# Patient Record
Sex: FEMALE | Race: WHITE | NOT HISPANIC OR LATINO | Employment: OTHER | URBAN - METROPOLITAN AREA
[De-identification: names, ages, dates, MRNs, and addresses within clinical notes are randomized per-mention and may not be internally consistent; named-entity substitution may affect disease eponyms.]

---

## 2023-04-14 ENCOUNTER — HOSPITAL ENCOUNTER (INPATIENT)
Facility: MEDICAL CENTER | Age: 73
LOS: 3 days | DRG: 956 | End: 2023-04-17
Attending: EMERGENCY MEDICINE | Admitting: STUDENT IN AN ORGANIZED HEALTH CARE EDUCATION/TRAINING PROGRAM
Payer: MEDICARE

## 2023-04-14 ENCOUNTER — APPOINTMENT (OUTPATIENT)
Dept: RADIOLOGY | Facility: MEDICAL CENTER | Age: 73
DRG: 956 | End: 2023-04-14
Attending: EMERGENCY MEDICINE
Payer: MEDICARE

## 2023-04-14 DIAGNOSIS — E86.0 DEHYDRATION: ICD-10-CM

## 2023-04-14 DIAGNOSIS — S72.002A CLOSED FRACTURE OF LEFT HIP, INITIAL ENCOUNTER (HCC): Primary | ICD-10-CM

## 2023-04-14 DIAGNOSIS — S72.002A HIP FRACTURE REQUIRING OPERATIVE REPAIR, LEFT, CLOSED, INITIAL ENCOUNTER (HCC): ICD-10-CM

## 2023-04-14 DIAGNOSIS — R00.0 SINUS TACHYCARDIA: ICD-10-CM
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
## 2023-04-14 PROBLEM — T79.6XXA TRAUMATIC RHABDOMYOLYSIS (HCC): Status: ACTIVE | Noted: 2023-04-14

## 2023-04-14 PROBLEM — E16.2 HYPOGLYCEMIA: Status: RESOLVED | Noted: 2023-04-14 | Resolved: 2023-04-14
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
## 2023-04-14 PROBLEM — D72.829 LEUKOCYTOSIS: Status: ACTIVE | Noted: 2023-04-14

## 2023-04-14 PROBLEM — F10.20 ALCOHOL DEPENDENCE (HCC): Status: ACTIVE | Noted: 2023-04-14

## 2023-04-14 PROBLEM — I16.0 HYPERTENSIVE URGENCY: Status: ACTIVE | Noted: 2023-04-14

## 2023-04-14 PROBLEM — Z71.89 ACP (ADVANCE CARE PLANNING): Status: ACTIVE | Noted: 2023-04-14

## 2023-04-14 PROBLEM — R73.9 HYPERGLYCEMIA: Status: ACTIVE | Noted: 2023-04-14

## 2023-04-14 PROBLEM — E16.2 HYPOGLYCEMIA: Status: ACTIVE | Noted: 2023-04-14

## 2023-04-14 LAB
ALBUMIN SERPL BCP-MCNC: 4.4 G/DL (ref 3.2–4.9)
ALBUMIN/GLOB SERPL: 1.3 G/DL
ALP SERPL-CCNC: 65 U/L (ref 30–99)
ALT SERPL-CCNC: 25 U/L (ref 2–50)
ANION GAP SERPL CALC-SCNC: 16 MMOL/L (ref 7–16)
APPEARANCE UR: CLEAR
AST SERPL-CCNC: 45 U/L (ref 12–45)
BASOPHILS # BLD AUTO: 0.2 % (ref 0–1.8)
BASOPHILS # BLD: 0.04 K/UL (ref 0–0.12)
BILIRUB SERPL-MCNC: 0.8 MG/DL (ref 0.1–1.5)
BILIRUB UR QL STRIP.AUTO: NEGATIVE
BUN SERPL-MCNC: 25 MG/DL (ref 8–22)
CALCIUM ALBUM COR SERPL-MCNC: 9.2 MG/DL (ref 8.5–10.5)
CALCIUM SERPL-MCNC: 9.5 MG/DL (ref 8.5–10.5)
CHLORIDE SERPL-SCNC: 100 MMOL/L (ref 96–112)
CK SERPL-CCNC: 1049 U/L (ref 0–154)
CO2 SERPL-SCNC: 21 MMOL/L (ref 20–33)
COLOR UR: YELLOW
CREAT SERPL-MCNC: 0.64 MG/DL (ref 0.5–1.4)
EKG IMPRESSION: NORMAL
EOSINOPHIL # BLD AUTO: 0.02 K/UL (ref 0–0.51)
EOSINOPHIL NFR BLD: 0.1 % (ref 0–6.9)
ERYTHROCYTE [DISTWIDTH] IN BLOOD BY AUTOMATED COUNT: 42.4 FL (ref 35.9–50)
ETHANOL BLD-MCNC: <10.1 MG/DL
GFR SERPLBLD CREATININE-BSD FMLA CKD-EPI: 94 ML/MIN/1.73 M 2
GLOBULIN SER CALC-MCNC: 3.5 G/DL (ref 1.9–3.5)
GLUCOSE SERPL-MCNC: 157 MG/DL (ref 65–99)
GLUCOSE UR STRIP.AUTO-MCNC: NEGATIVE MG/DL
HCT VFR BLD AUTO: 34.9 % (ref 37–47)
HGB BLD-MCNC: 11.7 G/DL (ref 12–16)
IMM GRANULOCYTES # BLD AUTO: 0.11 K/UL (ref 0–0.11)
IMM GRANULOCYTES NFR BLD AUTO: 0.6 % (ref 0–0.9)
KETONES UR STRIP.AUTO-MCNC: 40 MG/DL
LACTATE SERPL-SCNC: 1.7 MMOL/L (ref 0.5–2)
LEUKOCYTE ESTERASE UR QL STRIP.AUTO: NEGATIVE
LYMPHOCYTES # BLD AUTO: 1.07 K/UL (ref 1–4.8)
LYMPHOCYTES NFR BLD: 5.5 % (ref 22–41)
MCH RBC QN AUTO: 30.7 PG (ref 27–33)
MCHC RBC AUTO-ENTMCNC: 33.5 G/DL (ref 33.6–35)
MCV RBC AUTO: 91.6 FL (ref 81.4–97.8)
MICRO URNS: ABNORMAL
MONOCYTES # BLD AUTO: 1.71 K/UL (ref 0–0.85)
MONOCYTES NFR BLD AUTO: 8.8 % (ref 0–13.4)
NEUTROPHILS # BLD AUTO: 16.54 K/UL (ref 2–7.15)
NEUTROPHILS NFR BLD: 84.8 % (ref 44–72)
NITRITE UR QL STRIP.AUTO: NEGATIVE
NRBC # BLD AUTO: 0 K/UL
NRBC BLD-RTO: 0 /100 WBC
NT-PROBNP SERPL IA-MCNC: 224 PG/ML (ref 0–125)
PH UR STRIP.AUTO: 5 [PH] (ref 5–8)
PLATELET # BLD AUTO: 312 K/UL (ref 164–446)
PMV BLD AUTO: 10 FL (ref 9–12.9)
POTASSIUM SERPL-SCNC: 4 MMOL/L (ref 3.6–5.5)
PROCALCITONIN SERPL-MCNC: 0.2 NG/ML
PROT SERPL-MCNC: 7.9 G/DL (ref 6–8.2)
PROT UR QL STRIP: NEGATIVE MG/DL
RBC # BLD AUTO: 3.81 M/UL (ref 4.2–5.4)
RBC UR QL AUTO: NEGATIVE
SODIUM SERPL-SCNC: 137 MMOL/L (ref 135–145)
SP GR UR STRIP.AUTO: 1.02
TROPONIN T SERPL-MCNC: 12 NG/L (ref 6–19)
UROBILINOGEN UR STRIP.AUTO-MCNC: 0.2 MG/DL
WBC # BLD AUTO: 19.5 K/UL (ref 4.8–10.8)

## 2023-04-14 PROCEDURE — 99497 ADVNCD CARE PLAN 30 MIN: CPT | Performed by: STUDENT IN AN ORGANIZED HEALTH CARE EDUCATION/TRAINING PROGRAM

## 2023-04-14 PROCEDURE — 71045 X-RAY EXAM CHEST 1 VIEW: CPT

## 2023-04-14 PROCEDURE — 83880 ASSAY OF NATRIURETIC PEPTIDE: CPT

## 2023-04-14 PROCEDURE — 770001 HCHG ROOM/CARE - MED/SURG/GYN PRIV*

## 2023-04-14 PROCEDURE — 84145 PROCALCITONIN (PCT): CPT

## 2023-04-14 PROCEDURE — 93005 ELECTROCARDIOGRAM TRACING: CPT | Performed by: STUDENT IN AN ORGANIZED HEALTH CARE EDUCATION/TRAINING PROGRAM

## 2023-04-14 PROCEDURE — 84484 ASSAY OF TROPONIN QUANT: CPT

## 2023-04-14 PROCEDURE — 99223 1ST HOSP IP/OBS HIGH 75: CPT | Mod: AI,25 | Performed by: STUDENT IN AN ORGANIZED HEALTH CARE EDUCATION/TRAINING PROGRAM

## 2023-04-14 PROCEDURE — 700102 HCHG RX REV CODE 250 W/ 637 OVERRIDE(OP): Performed by: STUDENT IN AN ORGANIZED HEALTH CARE EDUCATION/TRAINING PROGRAM

## 2023-04-14 PROCEDURE — 82077 ASSAY SPEC XCP UR&BREATH IA: CPT

## 2023-04-14 PROCEDURE — 86901 BLOOD TYPING SEROLOGIC RH(D): CPT

## 2023-04-14 PROCEDURE — 82550 ASSAY OF CK (CPK): CPT

## 2023-04-14 PROCEDURE — 36415 COLL VENOUS BLD VENIPUNCTURE: CPT

## 2023-04-14 PROCEDURE — 700101 HCHG RX REV CODE 250: Performed by: EMERGENCY MEDICINE

## 2023-04-14 PROCEDURE — 700101 HCHG RX REV CODE 250: Performed by: STUDENT IN AN ORGANIZED HEALTH CARE EDUCATION/TRAINING PROGRAM

## 2023-04-14 PROCEDURE — 73552 X-RAY EXAM OF FEMUR 2/>: CPT | Mod: LT

## 2023-04-14 PROCEDURE — A9270 NON-COVERED ITEM OR SERVICE: HCPCS | Performed by: STUDENT IN AN ORGANIZED HEALTH CARE EDUCATION/TRAINING PROGRAM

## 2023-04-14 PROCEDURE — 86900 BLOOD TYPING SEROLOGIC ABO: CPT

## 2023-04-14 PROCEDURE — 87040 BLOOD CULTURE FOR BACTERIA: CPT

## 2023-04-14 PROCEDURE — 85025 COMPLETE CBC W/AUTO DIFF WBC: CPT

## 2023-04-14 PROCEDURE — 87086 URINE CULTURE/COLONY COUNT: CPT

## 2023-04-14 PROCEDURE — 700105 HCHG RX REV CODE 258: Performed by: STUDENT IN AN ORGANIZED HEALTH CARE EDUCATION/TRAINING PROGRAM

## 2023-04-14 PROCEDURE — 93005 ELECTROCARDIOGRAM TRACING: CPT | Performed by: EMERGENCY MEDICINE

## 2023-04-14 PROCEDURE — 64450 NJX AA&/STRD OTHER PN/BRANCH: CPT

## 2023-04-14 PROCEDURE — 72170 X-RAY EXAM OF PELVIS: CPT

## 2023-04-14 PROCEDURE — 80053 COMPREHEN METABOLIC PANEL: CPT

## 2023-04-14 PROCEDURE — 86850 RBC ANTIBODY SCREEN: CPT

## 2023-04-14 PROCEDURE — 99285 EMERGENCY DEPT VISIT HI MDM: CPT

## 2023-04-14 PROCEDURE — 3E0T3BZ INTRODUCTION OF ANESTHETIC AGENT INTO PERIPHERAL NERVES AND PLEXI, PERCUTANEOUS APPROACH: ICD-10-PCS | Performed by: EMERGENCY MEDICINE

## 2023-04-14 PROCEDURE — 700105 HCHG RX REV CODE 258: Performed by: EMERGENCY MEDICINE

## 2023-04-14 PROCEDURE — 81003 URINALYSIS AUTO W/O SCOPE: CPT

## 2023-04-14 PROCEDURE — 83605 ASSAY OF LACTIC ACID: CPT

## 2023-04-14 RX ORDER — LORAZEPAM 2 MG/ML
1.5 INJECTION INTRAMUSCULAR
Status: DISCONTINUED | OUTPATIENT
Start: 2023-04-14 | End: 2023-04-17

## 2023-04-14 RX ORDER — LORAZEPAM 1 MG/1
1 TABLET ORAL EVERY 4 HOURS PRN
Status: DISCONTINUED | OUTPATIENT
Start: 2023-04-14 | End: 2023-04-17

## 2023-04-14 RX ORDER — BUPIVACAINE HYDROCHLORIDE AND EPINEPHRINE 5; 5 MG/ML; UG/ML
30 INJECTION, SOLUTION EPIDURAL; INTRACAUDAL; PERINEURAL ONCE
Status: COMPLETED | OUTPATIENT
Start: 2023-04-14 | End: 2023-04-14

## 2023-04-14 RX ORDER — BISACODYL 10 MG
10 SUPPOSITORY, RECTAL RECTAL
Status: DISCONTINUED | OUTPATIENT
Start: 2023-04-14 | End: 2023-04-17 | Stop reason: HOSPADM

## 2023-04-14 RX ORDER — ACETAMINOPHEN 325 MG/1
650 TABLET ORAL EVERY 6 HOURS PRN
Status: DISCONTINUED | OUTPATIENT
Start: 2023-04-14 | End: 2023-04-14

## 2023-04-14 RX ORDER — LORAZEPAM 2 MG/ML
2 INJECTION INTRAMUSCULAR
Status: DISCONTINUED | OUTPATIENT
Start: 2023-04-14 | End: 2023-04-17

## 2023-04-14 RX ORDER — LORAZEPAM 2 MG/1
4 TABLET ORAL
Status: DISCONTINUED | OUTPATIENT
Start: 2023-04-14 | End: 2023-04-17

## 2023-04-14 RX ORDER — LORAZEPAM 2 MG/ML
0.5 INJECTION INTRAMUSCULAR EVERY 4 HOURS PRN
Status: DISCONTINUED | OUTPATIENT
Start: 2023-04-14 | End: 2023-04-17

## 2023-04-14 RX ORDER — ACETAMINOPHEN 325 MG/1
650 TABLET ORAL EVERY 6 HOURS PRN
Status: DISCONTINUED | OUTPATIENT
Start: 2023-04-20 | End: 2023-04-17 | Stop reason: HOSPADM

## 2023-04-14 RX ORDER — SODIUM CHLORIDE 9 MG/ML
2000 INJECTION, SOLUTION INTRAVENOUS CONTINUOUS
Status: DISCONTINUED | OUTPATIENT
Start: 2023-04-14 | End: 2023-04-17

## 2023-04-14 RX ORDER — LORAZEPAM 2 MG/1
2 TABLET ORAL
Status: DISCONTINUED | OUTPATIENT
Start: 2023-04-14 | End: 2023-04-17

## 2023-04-14 RX ORDER — OXYCODONE HYDROCHLORIDE 5 MG/1
5 TABLET ORAL
Status: DISCONTINUED | OUTPATIENT
Start: 2023-04-14 | End: 2023-04-17 | Stop reason: HOSPADM

## 2023-04-14 RX ORDER — GAUZE BANDAGE 2" X 2"
100 BANDAGE TOPICAL DAILY
Status: DISCONTINUED | OUTPATIENT
Start: 2023-04-15 | End: 2023-04-17 | Stop reason: HOSPADM

## 2023-04-14 RX ORDER — ACETAMINOPHEN 325 MG/1
650 TABLET ORAL EVERY 6 HOURS
Status: DISCONTINUED | OUTPATIENT
Start: 2023-04-15 | End: 2023-04-17 | Stop reason: HOSPADM

## 2023-04-14 RX ORDER — GUAIFENESIN/DEXTROMETHORPHAN 100-10MG/5
10 SYRUP ORAL EVERY 6 HOURS PRN
Status: DISCONTINUED | OUTPATIENT
Start: 2023-04-14 | End: 2023-04-17 | Stop reason: HOSPADM

## 2023-04-14 RX ORDER — CELECOXIB 200 MG/1
200 CAPSULE ORAL 2 TIMES DAILY PRN
Status: DISCONTINUED | OUTPATIENT
Start: 2023-04-19 | End: 2023-04-17 | Stop reason: HOSPADM

## 2023-04-14 RX ORDER — LIDOCAINE 50 MG/G
1 PATCH TOPICAL EVERY 24 HOURS
Status: DISCONTINUED | OUTPATIENT
Start: 2023-04-14 | End: 2023-04-17 | Stop reason: HOSPADM

## 2023-04-14 RX ORDER — HYDROMORPHONE HYDROCHLORIDE 1 MG/ML
0.5 INJECTION, SOLUTION INTRAMUSCULAR; INTRAVENOUS; SUBCUTANEOUS
Status: DISCONTINUED | OUTPATIENT
Start: 2023-04-14 | End: 2023-04-17 | Stop reason: HOSPADM

## 2023-04-14 RX ORDER — LABETALOL HYDROCHLORIDE 5 MG/ML
10 INJECTION, SOLUTION INTRAVENOUS EVERY 4 HOURS PRN
Status: DISCONTINUED | OUTPATIENT
Start: 2023-04-14 | End: 2023-04-17 | Stop reason: HOSPADM

## 2023-04-14 RX ORDER — SODIUM CHLORIDE 9 MG/ML
1000 INJECTION, SOLUTION INTRAVENOUS ONCE
Status: COMPLETED | OUTPATIENT
Start: 2023-04-14 | End: 2023-04-14

## 2023-04-14 RX ORDER — AMOXICILLIN 250 MG
2 CAPSULE ORAL 2 TIMES DAILY
Status: DISCONTINUED | OUTPATIENT
Start: 2023-04-14 | End: 2023-04-17 | Stop reason: HOSPADM

## 2023-04-14 RX ORDER — LORAZEPAM 2 MG/ML
1 INJECTION INTRAMUSCULAR
Status: DISCONTINUED | OUTPATIENT
Start: 2023-04-14 | End: 2023-04-17

## 2023-04-14 RX ORDER — LORAZEPAM 0.5 MG/1
0.5 TABLET ORAL EVERY 4 HOURS PRN
Status: DISCONTINUED | OUTPATIENT
Start: 2023-04-14 | End: 2023-04-17 | Stop reason: HOSPADM

## 2023-04-14 RX ORDER — ONDANSETRON 4 MG/1
4 TABLET, ORALLY DISINTEGRATING ORAL EVERY 4 HOURS PRN
Status: DISCONTINUED | OUTPATIENT
Start: 2023-04-14 | End: 2023-04-17 | Stop reason: HOSPADM

## 2023-04-14 RX ORDER — ENOXAPARIN SODIUM 100 MG/ML
40 INJECTION SUBCUTANEOUS DAILY
Status: DISCONTINUED | OUTPATIENT
Start: 2023-04-15 | End: 2023-04-17 | Stop reason: HOSPADM

## 2023-04-14 RX ORDER — FOLIC ACID 1 MG/1
1 TABLET ORAL DAILY
Status: DISCONTINUED | OUTPATIENT
Start: 2023-04-15 | End: 2023-04-17 | Stop reason: HOSPADM

## 2023-04-14 RX ORDER — CELECOXIB 200 MG/1
200 CAPSULE ORAL 2 TIMES DAILY
Status: DISCONTINUED | OUTPATIENT
Start: 2023-04-14 | End: 2023-04-17 | Stop reason: HOSPADM

## 2023-04-14 RX ORDER — ONDANSETRON 2 MG/ML
4 INJECTION INTRAMUSCULAR; INTRAVENOUS EVERY 4 HOURS PRN
Status: DISCONTINUED | OUTPATIENT
Start: 2023-04-14 | End: 2023-04-17 | Stop reason: HOSPADM

## 2023-04-14 RX ORDER — OXYCODONE HYDROCHLORIDE 10 MG/1
10 TABLET ORAL
Status: DISCONTINUED | OUTPATIENT
Start: 2023-04-14 | End: 2023-04-17 | Stop reason: HOSPADM

## 2023-04-14 RX ORDER — POLYETHYLENE GLYCOL 3350 17 G/17G
1 POWDER, FOR SOLUTION ORAL
Status: DISCONTINUED | OUTPATIENT
Start: 2023-04-14 | End: 2023-04-17 | Stop reason: HOSPADM

## 2023-04-14 RX ORDER — SODIUM CHLORIDE 9 MG/ML
INJECTION, SOLUTION INTRAVENOUS CONTINUOUS
Status: DISCONTINUED | OUTPATIENT
Start: 2023-04-14 | End: 2023-04-14

## 2023-04-14 RX ORDER — HYDRALAZINE HYDROCHLORIDE 20 MG/ML
10 INJECTION INTRAMUSCULAR; INTRAVENOUS EVERY 4 HOURS PRN
Status: DISCONTINUED | OUTPATIENT
Start: 2023-04-14 | End: 2023-04-17 | Stop reason: HOSPADM

## 2023-04-14 RX ORDER — SODIUM CHLORIDE, SODIUM LACTATE, POTASSIUM CHLORIDE, AND CALCIUM CHLORIDE .6; .31; .03; .02 G/100ML; G/100ML; G/100ML; G/100ML
500 INJECTION, SOLUTION INTRAVENOUS
Status: DISCONTINUED | OUTPATIENT
Start: 2023-04-14 | End: 2023-04-17 | Stop reason: HOSPADM

## 2023-04-14 RX ADMIN — SODIUM CHLORIDE 1000 ML: 9 INJECTION, SOLUTION INTRAVENOUS at 19:15

## 2023-04-14 RX ADMIN — SODIUM CHLORIDE 1000 ML: 9 INJECTION, SOLUTION INTRAVENOUS at 21:21

## 2023-04-14 RX ADMIN — LIDOCAINE 1 PATCH: 700 PATCH TOPICAL at 21:21

## 2023-04-14 RX ADMIN — BUPIVACAINE HYDROCHLORIDE AND EPINEPHRINE BITARTRATE 30 ML: 5; .005 INJECTION, SOLUTION EPIDURAL; INTRACAUDAL; PERINEURAL at 19:45

## 2023-04-14 RX ADMIN — CELECOXIB 200 MG: 200 CAPSULE ORAL at 21:34

## 2023-04-14 RX ADMIN — SODIUM CHLORIDE 2000 ML: 9 INJECTION, SOLUTION INTRAVENOUS at 22:57

## 2023-04-14 RX ADMIN — DOCUSATE SODIUM 50 MG AND SENNOSIDES 8.6 MG 2 TABLET: 8.6; 5 TABLET, FILM COATED ORAL at 21:34

## 2023-04-14 ASSESSMENT — ENCOUNTER SYMPTOMS
WEAKNESS: 1
DEPRESSION: 0
MYALGIAS: 1
HEARTBURN: 0
NAUSEA: 0
ABDOMINAL PAIN: 0
FALLS: 1
BRUISES/BLEEDS EASILY: 0
DIZZINESS: 0
CHILLS: 0
DOUBLE VISION: 0
FEVER: 0
VOMITING: 0
PALPITATIONS: 1
COUGH: 0
BLURRED VISION: 0
HEADACHES: 0
SHORTNESS OF BREATH: 0

## 2023-04-14 ASSESSMENT — LIFESTYLE VARIABLES
AVERAGE NUMBER OF DAYS PER WEEK YOU HAVE A DRINK CONTAINING ALCOHOL: 7
HOW MANY TIMES IN THE PAST YEAR HAVE YOU HAD 5 OR MORE DRINKS IN A DAY: 0
HAVE YOU EVER FELT YOU SHOULD CUT DOWN ON YOUR DRINKING: YES
PAROXYSMAL SWEATS: NO SWEAT VISIBLE
EVER FELT BAD OR GUILTY ABOUT YOUR DRINKING: NO
TOTAL SCORE: 0
ORIENTATION AND CLOUDING OF SENSORIUM: ORIENTED AND CAN DO SERIAL ADDITIONS
HAVE PEOPLE ANNOYED YOU BY CRITICIZING YOUR DRINKING: NO
DOES PATIENT WANT TO STOP DRINKING: YES
TOTAL SCORE: 1
NAUSEA AND VOMITING: NO NAUSEA AND NO VOMITING
VISUAL DISTURBANCES: NOT PRESENT
EVER HAD A DRINK FIRST THING IN THE MORNING TO STEADY YOUR NERVES TO GET RID OF A HANGOVER: NO
SUBSTANCE_ABUSE: 0
CONSUMPTION TOTAL: POSITIVE
AUDITORY DISTURBANCES: NOT PRESENT
ON A TYPICAL DAY WHEN YOU DRINK ALCOHOL HOW MANY DRINKS DO YOU HAVE: 2
ALCOHOL_USE: YES
DOES PATIENT WANT TO TALK TO SOMEONE ABOUT QUITTING: YES
TOTAL SCORE: 1
TREMOR: NO TREMOR
TOTAL SCORE: 1
ANXIETY: NO ANXIETY (AT EASE)
HEADACHE, FULLNESS IN HEAD: NOT PRESENT
AGITATION: NORMAL ACTIVITY

## 2023-04-14 ASSESSMENT — COGNITIVE AND FUNCTIONAL STATUS - GENERAL
DAILY ACTIVITIY SCORE: 17
TURNING FROM BACK TO SIDE WHILE IN FLAT BAD: A LOT
CLIMB 3 TO 5 STEPS WITH RAILING: TOTAL
HELP NEEDED FOR BATHING: A LOT
MOVING TO AND FROM BED TO CHAIR: A LOT
STANDING UP FROM CHAIR USING ARMS: A LOT
DRESSING REGULAR UPPER BODY CLOTHING: A LITTLE
SUGGESTED CMS G CODE MODIFIER MOBILITY: CL
SUGGESTED CMS G CODE MODIFIER DAILY ACTIVITY: CK
WALKING IN HOSPITAL ROOM: TOTAL
MOBILITY SCORE: 10
DRESSING REGULAR LOWER BODY CLOTHING: A LOT
TOILETING: A LOT
MOVING FROM LYING ON BACK TO SITTING ON SIDE OF FLAT BED: A LOT

## 2023-04-14 ASSESSMENT — PAIN DESCRIPTION - PAIN TYPE: TYPE: ACUTE PAIN

## 2023-04-14 ASSESSMENT — PATIENT HEALTH QUESTIONNAIRE - PHQ9
SUM OF ALL RESPONSES TO PHQ9 QUESTIONS 1 AND 2: 0
1. LITTLE INTEREST OR PLEASURE IN DOING THINGS: NOT AT ALL
2. FEELING DOWN, DEPRESSED, IRRITABLE, OR HOPELESS: NOT AT ALL

## 2023-04-15 ENCOUNTER — ANESTHESIA (OUTPATIENT)
Dept: SURGERY | Facility: MEDICAL CENTER | Age: 73
DRG: 956 | End: 2023-04-15
Payer: MEDICARE

## 2023-04-15 ENCOUNTER — APPOINTMENT (OUTPATIENT)
Dept: RADIOLOGY | Facility: MEDICAL CENTER | Age: 73
DRG: 956 | End: 2023-04-15
Attending: ORTHOPAEDIC SURGERY
Payer: MEDICARE

## 2023-04-15 ENCOUNTER — ANESTHESIA EVENT (OUTPATIENT)
Dept: SURGERY | Facility: MEDICAL CENTER | Age: 73
DRG: 956 | End: 2023-04-15
Payer: MEDICARE

## 2023-04-15 LAB
ABO + RH BLD: NORMAL
ABO GROUP BLD: NORMAL
ALBUMIN SERPL BCP-MCNC: 3.4 G/DL (ref 3.2–4.9)
ALBUMIN/GLOB SERPL: 1.2 G/DL
ALP SERPL-CCNC: 53 U/L (ref 30–99)
ALT SERPL-CCNC: 22 U/L (ref 2–50)
ANION GAP SERPL CALC-SCNC: 12 MMOL/L (ref 7–16)
AST SERPL-CCNC: 40 U/L (ref 12–45)
BASOPHILS # BLD AUTO: 0.4 % (ref 0–1.8)
BASOPHILS # BLD: 0.05 K/UL (ref 0–0.12)
BILIRUB SERPL-MCNC: 0.7 MG/DL (ref 0.1–1.5)
BLD GP AB SCN SERPL QL: NORMAL
BUN SERPL-MCNC: 24 MG/DL (ref 8–22)
CALCIUM ALBUM COR SERPL-MCNC: 8.8 MG/DL (ref 8.5–10.5)
CALCIUM SERPL-MCNC: 8.3 MG/DL (ref 8.5–10.5)
CHLORIDE SERPL-SCNC: 105 MMOL/L (ref 96–112)
CO2 SERPL-SCNC: 20 MMOL/L (ref 20–33)
CREAT SERPL-MCNC: 0.59 MG/DL (ref 0.5–1.4)
EOSINOPHIL # BLD AUTO: 0 K/UL (ref 0–0.51)
EOSINOPHIL NFR BLD: 0 % (ref 0–6.9)
ERYTHROCYTE [DISTWIDTH] IN BLOOD BY AUTOMATED COUNT: 42.5 FL (ref 35.9–50)
GFR SERPLBLD CREATININE-BSD FMLA CKD-EPI: 96 ML/MIN/1.73 M 2
GLOBULIN SER CALC-MCNC: 2.8 G/DL (ref 1.9–3.5)
GLUCOSE SERPL-MCNC: 99 MG/DL (ref 65–99)
HCT VFR BLD AUTO: 27.8 % (ref 37–47)
HGB BLD-MCNC: 9.4 G/DL (ref 12–16)
IMM GRANULOCYTES # BLD AUTO: 0.06 K/UL (ref 0–0.11)
IMM GRANULOCYTES NFR BLD AUTO: 0.5 % (ref 0–0.9)
LYMPHOCYTES # BLD AUTO: 1.95 K/UL (ref 1–4.8)
LYMPHOCYTES NFR BLD: 15.3 % (ref 22–41)
MAGNESIUM SERPL-MCNC: 1.7 MG/DL (ref 1.5–2.5)
MCH RBC QN AUTO: 30.6 PG (ref 27–33)
MCHC RBC AUTO-ENTMCNC: 33.8 G/DL (ref 33.6–35)
MCV RBC AUTO: 90.6 FL (ref 81.4–97.8)
MONOCYTES # BLD AUTO: 1.46 K/UL (ref 0–0.85)
MONOCYTES NFR BLD AUTO: 11.4 % (ref 0–13.4)
NEUTROPHILS # BLD AUTO: 9.26 K/UL (ref 2–7.15)
NEUTROPHILS NFR BLD: 72.4 % (ref 44–72)
NRBC # BLD AUTO: 0 K/UL
NRBC BLD-RTO: 0 /100 WBC
PHOSPHATE SERPL-MCNC: 2.7 MG/DL (ref 2.5–4.5)
PLATELET # BLD AUTO: 208 K/UL (ref 164–446)
PMV BLD AUTO: 10.1 FL (ref 9–12.9)
POTASSIUM SERPL-SCNC: 3.7 MMOL/L (ref 3.6–5.5)
PROT SERPL-MCNC: 6.2 G/DL (ref 6–8.2)
RBC # BLD AUTO: 3.07 M/UL (ref 4.2–5.4)
RH BLD: NORMAL
SODIUM SERPL-SCNC: 137 MMOL/L (ref 135–145)
WBC # BLD AUTO: 12.8 K/UL (ref 4.8–10.8)

## 2023-04-15 PROCEDURE — 01230 ANES OPN UPPER 2/3 FEMUR NOS: CPT | Performed by: ANESTHESIOLOGY

## 2023-04-15 PROCEDURE — 99233 SBSQ HOSP IP/OBS HIGH 50: CPT | Performed by: HOSPITALIST

## 2023-04-15 PROCEDURE — 700105 HCHG RX REV CODE 258: Performed by: ANESTHESIOLOGY

## 2023-04-15 PROCEDURE — 700102 HCHG RX REV CODE 250 W/ 637 OVERRIDE(OP): Performed by: STUDENT IN AN ORGANIZED HEALTH CARE EDUCATION/TRAINING PROGRAM

## 2023-04-15 PROCEDURE — 84100 ASSAY OF PHOSPHORUS: CPT

## 2023-04-15 PROCEDURE — 770001 HCHG ROOM/CARE - MED/SURG/GYN PRIV*

## 2023-04-15 PROCEDURE — 80053 COMPREHEN METABOLIC PANEL: CPT

## 2023-04-15 PROCEDURE — 502000 HCHG MISC OR IMPLANTS RC 0278: Performed by: ORTHOPAEDIC SURGERY

## 2023-04-15 PROCEDURE — A9270 NON-COVERED ITEM OR SERVICE: HCPCS | Performed by: STUDENT IN AN ORGANIZED HEALTH CARE EDUCATION/TRAINING PROGRAM

## 2023-04-15 PROCEDURE — 83735 ASSAY OF MAGNESIUM: CPT

## 2023-04-15 PROCEDURE — C1713 ANCHOR/SCREW BN/BN,TIS/BN: HCPCS | Performed by: ORTHOPAEDIC SURGERY

## 2023-04-15 PROCEDURE — 700111 HCHG RX REV CODE 636 W/ 250 OVERRIDE (IP): Performed by: ANESTHESIOLOGY

## 2023-04-15 PROCEDURE — 73552 X-RAY EXAM OF FEMUR 2/>: CPT | Mod: LT

## 2023-04-15 PROCEDURE — 160029 HCHG SURGERY MINUTES - 1ST 30 MINS LEVEL 4: Performed by: ORTHOPAEDIC SURGERY

## 2023-04-15 PROCEDURE — 160009 HCHG ANES TIME/MIN: Performed by: ORTHOPAEDIC SURGERY

## 2023-04-15 PROCEDURE — 0QH736Z INSERTION OF INTRAMEDULLARY INTERNAL FIXATION DEVICE INTO LEFT UPPER FEMUR, PERCUTANEOUS APPROACH: ICD-10-PCS | Performed by: ORTHOPAEDIC SURGERY

## 2023-04-15 PROCEDURE — 36415 COLL VENOUS BLD VENIPUNCTURE: CPT

## 2023-04-15 PROCEDURE — 160048 HCHG OR STATISTICAL LEVEL 1-5: Performed by: ORTHOPAEDIC SURGERY

## 2023-04-15 PROCEDURE — 160002 HCHG RECOVERY MINUTES (STAT): Performed by: ORTHOPAEDIC SURGERY

## 2023-04-15 PROCEDURE — 700105 HCHG RX REV CODE 258: Performed by: ORTHOPAEDIC SURGERY

## 2023-04-15 PROCEDURE — 99100 ANES PT EXTEME AGE<1 YR&>70: CPT | Performed by: ANESTHESIOLOGY

## 2023-04-15 PROCEDURE — 85025 COMPLETE CBC W/AUTO DIFF WBC: CPT

## 2023-04-15 PROCEDURE — A9270 NON-COVERED ITEM OR SERVICE: HCPCS | Performed by: ANESTHESIOLOGY

## 2023-04-15 PROCEDURE — 700101 HCHG RX REV CODE 250: Performed by: ANESTHESIOLOGY

## 2023-04-15 PROCEDURE — 160041 HCHG SURGERY MINUTES - EA ADDL 1 MIN LEVEL 4: Performed by: ORTHOPAEDIC SURGERY

## 2023-04-15 PROCEDURE — 700102 HCHG RX REV CODE 250 W/ 637 OVERRIDE(OP): Performed by: ANESTHESIOLOGY

## 2023-04-15 PROCEDURE — 160035 HCHG PACU - 1ST 60 MINS PHASE I: Performed by: ORTHOPAEDIC SURGERY

## 2023-04-15 PROCEDURE — 700111 HCHG RX REV CODE 636 W/ 250 OVERRIDE (IP): Performed by: STUDENT IN AN ORGANIZED HEALTH CARE EDUCATION/TRAINING PROGRAM

## 2023-04-15 PROCEDURE — 700111 HCHG RX REV CODE 636 W/ 250 OVERRIDE (IP): Performed by: ORTHOPAEDIC SURGERY

## 2023-04-15 PROCEDURE — 110371 HCHG SHELL REV 272: Performed by: ORTHOPAEDIC SURGERY

## 2023-04-15 DEVICE — IMPLANTABLE DEVICE: Type: IMPLANTABLE DEVICE | Site: FEMUR | Status: FUNCTIONAL

## 2023-04-15 DEVICE — ADVANCED LOCKING SCREW 5X45MM: Type: IMPLANTABLE DEVICE | Site: FEMUR | Status: FUNCTIONAL

## 2023-04-15 RX ORDER — HYDRALAZINE HYDROCHLORIDE 20 MG/ML
5 INJECTION INTRAMUSCULAR; INTRAVENOUS
Status: DISCONTINUED | OUTPATIENT
Start: 2023-04-15 | End: 2023-04-15 | Stop reason: HOSPADM

## 2023-04-15 RX ORDER — SODIUM CHLORIDE, SODIUM LACTATE, POTASSIUM CHLORIDE, CALCIUM CHLORIDE 600; 310; 30; 20 MG/100ML; MG/100ML; MG/100ML; MG/100ML
INJECTION, SOLUTION INTRAVENOUS CONTINUOUS
Status: DISCONTINUED | OUTPATIENT
Start: 2023-04-15 | End: 2023-04-15 | Stop reason: HOSPADM

## 2023-04-15 RX ORDER — ONDANSETRON 2 MG/ML
INJECTION INTRAMUSCULAR; INTRAVENOUS PRN
Status: DISCONTINUED | OUTPATIENT
Start: 2023-04-15 | End: 2023-04-15 | Stop reason: SURG

## 2023-04-15 RX ORDER — MIDAZOLAM HYDROCHLORIDE 1 MG/ML
1 INJECTION INTRAMUSCULAR; INTRAVENOUS
Status: DISCONTINUED | OUTPATIENT
Start: 2023-04-15 | End: 2023-04-15 | Stop reason: HOSPADM

## 2023-04-15 RX ORDER — SODIUM CHLORIDE, SODIUM LACTATE, POTASSIUM CHLORIDE, CALCIUM CHLORIDE 600; 310; 30; 20 MG/100ML; MG/100ML; MG/100ML; MG/100ML
INJECTION, SOLUTION INTRAVENOUS
Status: DISCONTINUED | OUTPATIENT
Start: 2023-04-15 | End: 2023-04-15 | Stop reason: SURG

## 2023-04-15 RX ORDER — ALBUTEROL SULFATE 2.5 MG/3ML
2.5 SOLUTION RESPIRATORY (INHALATION)
Status: DISCONTINUED | OUTPATIENT
Start: 2023-04-15 | End: 2023-04-15 | Stop reason: HOSPADM

## 2023-04-15 RX ORDER — MIDAZOLAM HYDROCHLORIDE 1 MG/ML
INJECTION INTRAMUSCULAR; INTRAVENOUS PRN
Status: DISCONTINUED | OUTPATIENT
Start: 2023-04-15 | End: 2023-04-15 | Stop reason: SURG

## 2023-04-15 RX ORDER — EPHEDRINE SULFATE 50 MG/ML
5 INJECTION, SOLUTION INTRAVENOUS
Status: DISCONTINUED | OUTPATIENT
Start: 2023-04-15 | End: 2023-04-15 | Stop reason: HOSPADM

## 2023-04-15 RX ORDER — EPHEDRINE SULFATE 50 MG/ML
INJECTION, SOLUTION INTRAVENOUS PRN
Status: DISCONTINUED | OUTPATIENT
Start: 2023-04-15 | End: 2023-04-15 | Stop reason: SURG

## 2023-04-15 RX ORDER — ONDANSETRON 2 MG/ML
4 INJECTION INTRAMUSCULAR; INTRAVENOUS
Status: DISCONTINUED | OUTPATIENT
Start: 2023-04-15 | End: 2023-04-15 | Stop reason: HOSPADM

## 2023-04-15 RX ORDER — LIDOCAINE HYDROCHLORIDE 20 MG/ML
INJECTION, SOLUTION EPIDURAL; INFILTRATION; INTRACAUDAL; PERINEURAL PRN
Status: DISCONTINUED | OUTPATIENT
Start: 2023-04-15 | End: 2023-04-15 | Stop reason: SURG

## 2023-04-15 RX ORDER — HALOPERIDOL 5 MG/ML
1 INJECTION INTRAMUSCULAR
Status: DISCONTINUED | OUTPATIENT
Start: 2023-04-15 | End: 2023-04-15 | Stop reason: HOSPADM

## 2023-04-15 RX ORDER — ROCURONIUM BROMIDE 10 MG/ML
INJECTION, SOLUTION INTRAVENOUS PRN
Status: DISCONTINUED | OUTPATIENT
Start: 2023-04-15 | End: 2023-04-15 | Stop reason: SURG

## 2023-04-15 RX ORDER — LABETALOL HYDROCHLORIDE 5 MG/ML
INJECTION, SOLUTION INTRAVENOUS PRN
Status: DISCONTINUED | OUTPATIENT
Start: 2023-04-15 | End: 2023-04-15 | Stop reason: SURG

## 2023-04-15 RX ORDER — HYDROMORPHONE HYDROCHLORIDE 1 MG/ML
0.1 INJECTION, SOLUTION INTRAMUSCULAR; INTRAVENOUS; SUBCUTANEOUS
Status: DISCONTINUED | OUTPATIENT
Start: 2023-04-15 | End: 2023-04-15 | Stop reason: HOSPADM

## 2023-04-15 RX ORDER — OXYCODONE HCL 5 MG/5 ML
10 SOLUTION, ORAL ORAL
Status: COMPLETED | OUTPATIENT
Start: 2023-04-15 | End: 2023-04-15

## 2023-04-15 RX ORDER — CEFAZOLIN SODIUM 1 G/3ML
INJECTION, POWDER, FOR SOLUTION INTRAMUSCULAR; INTRAVENOUS PRN
Status: DISCONTINUED | OUTPATIENT
Start: 2023-04-15 | End: 2023-04-15 | Stop reason: SURG

## 2023-04-15 RX ORDER — DIPHENHYDRAMINE HYDROCHLORIDE 50 MG/ML
12.5 INJECTION INTRAMUSCULAR; INTRAVENOUS
Status: DISCONTINUED | OUTPATIENT
Start: 2023-04-15 | End: 2023-04-15 | Stop reason: HOSPADM

## 2023-04-15 RX ORDER — DEXAMETHASONE SODIUM PHOSPHATE 4 MG/ML
INJECTION, SOLUTION INTRA-ARTICULAR; INTRALESIONAL; INTRAMUSCULAR; INTRAVENOUS; SOFT TISSUE PRN
Status: DISCONTINUED | OUTPATIENT
Start: 2023-04-15 | End: 2023-04-15 | Stop reason: SURG

## 2023-04-15 RX ORDER — MAGNESIUM SULFATE HEPTAHYDRATE 40 MG/ML
INJECTION, SOLUTION INTRAVENOUS PRN
Status: DISCONTINUED | OUTPATIENT
Start: 2023-04-15 | End: 2023-04-15 | Stop reason: SURG

## 2023-04-15 RX ORDER — HYDROMORPHONE HYDROCHLORIDE 1 MG/ML
0.2 INJECTION, SOLUTION INTRAMUSCULAR; INTRAVENOUS; SUBCUTANEOUS
Status: DISCONTINUED | OUTPATIENT
Start: 2023-04-15 | End: 2023-04-15 | Stop reason: HOSPADM

## 2023-04-15 RX ORDER — OXYCODONE HCL 5 MG/5 ML
5 SOLUTION, ORAL ORAL
Status: COMPLETED | OUTPATIENT
Start: 2023-04-15 | End: 2023-04-15

## 2023-04-15 RX ORDER — HYDROMORPHONE HYDROCHLORIDE 1 MG/ML
0.4 INJECTION, SOLUTION INTRAMUSCULAR; INTRAVENOUS; SUBCUTANEOUS
Status: DISCONTINUED | OUTPATIENT
Start: 2023-04-15 | End: 2023-04-15 | Stop reason: HOSPADM

## 2023-04-15 RX ADMIN — CELECOXIB 200 MG: 200 CAPSULE ORAL at 16:41

## 2023-04-15 RX ADMIN — SODIUM CHLORIDE, POTASSIUM CHLORIDE, SODIUM LACTATE AND CALCIUM CHLORIDE: 600; 310; 30; 20 INJECTION, SOLUTION INTRAVENOUS at 08:07

## 2023-04-15 RX ADMIN — ONDANSETRON 4 MG: 2 INJECTION INTRAMUSCULAR; INTRAVENOUS at 08:56

## 2023-04-15 RX ADMIN — PROPOFOL 125 MG: 10 INJECTION, EMULSION INTRAVENOUS at 08:13

## 2023-04-15 RX ADMIN — CEFAZOLIN 2 G: 2 INJECTION, POWDER, FOR SOLUTION INTRAMUSCULAR; INTRAVENOUS at 16:49

## 2023-04-15 RX ADMIN — OXYCODONE 5 MG: 5 TABLET ORAL at 18:38

## 2023-04-15 RX ADMIN — Medication 100 MG: at 05:52

## 2023-04-15 RX ADMIN — OXYCODONE HYDROCHLORIDE 10 MG: 5 SOLUTION ORAL at 09:48

## 2023-04-15 RX ADMIN — FENTANYL CITRATE 50 MCG: 50 INJECTION, SOLUTION INTRAMUSCULAR; INTRAVENOUS at 08:10

## 2023-04-15 RX ADMIN — ACETAMINOPHEN 650 MG: 325 TABLET, FILM COATED ORAL at 12:18

## 2023-04-15 RX ADMIN — ROCURONIUM BROMIDE 35 MG: 10 INJECTION, SOLUTION INTRAVENOUS at 08:13

## 2023-04-15 RX ADMIN — PROPOFOL 25 MG: 10 INJECTION, EMULSION INTRAVENOUS at 08:17

## 2023-04-15 RX ADMIN — ACETAMINOPHEN 650 MG: 325 TABLET, FILM COATED ORAL at 16:41

## 2023-04-15 RX ADMIN — LIDOCAINE HYDROCHLORIDE 45 MG: 20 INJECTION, SOLUTION EPIDURAL; INFILTRATION; INTRACAUDAL at 08:13

## 2023-04-15 RX ADMIN — THERA TABS 1 TABLET: TAB at 05:52

## 2023-04-15 RX ADMIN — DOCUSATE SODIUM 50 MG AND SENNOSIDES 8.6 MG 2 TABLET: 8.6; 5 TABLET, FILM COATED ORAL at 16:41

## 2023-04-15 RX ADMIN — LABETALOL HYDROCHLORIDE 5 MG: 5 INJECTION, SOLUTION INTRAVENOUS at 09:24

## 2023-04-15 RX ADMIN — ACETAMINOPHEN 650 MG: 325 TABLET, FILM COATED ORAL at 05:51

## 2023-04-15 RX ADMIN — FENTANYL CITRATE 25 MCG: 50 INJECTION, SOLUTION INTRAMUSCULAR; INTRAVENOUS at 09:51

## 2023-04-15 RX ADMIN — FOLIC ACID 1 MG: 1 TABLET ORAL at 05:51

## 2023-04-15 RX ADMIN — LABETALOL HYDROCHLORIDE 5 MG: 5 INJECTION, SOLUTION INTRAVENOUS at 09:19

## 2023-04-15 RX ADMIN — MAGNESIUM SULFATE HEPTAHYDRATE 1 G: 40 INJECTION, SOLUTION INTRAVENOUS at 08:27

## 2023-04-15 RX ADMIN — ACETAMINOPHEN 650 MG: 325 TABLET, FILM COATED ORAL at 01:50

## 2023-04-15 RX ADMIN — FENTANYL CITRATE 50 MCG: 50 INJECTION, SOLUTION INTRAMUSCULAR; INTRAVENOUS at 08:36

## 2023-04-15 RX ADMIN — EPHEDRINE SULFATE 5 MG: 50 INJECTION, SOLUTION INTRAVENOUS at 08:57

## 2023-04-15 RX ADMIN — ENOXAPARIN SODIUM 40 MG: 40 INJECTION SUBCUTANEOUS at 18:39

## 2023-04-15 RX ADMIN — SUGAMMADEX 200 MG: 100 INJECTION, SOLUTION INTRAVENOUS at 09:19

## 2023-04-15 RX ADMIN — FENTANYL CITRATE 25 MCG: 50 INJECTION, SOLUTION INTRAMUSCULAR; INTRAVENOUS at 09:49

## 2023-04-15 RX ADMIN — CEFAZOLIN 2 G: 330 INJECTION, POWDER, FOR SOLUTION INTRAMUSCULAR; INTRAVENOUS at 08:21

## 2023-04-15 RX ADMIN — MIDAZOLAM HYDROCHLORIDE 1 MG: 1 INJECTION, SOLUTION INTRAMUSCULAR; INTRAVENOUS at 08:09

## 2023-04-15 RX ADMIN — DEXAMETHASONE SODIUM PHOSPHATE 4 MG: 4 INJECTION, SOLUTION INTRA-ARTICULAR; INTRALESIONAL; INTRAMUSCULAR; INTRAVENOUS; SOFT TISSUE at 08:18

## 2023-04-15 ASSESSMENT — ENCOUNTER SYMPTOMS
DOUBLE VISION: 0
MYALGIAS: 1
NERVOUS/ANXIOUS: 1
ORTHOPNEA: 0
FEVER: 0
BLURRED VISION: 0
HEARTBURN: 0
HEADACHES: 0
ABDOMINAL PAIN: 0
COUGH: 0
NAUSEA: 0
VOMITING: 0

## 2023-04-15 ASSESSMENT — LIFESTYLE VARIABLES
AUDITORY DISTURBANCES: NOT PRESENT
TREMOR: NO TREMOR
ORIENTATION AND CLOUDING OF SENSORIUM: ORIENTED AND CAN DO SERIAL ADDITIONS
VISUAL DISTURBANCES: NOT PRESENT
TOTAL SCORE: 2
HEADACHE, FULLNESS IN HEAD: MILD
PAROXYSMAL SWEATS: NO SWEAT VISIBLE
NAUSEA AND VOMITING: NO NAUSEA AND NO VOMITING
AGITATION: NORMAL ACTIVITY
ANXIETY: NO ANXIETY (AT EASE)

## 2023-04-15 ASSESSMENT — PAIN SCALES - GENERAL: PAIN_LEVEL: 2

## 2023-04-15 ASSESSMENT — PAIN DESCRIPTION - PAIN TYPE
TYPE: SURGICAL PAIN
TYPE: ACUTE PAIN
TYPE: SURGICAL PAIN

## 2023-04-15 NOTE — ASSESSMENT & PLAN NOTE
Does not take any meds at home  Likely secondary to uncontrolled pain  Supportive pain control  As needed IV hydralazine and IV labetalol

## 2023-04-15 NOTE — ED TRIAGE NOTES
"Chief Complaint   Patient presents with    GLF     Late last night approx 01:00am pt fell in bedroom after walking to the bathroom, pt was not able to get up and was eventually found by her friend at 16:00 today, downtime approx 15 hours, pt denies head strike or LOC, pt c/o L hip pain    Hip Pain     L hip s/p GLF last night, +CMS      Pt BIB care flight for above complaints, pt lives at private home by herself, VSS on RA, GCS 15, NAD.        BP (!) 166/64   Pulse (!) 108   Temp 36.9 °C (98.4 °F) (Temporal)   Resp 18   Ht 1.575 m (5' 2\")   Wt 52.2 kg (115 lb)   SpO2 97%   BMI 21.03 kg/m²     "

## 2023-04-15 NOTE — ASSESSMENT & PLAN NOTE
S/p GLF  Acute comminuted intertrochanteric fracture of left femur noted on x-ray   Orthopedic surgery was consulted, patient underwent Open treatment with intramedullary nailing, left intertrochanteric femur fracture. Patient can bear weight as tolerated.  PT/OT, DVT prophylaxis, multimodal pain management

## 2023-04-15 NOTE — ANESTHESIA PROCEDURE NOTES
Airway    Date/Time: 4/15/2023 8:13 AM  Performed by: Henry Coronel M.D.  Authorized by: Henry Coronel M.D.     Location:  OR  Urgency:  Elective  Indications for Airway Management:  Anesthesia      Spontaneous Ventilation: absent    Sedation Level:  Deep  Preoxygenated: Yes    Patient Position:  Sniffing  Final Airway Type:  Endotracheal airway  Final Endotracheal Airway:  ETT  Cuffed: Yes    Technique Used for Successful ETT Placement:  Direct laryngoscopy    Insertion Site:  Oral  Blade Type:  Leonardo  Laryngoscope Blade/Videolaryngoscope Blade Size:  3  ETT Size (mm):  7.0  Measured from:  Lips  ETT to Lips (cm):  21  Placement Verified by: auscultation and capnometry    Cormack-Lehane Classification:  Grade I - full view of glottis  Number of Attempts at Approach:  1

## 2023-04-15 NOTE — ASSESSMENT & PLAN NOTE
Discussed in ED. Patient is agreeable for CPR/defibrillation/intubation or mechanical ventilation.  She is also agreeable for anticipated surgery for repair hip, as well as other necessary invasive and noninvasive medical management. FULL code status confirmed.  ACP: 17mins

## 2023-04-15 NOTE — OP REPORT
DATE OF SERVICE:  04/15/2023     PREOPERATIVE DIAGNOSIS:  Left comminuted displaced intertrochanteric femur   fracture.     POSTOPERATIVE DIAGNOSIS:  Left comminuted displaced intertrochanteric femur   fracture.     PROCEDURE PERFORMED:  Open treatment with intramedullary nailing, left   intertrochanteric femur fracture.     SURGEON:  Elvin Isidro MD     ANESTHESIOLOGIST:  Henry Coronel MD     ANESTHESIA:  General.     ESTIMATED BLOOD LOSS:  150 mL     IMPLANTS:  Elizabeth Gamma4 cephalomedullary nail with measuring 10x380 mm, 125   degrees with a 90 mm lag screw and a single 5.0 mm advanced interlocking   screw.     INDICATIONS FOR PROCEDURE:  This patient is 73 years old.  She had a fall,   sustained a left comminuted displaced proximal femur fracture.  She was   admitted to the hospitalist service, felt to be medically optimized for   surgical management.  I discussed treatment options with her and recommended   surgical reduction and fixation with intramedullary nailing.  She signed   informed consent preoperatively and wished to proceed with surgery as outlined   above.     DESCRIPTION OF PROCEDURE:  The patient was met in the preoperative holding   area.  Her surgical site was signed.  Her consent was confirmed to be   accurate.  She was taken back to the operating room and general anesthesia was   induced.  She was positioned on the fracture table in supine position.    Traction, internal rotation was applied to the left lower extremity across the   perineal post.  Right lower extremity was extended without traction applied.    There was significant comminution and displacement of the proximal femur, but   traction did improve with the overall alignment and fine tuning the rotation   was able to achieve what I felt was acceptable alignment with traction alone.    The left thigh was provisionally cleansed with isopropyl alcohol and then   prepped and draped in the usual sterile fashion.  A formal timeout  was   performed to confirm patient's correct name, correct surgical site, correct   procedure and correct laterality.  Percutaneous starting point at the tip of   the greater trochanter was obtained with guide pin, which was then advanced   into the proximal femur.  I incised the skin over the guide pin, used the   entry reamer to enter the proximal femur.  I placed a bent on a ball-tipped   guide marina, inserted it down to the distal femur. I confirmed fluoroscopically   that the guide pin was well positioned distally.  The overall fracture   alignment was maintained.  I elected a long intramedullary nail just given the   fracture pattern, was quite atypical and relatively transverse with   significant comminution at minimize the risk of loss of fixation.  I inserted   the nail over the guide marina and inserted a guide pin for the cephalomedullary   screw and what I felt was in acceptable position to achieve an appropriate tip   to apex distance.  I then prepared for inserted a 90 mm lag screw and then   using the insertion instrumentation, medialized the nail.  I then placed one   lateral to medial interlocking screw distally using perfect [place] technique.    I removed all insertion instrumentation and final fluoroscopic imaging   confirmed overall acceptable alignment of the fracture and acceptable position   of the implants.  The wounds were thoroughly irrigated with normal saline.  I   repaired the deep fascial layers with 0 Vicryl, subcutaneous layers with 0   Vicryl and skin edges with staples.  Wounds were thoroughly cleansed, dried   and sterile dressings were applied.  She was then taken out of traction,   transferred on the rWoodson, woken up from anesthesia and taken to   postanesthesia care unit in stable condition.     PLAN:  1.  The patient will be readmitted postoperatively to the hospitalist service.  2.  She can weightbear as tolerated to left lower extremity.  3.  She will need Ancef 2 doses postop for  infection prophylaxis.  4.  She should work with physical and occupational therapy as soon as possible   for mobilization.  5.  She can be started on DVT chemoprophylaxis tomorrow if otherwise   clinically appropriate and should continue SCDs in the meantime.  6.  Ultimately, she will need to follow up with me 2 weeks postop for routine   wound check, staple removal and x-rays, two views of the left femur and AP   pelvis.        ______________________________  MD SAMEERA Duffy/YUDI    DD:  04/15/2023 09:25  DT:  04/15/2023 10:16    Job#:  [id number]

## 2023-04-15 NOTE — OR NURSING
0985 Received pt from OR on bed.   Pt resting VSS, voicing no complaints.  0950 Pt medicated  0955  Family updated  No belongings with pt  1020 report given

## 2023-04-15 NOTE — ANESTHESIA TIME REPORT
Anesthesia Start and Stop Event Times     Date Time Event    4/15/2023 0746 Ready for Procedure     0809 Anesthesia Start     0926 Anesthesia Stop        Responsible Staff  04/15/23    Name Role Begin End    Henry Coronel M.D. Anesth 0809 0926        Overtime Reason:  no overtime (within assigned shift)    Comments:

## 2023-04-15 NOTE — CONSULTS
DATE OF SERVICE:  04/14/2023     ORTHOPEDIC CONSULTATION     REQUESTING PHYSICIAN:  Raffaele Brown MD, emergency department.     REASON FOR CONSULTATION:  Left proximal femur fracture.     CHIEF COMPLAINT:  Left hip pain.     HISTORY OF PRESENT ILLNESS:  The patient is 73 years old.  She fell yesterday   morning after walking to the bathroom.  She was down on the ground for about   15 hours or so until her friend found her.  She presented to [facility], was found   to have a left displaced proximal femur fracture.  She denies pain in this   hip prior to her fall.  She denies other injuries and she denies numbness or   paresthesias to the left lower extremity.     PAST MEDICAL HISTORY:  ALLERGIES:  No known drug allergies.  OUTPATIENT MEDICATIONS:  None listed in Epic.  PAST MEDICAL DIAGNOSES:  She denies having significant medical problems.  PAST SURGICAL HISTORY:  She has had 3 C-sections.     SOCIAL HISTORY:  She does drink alcohol occasionally.  Denies illicit drug   use.  Nonsmoker.     PHYSICAL EXAMINATION:  VITAL SIGNS:  Temperature 98.4, heart rate 108, respiratory rate 18, blood   pressure 178/82, pulse oximetry 95% on room air.  GENERAL APPEARANCE:  The patient is alert and oriented, pleasant, cooperative,   in no acute distress.  MUSCULOSKELETAL:  Left lower extremity is externally rotated and shortened.    She is nontender to palpation of the knee.  There is no obvious knee effusion   present.  She is able to dorsi and plantarflex the foot and flex and extend   the toes.  There is no evidence of obvious traumatic deformity to the right   lower or bilateral upper extremities, which are grossly neurovascularly   intact.     DIAGNOSTIC IMAGING:  Plain x-rays, AP pelvis and 2 views of the left femur   show a left comminuted displaced intertrochanteric femur fracture.     ASSESSMENT:  A 73-year-old female who has a left displaced intertrochanteric   femur fracture with comminution.  She is being evaluated  for admission to the   hospitalist service.     RECOMMENDATIONS:  1.  I discussed these findings with the patient and I do feel that she would   benefit from surgical reduction and fixation for her proximal femur fracture.    We discussed briefly options for nonoperative management, which she did not   feel is the most appropriate treatment course.  We discussed potential risks   of surgery such as infection, loss of fixation, potential for injury to   neurovascular structures, potential for fixation failure requiring further   surgery as well as general risks of anesthesia.  She expressed understanding   and wished to proceed with surgery when possible.  2.  She should be bed rest and I recommend she can be n.p.o. after midnight   and make preparations to go to the operating room, hopefully tomorrow morning   for surgical fixation.        ______________________________  MD SAMEERA Duffy/MEL/IVANNA    DD:  04/14/2023 20:30  DT:  04/14/2023 22:20    Job#:  [id number]

## 2023-04-15 NOTE — THERAPY
04/15/23 0858   Interdisciplinary Plan of Care Collaboration   Collaboration Comments OT consult received. Pt pending surgical reduction and fixation for her proximal femur fracture. Will follow up post op as appropriate/able.

## 2023-04-15 NOTE — H&P
Hospital Medicine History & Physical Note    Date of Service  4/14/2023    Primary Care Physician  No primary care provider on file.    Consultants  Orthopedic (Dr. Isidro)    Code Status  Full Code    Chief Complaint  Chief Complaint   Patient presents with    GLF     Late last night approx 01:00am pt fell in bedroom after walking to the bathroom, pt was not able to get up and was eventually found by her friend at 16:00 today, downtime approx 15 hours, pt denies head strike or LOC, pt c/o L hip pain    Hip Pain     L hip s/p GLF last night, +CMS        History of Presenting Illness  Lorena No is a 73 y.o. female with history alcohol dependence who presented 4/14/2023 with evaluation for ground-level fall.  Patient reported having mechanical fall as she was walking to the bathroom yesterday, noted to have fallen on her left hip.  She was unable to get back, found by friend approximately 4 PM today.  EMS was called and brought to ER for evaluation.  Noted to have left hip fracture on x-ray.  Orthopedic has been consulted, tentative plan for OR in AM.  Admitted to medicine service for further evaluation and treatment.    I discussed the plan of care with patient and bedside RN.    Review of Systems  Review of Systems   Constitutional:  Negative for chills and fever.   HENT:  Negative for hearing loss and tinnitus.    Eyes:  Negative for blurred vision and double vision.   Respiratory:  Negative for cough and shortness of breath.    Cardiovascular:  Positive for palpitations. Negative for chest pain.   Gastrointestinal:  Negative for abdominal pain, heartburn, nausea and vomiting.   Genitourinary:  Negative for dysuria and hematuria.   Musculoskeletal:  Positive for falls, joint pain and myalgias.   Skin:  Negative for itching and rash.   Neurological:  Positive for weakness. Negative for dizziness and headaches.   Endo/Heme/Allergies:  Negative for environmental allergies. Does not bruise/bleed easily.    Psychiatric/Behavioral:  Negative for depression and substance abuse.    All other systems reviewed and are negative.    Past Medical History   has no past medical history on file.  Alcohol dependence    Surgical History   has no past surgical history on file.   Prior C-sections    Family History  family history is not on file.   Family history reviewed with patient. There is no family history that is pertinent to the chief complaint.     Social History   Former tobacco smoker  Admits to drinking 3 to 4 glasses of wine daily  Denies illicit drug use  Lives alone independently at home    Allergies  No Known Allergies    Medications  None       Physical Exam  Temp:  [36.9 °C (98.4 °F)] 36.9 °C (98.4 °F)  Pulse:  [108] 108  Resp:  [18] 18  BP: (166-178)/(64-88) 178/88  SpO2:  [95 %-97 %] 95 %  Blood Pressure : (!) 178/88   Temperature: 36.9 °C (98.4 °F)   Pulse: (!) 108   Respiration: 18   Pulse Oximetry: 95 %       Physical Exam  Vitals and nursing note reviewed.   Constitutional:       General: She is not in acute distress.  HENT:      Head: Normocephalic and atraumatic.      Nose: Nose normal.      Mouth/Throat:      Mouth: Mucous membranes are dry.      Pharynx: Oropharynx is clear.   Eyes:      General: No scleral icterus.     Extraocular Movements: Extraocular movements intact.   Cardiovascular:      Rate and Rhythm: Regular rhythm. Tachycardia present.      Pulses: Normal pulses.      Heart sounds:     No friction rub.   Pulmonary:      Effort: No respiratory distress.      Breath sounds: No wheezing or rales.   Chest:      Chest wall: No tenderness.   Abdominal:      General: There is no distension.      Tenderness: There is no abdominal tenderness. There is no guarding or rebound.   Musculoskeletal:         General: Tenderness (left hip) and signs of injury present.      Cervical back: Neck supple. No tenderness.      Right lower leg: No edema.      Left lower leg: No edema.      Comments: Decreased ROM at  LLE   Skin:     General: Skin is warm and dry.      Capillary Refill: Capillary refill takes less than 2 seconds.   Neurological:      General: No focal deficit present.      Mental Status: She is alert and oriented to person, place, and time.   Psychiatric:         Mood and Affect: Mood normal.       Laboratory:  Recent Labs     04/14/23 1934   WBC 19.5*   RBC 3.81*   HEMOGLOBIN 11.7*   HEMATOCRIT 34.9*   MCV 91.6   MCH 30.7   MCHC 33.5*   RDW 42.4   PLATELETCT 312   MPV 10.0     Recent Labs     04/14/23 1934   SODIUM 137   POTASSIUM 4.0   CHLORIDE 100   CO2 21   GLUCOSE 157*   BUN 25*   CREATININE 0.64   CALCIUM 9.5     Recent Labs     04/14/23 1934   ALTSGPT 25   ASTSGOT 45   ALKPHOSPHAT 65   TBILIRUBIN 0.8   GLUCOSE 157*         Recent Labs     04/14/23 1934   NTPROBNP 224*         Recent Labs     04/14/23 1934   TROPONINT 12       Imaging:  DX-FEMUR-2+ LEFT   Final Result            Acute comminuted intertrochanteric fracture of the left femur.      DX-PELVIS-1 OR 2 VIEWS   Final Result         Acute comminuted intertrochanteric fracture of the left femur.      DX-CHEST-LIMITED (1 VIEW)   Final Result         No acute cardiopulmonary abnormalities are identified.          X-Ray:  I have personally reviewed the images and compared with prior images.  EKG:  I have personally reviewed the images and compared with prior images.    Assessment/Plan:  Justification for Admission Status  I anticipate this patient will require at least 2 midnights hospitalization, therefore appropriate for inpatient status.        * Hip fracture requiring operative repair, left, closed, initial encounter (HCC)- (present on admission)  Assessment & Plan  S/p GLF  Acute comminuted intertrochanteric fracture of left femur noted on x-ray  Fall precautions  Supportive pain control  PT/OT after the OR  Orthopedic follow-up appreciated -- planned for OR in AM    Traumatic rhabdomyolysis (HCC)  Assessment & Plan  S/p fall  CPK  1049  IVF    Hyperglycemia  Assessment & Plan  Likely reactive    Alcohol dependence (HCC)  Assessment & Plan  Multivitamins  Monitor for CIWA    Leukocytosis  Assessment & Plan  Likely reactive  No focal consolidation seen on CXR  Check UA    Hypertensive urgency  Assessment & Plan  Does not take any meds at home  Likely secondary to uncontrolled pain  Supportive pain control  As needed IV hydralazine and IV labetalol    Fall  Assessment & Plan  Fall precautions  PT/OT    ACP (advance care planning)  Assessment & Plan  Discussed in ED. Patient is agreeable for CPR/defibrillation/intubation or mechanical ventilation.  She is also agreeable for anticipated surgery for repair hip, as well as other necessary invasive and noninvasive medical management. FULL code status confirmed.  ACP: 17mins        VTE prophylaxis: enoxaparin ppx

## 2023-04-15 NOTE — PROGRESS NOTES
Hospital Medicine Daily Progress Note    Date of Service  4/15/2023    Chief Complaint  Lorena No is a 73 y.o. female admitted 4/14/2023 with ,chief      Hospital Course  This 73-year-old female with a past medical significant medical dependence presented to the ER on 4/14/2023 after she had a ground-level fall.  Report that she was walking to the bathroom yesterday had a fall; unable to bear weight on the left side.    Imaging showed left hip fracture.  Orthopedic surgery was consulted, patient underwent Open treatment with intramedullary nailing, left intertrochanteric femur fracture. Patient can bear weight as tolerated.    Interval events:  -- No acute events overnight, vital sign has been stable, patient is alert, awake, answering questions appropriately.  Currently requiring 2 L of oxygen  --WBC is elevated at 12.8, monitor, likely reactive.  Hemoglobin 9.4.  Will have repeat CBC tomorrow  --Patient underwent surgery today, will obtain PT/OT, DVT prophylaxis, multimodal pain management.  For orthopedic surgery recommendation  --continue  po thiamine and folic acid     I have discussed this patient's plan of care and discharge plan at IDT rounds today with Case Management, Nursing, Nursing leadership, and other members of the IDT team.    Consultants/Specialty  orthopedics    Code Status  Full Code    Disposition  Patient is not medically cleared for discharge.   Anticipate discharge to to skilled nursing facility.  I have placed the appropriate orders for post-discharge needs.    Review of Systems  Review of Systems   Constitutional:  Positive for malaise/fatigue. Negative for fever.   HENT:  Negative for congestion.    Eyes:  Negative for blurred vision and double vision.   Respiratory:  Negative for cough.    Cardiovascular:  Negative for chest pain and orthopnea.   Gastrointestinal:  Negative for abdominal pain, heartburn, nausea and vomiting.   Musculoskeletal:  Positive for joint pain and myalgias.    Neurological:  Negative for headaches.   Psychiatric/Behavioral:  The patient is nervous/anxious.    All other systems reviewed and are negative.     Physical Exam  Temp:  [36.1 °C (96.9 °F)-36.9 °C (98.4 °F)] 36.5 °C (97.7 °F)  Pulse:  [] 80  Resp:  [12-48] 16  BP: (119-197)/() 124/64  SpO2:  [92 %-100 %] 97 %    Physical Exam  Vitals and nursing note reviewed.   HENT:      Head: Normocephalic.   Eyes:      Extraocular Movements: Extraocular movements intact.   Cardiovascular:      Rate and Rhythm: Normal rate.   Pulmonary:      Breath sounds: Normal breath sounds.   Abdominal:      General: Bowel sounds are normal. There is no distension.      Palpations: Abdomen is soft.   Musculoskeletal:      Cervical back: Neck supple.   Neurological:      Mental Status: She is alert and oriented to person, place, and time.      Cranial Nerves: No cranial nerve deficit.       Fluids    Intake/Output Summary (Last 24 hours) at 4/15/2023 1411  Last data filed at 4/15/2023 1044  Gross per 24 hour   Intake 1750 ml   Output 250 ml   Net 1500 ml       Laboratory  Recent Labs     04/14/23  1934 04/15/23  0308   WBC 19.5* 12.8*   RBC 3.81* 3.07*   HEMOGLOBIN 11.7* 9.4*   HEMATOCRIT 34.9* 27.8*   MCV 91.6 90.6   MCH 30.7 30.6   MCHC 33.5* 33.8   RDW 42.4 42.5   PLATELETCT 312 208   MPV 10.0 10.1     Recent Labs     04/14/23  1934 04/15/23  0308   SODIUM 137 137   POTASSIUM 4.0 3.7   CHLORIDE 100 105   CO2 21 20   GLUCOSE 157* 99   BUN 25* 24*   CREATININE 0.64 0.59   CALCIUM 9.5 8.3*                   Imaging  DX-FEMUR-2+ LEFT   Final Result      Portable fluoroscopic images obtained in the OR for localization during left femur internal fixation.      DX-FEMUR-2+ LEFT   Final Result            Acute comminuted intertrochanteric fracture of the left femur.      DX-PELVIS-1 OR 2 VIEWS   Final Result         Acute comminuted intertrochanteric fracture of the left femur.      DX-CHEST-LIMITED (1 VIEW)   Final Result          No acute cardiopulmonary abnormalities are identified.      DX-PORTABLE FLUORO > 1 HOUR    (Results Pending)        Assessment/Plan  * Hip fracture requiring operative repair, left, closed, initial encounter (HCC)- (present on admission)  Assessment & Plan  S/p GLF  Acute comminuted intertrochanteric fracture of left femur noted on x-ray   Orthopedic surgery was consulted, patient underwent Open treatment with intramedullary nailing, left intertrochanteric femur fracture. Patient can bear weight as tolerated.  PT/OT, DVT prophylaxis, multimodal pain management      Hyperglycemia  Assessment & Plan  Likely reactive    ACP (advance care planning)  Assessment & Plan  Discussed in ED. Patient is agreeable for CPR/defibrillation/intubation or mechanical ventilation.  She is also agreeable for anticipated surgery for repair hip, as well as other necessary invasive and noninvasive medical management. FULL code status confirmed.  ACP: 17mins    Alcohol dependence ([facility])  Assessment & Plan  Multivitamins  Monitor for CIWA    Leukocytosis  Assessment & Plan  Likely reactive  No focal consolidation seen on CXR  Check UA    Traumatic rhabdomyolysis ([facility])  Assessment & Plan  S/p fall  CPK 1049  IVF    Hypertensive urgency  Assessment & Plan  Does not take any meds at home  Likely secondary to uncontrolled pain  Supportive pain control  As needed IV hydralazine and IV labetalol    Fall  Assessment & Plan  Fall precautions  PT/OT         VTE prophylaxis: enoxaparin ppx

## 2023-04-15 NOTE — ANESTHESIA PREPROCEDURE EVALUATION
Case: [id number] Date/Time: 04/15/23 0800    Procedure: INSERTION, INTRAMEDULLARY CONSTANCE, FEMUR, PROXIMAL -KEYA (Left)    Location: [facility] OR 11 / SURGERY [facility]    Surgeons: Elvin Isidro M.D.      72 yo female.  Ground level fall while walking to bathroom (tripped on rub) 0100 4-14.  Down est 15 hours.  Found at 1600 4-14.    P Med Hx:  EtOH use 3-4/d    P Surg Hx:  C-sections  No reported problems with prior anesthesia    Former smoker    NPO    Hct=27.8      Relevant Problems   CARDIAC   (positive) Hypertensive urgency       Physical Exam    Airway   Mallampati: II  TM distance: >3 FB  Neck ROM: full       Cardiovascular - normal exam  Rhythm: regular  Rate: normal  (-) murmur     Dental - normal exam           Pulmonary - normal exam  Breath sounds clear to auscultation     Abdominal    Neurological - normal exam                 Anesthesia Plan    ASA 2       Plan - general       Airway plan will be ETT          Induction: intravenous    Postoperative Plan: Postoperative administration of opioids is intended.    Pertinent diagnostic labs and testing reviewed    Informed Consent:    Anesthetic plan and risks discussed with patient.    Use of blood products discussed with: patient whom consented to blood products.

## 2023-04-15 NOTE — OR SURGEON
Immediate Post OP Note    PreOp Diagnosis: Left IT femur fracture      PostOp Diagnosis: same      Procedure(s):  INSERTION, INTRAMEDULLARY CONSTANCE, FEMUR, PROXIMAL - Wound Class: Clean    Surgeon(s):  Elvin Isidro M.D.    Anesthesiologist/Type of Anesthesia:  Anesthesiologist: Henry Coronel M.D./General    Surgical Staff:  Circulator: Tegan Ramirez R.N.  Scrub Person: John Blanc  Radiology Technologist: Annie Borrero    Specimens removed if any:  * No specimens in log *    Estimated Blood Loss: 150cc    Findings: see dictation    Complications: none known    PLAN:  --readmit postop  --WBAT LLE  --ancef x 2 doses postop  --PT/OT for mobilization ASAP  --okay to start DVT chemoprophylaxis tomorrow if otherwise clinically appropriate, continue SCDs  --fu 2 weeks postop        4/15/2023 9:19 AM Elvin Isidro M.D.

## 2023-04-15 NOTE — ED PROVIDER NOTES
ED Provider Note    Scribed for Raffaele Brown by Yoly Ewing. 4/14/2023  6:58 PM    Primary care provider: No primary care provider noted.  Means of arrival: EMS  History obtained from: Patient  History limited by: None    CHIEF COMPLAINT  Chief Complaint   Patient presents with    GLF     Late last night approx 01:00am pt fell in bedroom after walking to the bathroom, pt was not able to get up and was eventually found by her friend at 16:00 today, downtime approx 15 hours, pt denies head strike or LOC, pt c/o L hip pain    Hip Pain     L hip s/p GLF last night, +CMS        EXTERNAL RECORDS REVIEWED  Outpatient Notes patient was seen in May 2022 at her outpatient office visit for a closed displaced fracture of the proximal end of the right humerus    HPI/ROS  LIMITATION TO HISTORY   Select: : None  OUTSIDE HISTORIAN(S):  None    HPI  Lorena No is a 73 y.o. female who presents to the ED via EMS for evaluation following a ground level fall onset last night. The patient states she fell last night at approximately 1 am after tripping on a rug in the bedroom, and was not able to get up until she was found by her friend at 4 pm today. She also experiences acute left hip pain, mild neck pain, right knee pain, and right shin pain. Patient denies shoulder pain, head strike, or loss of consciousness. Denies blood thinner usage. No alleviating or exacerbating factors reported. Patient adds that she drank wine last night, but has not drank any alcohol today. Patient denies drug usage.     REVIEW OF SYSTEMS  As above, all other systems reviewed and are negative.   See HPI for further details.     PAST MEDICAL HISTORY  None noted.    SURGICAL HISTORY  patient denies any surgical history    SOCIAL HISTORY  Social History     Substance and Sexual Activity   Drug Use None noted     FAMILY HISTORY  No family history noted.    CURRENT MEDICATIONS  Home Medications       Reviewed by Malcolm Wilson (Pharmacy Tech) on  04/14/23 at 2102  Med List Status: Complete     Medication Last Dose Status   CALCIUM PO 4/14/2023 Active   multivitamin Tab 4/14/2023 Active                  ALLERGIES  No Known Allergies    PHYSICAL EXAM    VITAL SIGNS:   Vitals:    04/14/23 1901 04/14/23 2000 04/14/23 2100 04/14/23 2130   BP: (!) 178/88 (!) 182/77 (!) 169/75 (!) 157/70   Pulse: (!) 108 (!) 112 (!) 108 (!) 104   Resp:  (!) 21 (!) 22 15   Temp:       TempSrc:       SpO2: 95% 97% 96% 96%   Weight:       Height:         Vitals: My interpretation: Hypertensive, tachycardic, afebrile, not hypoxic    Reinterpretation of vitals: Hypertensive, persistent tachycardia, no hypoxia    Cardiac Monitor Interpretation: The cardiac monitor revealed normal Sinus Rhythm with tachycardia as interpreted by me. The cardiac monitor was ordered secondary to the patient's history of tachycardia and to monitor for dysrhythmia and/or tachycardia.    PE:   Gen: sitting comfortably, speaking clearly, appears in no acute distress.   ENT: Mucous membranes moist, posterior pharynx clear, uvula midline, nares patent bilaterally.  Neck: Supple, FROM  Pulmonary: Lungs are clear to auscultation bilaterally. No tachypnea  CV: Tachycardia, no murmur appreciated, pulses 2+ in both upper and lower extremities  Abdomen: soft, NT/ND; no rebound/guarding, tenderness to the left hip  : no CVA or suprapubic tenderness   Neuro: A&Ox4 (person, place, time, situation), speech fluent, gait steady, no focal deficits appreciated  Skin: No rash or lesions.  No pallor or jaundice.  No cyanosis.  Warm and dry.  Extremities: Significant tenderness and pain in left hip, Decreased range of motion secondary to pain, Pulses intact, Capillary refill is normal    DIAGNOSTIC STUDIES / PROCEDURES    LABS  Results for orders placed or performed during the hospital encounter of 04/14/23   CBC WITH DIFFERENTIAL   Result Value Ref Range    WBC 19.5 (H) 4.8 - 10.8 K/uL    RBC 3.81 (L) 4.20 - 5.40 M/uL     Hemoglobin 11.7 (L) 12.0 - 16.0 g/dL    Hematocrit 34.9 (L) 37.0 - 47.0 %    MCV 91.6 81.4 - 97.8 fL    MCH 30.7 27.0 - 33.0 pg    MCHC 33.5 (L) 33.6 - 35.0 g/dL    RDW 42.4 35.9 - 50.0 fL    Platelet Count 312 164 - 446 K/uL    MPV 10.0 9.0 - 12.9 fL    Neutrophils-Polys 84.80 (H) 44.00 - 72.00 %    Lymphocytes 5.50 (L) 22.00 - 41.00 %    Monocytes 8.80 0.00 - 13.40 %    Eosinophils 0.10 0.00 - 6.90 %    Basophils 0.20 0.00 - 1.80 %    Immature Granulocytes 0.60 0.00 - 0.90 %    Nucleated RBC 0.00 /100 WBC    Neutrophils (Absolute) 16.54 (H) 2.00 - 7.15 K/uL    Lymphs (Absolute) 1.07 1.00 - 4.80 K/uL    Monos (Absolute) 1.71 (H) 0.00 - 0.85 K/uL    Eos (Absolute) 0.02 0.00 - 0.51 K/uL    Baso (Absolute) 0.04 0.00 - 0.12 K/uL    Immature Granulocytes (abs) 0.11 0.00 - 0.11 K/uL    NRBC (Absolute) 0.00 K/uL   COMP METABOLIC PANEL   Result Value Ref Range    Sodium 137 135 - 145 mmol/L    Potassium 4.0 3.6 - 5.5 mmol/L    Chloride 100 96 - 112 mmol/L    Co2 21 20 - 33 mmol/L    Anion Gap 16.0 7.0 - 16.0    Glucose 157 (H) 65 - 99 mg/dL    Bun 25 (H) 8 - 22 mg/dL    Creatinine 0.64 0.50 - 1.40 mg/dL    Calcium 9.5 8.5 - 10.5 mg/dL    AST(SGOT) 45 12 - 45 U/L    ALT(SGPT) 25 2 - 50 U/L    Alkaline Phosphatase 65 30 - 99 U/L    Total Bilirubin 0.8 0.1 - 1.5 mg/dL    Albumin 4.4 3.2 - 4.9 g/dL    Total Protein 7.9 6.0 - 8.2 g/dL    Globulin 3.5 1.9 - 3.5 g/dL    A-G Ratio 1.3 g/dL   TROPONIN   Result Value Ref Range    Troponin T 12 6 - 19 ng/L   proBrain Natriuretic Peptide, NT   Result Value Ref Range    NT-proBNP 224 (H) 0 - 125 pg/mL   LACTIC ACID   Result Value Ref Range    Lactic Acid 1.7 0.5 - 2.0 mmol/L   CREATINE KINASE   Result Value Ref Range    CPK Total 1049 (H) 0 - 154 U/L   CORRECTED CALCIUM   Result Value Ref Range    Correct Calcium 9.2 8.5 - 10.5 mg/dL   ESTIMATED GFR   Result Value Ref Range    GFR (CKD-EPI) 94 >60 mL/min/1.73 m 2   PROCALCITONIN   Result Value Ref Range    Procalcitonin 0.20 <0.25  ng/mL   DIAGNOSTIC ALCOHOL   Result Value Ref Range    Diagnostic Alcohol <10.1 <10.1 mg/dL   URINALYSIS   Result Value Ref Range    Color Yellow     Character Clear     Specific Gravity 1.022 <1.035    Ph 5.0 5.0 - 8.0    Glucose Negative Negative mg/dL    Ketones 40 (A) Negative mg/dL    Protein Negative Negative mg/dL    Bilirubin Negative Negative    Urobilinogen, Urine 0.2 Negative    Nitrite Negative Negative    Leukocyte Esterase Negative Negative    Occult Blood Negative Negative    Micro Urine Req see below    URINE CULTURE(NEW)    Specimen: Urine   Result Value Ref Range    Significant Indicator NEG     Source UR     Site -     Culture Result -    EKG (NOW)   Result Value Ref Range    Report       [facility] Emergency Dept.    Test Date:  2023  Pt Name:    OLIMPIA ALAS                 Department: ER  MRN:        [id number]                      Room:        14  Gender:     Female                       Technician: [id number]  :        1950                   Requested By:EVERARDO DAVENPORT  Order #:    [id number]                    Reading MD:    Measurements  Intervals                                Axis  Rate:       101                          P:          82  AR:         147                          QRS:        57  QRSD:       92                           T:          45  QT:         364  QTc:        472    Interpretive Statements  Sinus tachycardia  Borderline repolarization abnormality  No previous ECG available for comparison        All labs reviewed by me. Labs were compared to prior labs if they were available. Significant for leukocytosis of 19,000 possibly reactive in nature, mild anemia, normal electrolytes, normal renal function, normal liver enzymes, normal bilirubin, troponin negative, BNP normal, lactic acid normal, CPK of 1000    RADIOLOGY  I have independently interpreted the diagnostic imaging associated with this visit and am waiting the final reading from the  radiologist.   My preliminary interpretation is a follows: Obvious fracture that is impacted on the left hip  Radiologist interpretation is as follows:  DX-FEMUR-2+ LEFT   Final Result            Acute comminuted intertrochanteric fracture of the left femur.      DX-PELVIS-1 OR 2 VIEWS   Final Result         Acute comminuted intertrochanteric fracture of the left femur.      DX-CHEST-LIMITED (1 VIEW)   Final Result         No acute cardiopulmonary abnormalities are identified.        COURSE & MEDICAL DECISION MAKING  Nursing notes, VS, PMSFHx, labs, imaging, EKG reviewed in chart.    Heart Score: Low    ED Observation Status? No; Patient does not meet criteria for ED Observation.     Ddx: Strain, foot sprain, fracture, dislocation, rhabdomyolysis, electrolyte derangement, dehydration    MDM: 6:58 PM Lorena No is a 73 y.o. female who presented with acute severe left hip pain and concern for fracture, by EMS.  Patient apparently tripped last night had a mechanical fall landing on her left hip.  She denies head trauma or injury.  Unfortunately she is unable to get up due to pain and immobility of the left hip.  She is on the floor for approximately 15 hours before friends called, came over and called EMS.  Upon arrival here she is alert and oriented, no signs of head neck chest or abdominal trauma.  Pelvis stable.  Obvious tenderness in the left hip however but neurovascular tact in the left lower extremity.  Patient underwent x-rays for likely hip fracture but also medical work-up for dehydration and possible rhabdomyolysis.  X-ray on my review shows an acute compacted intertrochanteric fracture of the left but the rest the pelvis is unremarkable and chest is within normal limits.  Radiologist agrees.  All labs reviewed by me. Labs were compared to prior labs if they were available. Significant for leukocytosis of 19,000 possibly reactive in nature, mild anemia, normal electrolytes, normal renal function, normal  liver enzymes, normal bilirubin, troponin negative, BNP normal, lactic acid normal, CPK of 1000.  She was started on a liter of IV fluids for slight tachycardia likely secondary to dehydration.  I think that her white count is likely reactive in nature but hospitalist will continue to follow-up on it, she remains afebrile and otherwise no complaints.  Discussed with Dr. Isidro orthopedic surgery who will see the patient the morning for surgical evaluation.  Discussed the hospitalist who is amenable to admission, monitoring patient's white count, and continue IV fluids for hydration. EKG for pre-op shows no acute ischemic changes.     7:42 PM - I discussed the patient's case and the above findings with Dr. Isidro (Ortho) who will see the patient in the morning.    8:30 PM - I discussed the patient's case and the above findings with Dr. Vegas (Hospitalist) who agrees to evaluate the patient for hospitalization.    Ultrasound Guided Fascia Iliaca Block Note: CPT code 99625 [Injection, anesthetic agent (femoral nerve, single)]  Indication: Pain control   Consent: I have discussed with the patient and/or the patient representative the indication, alternatives, and the possible risks and/or complications of the planned procedure and the anesthesia methods. The patient and/or patient representative appear to understand and agree to proceed.  Procedure: The patient was placed in the appropriate position. The procedure was performed under ultrasound guidance of the left hip with a fascia iliaca block. The area was sterilized using ChloraPrep. Anaesthesia was obtained using 30 cc cc of 0.5% Bupivacaine with epinephrine.   The patient tolerated the procedure well.  Complications: None    Ultrasound guidance was used during this procedure for visualization of needle placement and injection location   CPT code 63108 [Ultrasonic guidance for needle placement (e.g. biopsy, aspiration, injection, localization device) imaging  supervision and interpretation]   hip block     HYDRATION: Based on the patient's presentation of Acute Vomiting, Dehydration and Inability to take oral fluids the patient was given IV fluids. IV Hydration was used because oral hydration was not adequate alone. Upon recheck following hydration, the patient was imroved.    ADDITIONAL PROBLEM LIST AND DISPOSITION    I have discussed management of the patient with the following physicians and MELIA's:  Dr. Isidro (Ortho), Dr. Vegas (Hospitalist)    Discussion of management with other QHP or appropriate source(s): None     Escalation of care considered, and ultimately not performed: None    Barriers to care at this time, including but not limited to: Patient does not have established PCP.     FINAL IMPRESSION  1. Closed fracture of left hip, initial encounter (HCC) Acute   2. Dehydration Acute   3. Fall from ground level Acute   4. Sinus tachycardia Acute      Yoly CORDON (Deborah), am scribing for, and in the presence of, Raffaele Brown.    Electronically signed by: Yoly Ewing (Deborah), 4/14/2023    IRaffaele personally performed the services described in this documentation, as scribed by Yoly Ewing in my presence, and it is both accurate and complete.    The note accurately reflects work and decisions made by me.  Raffaele Brown  4/14/2023  9:12 PM

## 2023-04-15 NOTE — PROGRESS NOTES
Patient arrived to the unit and using slide board patient was transferred into her bed. Questions and concerns addressed and education  provided to patient.     4 Eyes Skin Assessment Completed by MITCHELL Gamboa and MITCHELL Cunha.    Head WDL  Ears WDL  Nose WDL  Mouth WDL  Neck WDL  Breast/Chest WDL  Shoulder Blades WDL  Spine WDL  (R) Arm/Elbow/Hand Blanching, Bruising, and Scab  (L) Arm/Elbow/Hand Blanching, Bruising, and Scab  Abdomen WDL  Groin Redness and Blanching  Scrotum/Coccyx/Buttocks Redness and Blanching  (R) Leg WDL  (L) Leg Blanching, Swelling, and Edema, around fracture site, leg has deformity and is shorter than right leg.   (R) Heel/Foot/Toe WDL  (L) Heel/Foot/Toe WDL          Devices In Places Blood Pressure Cuff and Pulse Ox, pure wick canister       Interventions In Place Waffle Overlay, Pillows, Barrier Cream, Dri-Garrick Pads, Heels Loaded W/Pillows, and Pressure Redistribution Mattress    Possible Skin Injury No    Pictures Uploaded Into Epic N/A  Wound Consult Placed N/A  RN Wound Prevention Protocol Ordered No

## 2023-04-15 NOTE — ANESTHESIA POSTPROCEDURE EVALUATION
Patient: Lorena No    Procedure Summary     Date: 04/15/23 Room / Location: [place] [address] / SURGERY [facility]    Anesthesia Start: 0809 Anesthesia Stop: 0926    Procedure: INSERTION, INTRAMEDULLARY CONSTANCE, FEMUR, PROXIMAL (Left: Leg Upper) Diagnosis: (Left proximal femur fracture)    Surgeons: Elvin Isidro M.D. Responsible Provider: Henry Coronel M.D.    Anesthesia Type: general ASA Status: 2          Final Anesthesia Type: general  Last vitals  BP   Blood Pressure : (!) 141/64    Temp   36.1 °C (96.9 °F)    Pulse   95   Resp   16    SpO2   96 %      Anesthesia Post Evaluation    Patient location during evaluation: PACU  Patient participation: complete - patient participated  Level of consciousness: awake and alert  Pain score: 2    Airway patency: patent  Anesthetic complications: no  Cardiovascular status: hemodynamically stable  Respiratory status: acceptable  Hydration status: euvolemic    PONV: none          No notable events documented.     Nurse Pain Score: 5 (NPRS)

## 2023-04-16 LAB
ALBUMIN SERPL BCP-MCNC: 3.2 G/DL (ref 3.2–4.9)
BUN SERPL-MCNC: 13 MG/DL (ref 8–22)
CALCIUM ALBUM COR SERPL-MCNC: 8.9 MG/DL (ref 8.5–10.5)
CALCIUM SERPL-MCNC: 8.3 MG/DL (ref 8.5–10.5)
CHLORIDE SERPL-SCNC: 108 MMOL/L (ref 96–112)
CO2 SERPL-SCNC: 23 MMOL/L (ref 20–33)
CREAT SERPL-MCNC: 0.53 MG/DL (ref 0.5–1.4)
ERYTHROCYTE [DISTWIDTH] IN BLOOD BY AUTOMATED COUNT: 44.6 FL (ref 35.9–50)
GFR SERPLBLD CREATININE-BSD FMLA CKD-EPI: 99 ML/MIN/1.73 M 2
GLUCOSE SERPL-MCNC: 120 MG/DL (ref 65–99)
HCT VFR BLD AUTO: 23.7 % (ref 37–47)
HGB BLD-MCNC: 7.7 G/DL (ref 12–16)
MCH RBC QN AUTO: 30.7 PG (ref 27–33)
MCHC RBC AUTO-ENTMCNC: 32.5 G/DL (ref 33.6–35)
MCV RBC AUTO: 94.4 FL (ref 81.4–97.8)
PHOSPHATE SERPL-MCNC: 1.3 MG/DL (ref 2.5–4.5)
PLATELET # BLD AUTO: 197 K/UL (ref 164–446)
PMV BLD AUTO: 9.9 FL (ref 9–12.9)
POTASSIUM SERPL-SCNC: 3.9 MMOL/L (ref 3.6–5.5)
RBC # BLD AUTO: 2.51 M/UL (ref 4.2–5.4)
SODIUM SERPL-SCNC: 139 MMOL/L (ref 135–145)
WBC # BLD AUTO: 11.3 K/UL (ref 4.8–10.8)

## 2023-04-16 PROCEDURE — 700111 HCHG RX REV CODE 636 W/ 250 OVERRIDE (IP): Performed by: ORTHOPAEDIC SURGERY

## 2023-04-16 PROCEDURE — 700101 HCHG RX REV CODE 250: Performed by: STUDENT IN AN ORGANIZED HEALTH CARE EDUCATION/TRAINING PROGRAM

## 2023-04-16 PROCEDURE — 80069 RENAL FUNCTION PANEL: CPT

## 2023-04-16 PROCEDURE — 36415 COLL VENOUS BLD VENIPUNCTURE: CPT

## 2023-04-16 PROCEDURE — 770001 HCHG ROOM/CARE - MED/SURG/GYN PRIV*

## 2023-04-16 PROCEDURE — A9270 NON-COVERED ITEM OR SERVICE: HCPCS | Performed by: STUDENT IN AN ORGANIZED HEALTH CARE EDUCATION/TRAINING PROGRAM

## 2023-04-16 PROCEDURE — 85027 COMPLETE CBC AUTOMATED: CPT

## 2023-04-16 PROCEDURE — 97162 PT EVAL MOD COMPLEX 30 MIN: CPT

## 2023-04-16 PROCEDURE — 99233 SBSQ HOSP IP/OBS HIGH 50: CPT | Performed by: HOSPITALIST

## 2023-04-16 PROCEDURE — 700102 HCHG RX REV CODE 250 W/ 637 OVERRIDE(OP): Performed by: STUDENT IN AN ORGANIZED HEALTH CARE EDUCATION/TRAINING PROGRAM

## 2023-04-16 PROCEDURE — 700111 HCHG RX REV CODE 636 W/ 250 OVERRIDE (IP): Performed by: STUDENT IN AN ORGANIZED HEALTH CARE EDUCATION/TRAINING PROGRAM

## 2023-04-16 PROCEDURE — 700105 HCHG RX REV CODE 258: Performed by: ORTHOPAEDIC SURGERY

## 2023-04-16 RX ADMIN — ENOXAPARIN SODIUM 40 MG: 40 INJECTION SUBCUTANEOUS at 17:54

## 2023-04-16 RX ADMIN — THERA TABS 1 TABLET: TAB at 05:17

## 2023-04-16 RX ADMIN — DOCUSATE SODIUM 50 MG AND SENNOSIDES 8.6 MG 2 TABLET: 8.6; 5 TABLET, FILM COATED ORAL at 05:17

## 2023-04-16 RX ADMIN — Medication 100 MG: at 05:17

## 2023-04-16 RX ADMIN — ACETAMINOPHEN 650 MG: 325 TABLET, FILM COATED ORAL at 17:52

## 2023-04-16 RX ADMIN — ACETAMINOPHEN 650 MG: 325 TABLET, FILM COATED ORAL at 12:09

## 2023-04-16 RX ADMIN — CEFAZOLIN 2 G: 2 INJECTION, POWDER, FOR SOLUTION INTRAMUSCULAR; INTRAVENOUS at 00:16

## 2023-04-16 RX ADMIN — OXYCODONE 5 MG: 5 TABLET ORAL at 08:16

## 2023-04-16 RX ADMIN — ACETAMINOPHEN 650 MG: 325 TABLET, FILM COATED ORAL at 05:16

## 2023-04-16 RX ADMIN — DOCUSATE SODIUM 50 MG AND SENNOSIDES 8.6 MG 2 TABLET: 8.6; 5 TABLET, FILM COATED ORAL at 17:52

## 2023-04-16 RX ADMIN — LIDOCAINE 1 PATCH: 700 PATCH TOPICAL at 20:42

## 2023-04-16 RX ADMIN — ACETAMINOPHEN 650 MG: 325 TABLET, FILM COATED ORAL at 23:34

## 2023-04-16 RX ADMIN — ACETAMINOPHEN 650 MG: 325 TABLET, FILM COATED ORAL at 00:12

## 2023-04-16 RX ADMIN — CELECOXIB 200 MG: 200 CAPSULE ORAL at 17:52

## 2023-04-16 RX ADMIN — CELECOXIB 200 MG: 200 CAPSULE ORAL at 05:17

## 2023-04-16 RX ADMIN — FOLIC ACID 1 MG: 1 TABLET ORAL at 05:18

## 2023-04-16 ASSESSMENT — LIFESTYLE VARIABLES
NAUSEA AND VOMITING: NO NAUSEA AND NO VOMITING
PAROXYSMAL SWEATS: NO SWEAT VISIBLE
TREMOR: NO TREMOR
AGITATION: NORMAL ACTIVITY
PAROXYSMAL SWEATS: NO SWEAT VISIBLE
AUDITORY DISTURBANCES: NOT PRESENT
TREMOR: NO TREMOR
AGITATION: NORMAL ACTIVITY
ORIENTATION AND CLOUDING OF SENSORIUM: ORIENTED AND CAN DO SERIAL ADDITIONS
AUDITORY DISTURBANCES: NOT PRESENT
HEADACHE, FULLNESS IN HEAD: NOT PRESENT
PAROXYSMAL SWEATS: NO SWEAT VISIBLE
NAUSEA AND VOMITING: NO NAUSEA AND NO VOMITING
ORIENTATION AND CLOUDING OF SENSORIUM: ORIENTED AND CAN DO SERIAL ADDITIONS
TREMOR: NO TREMOR
AUDITORY DISTURBANCES: NOT PRESENT
HEADACHE, FULLNESS IN HEAD: NOT PRESENT
VISUAL DISTURBANCES: NOT PRESENT
TOTAL SCORE: 0
ANXIETY: NO ANXIETY (AT EASE)
AGITATION: NORMAL ACTIVITY
VISUAL DISTURBANCES: NOT PRESENT
NAUSEA AND VOMITING: NO NAUSEA AND NO VOMITING
TOTAL SCORE: 0
ORIENTATION AND CLOUDING OF SENSORIUM: ORIENTED AND CAN DO SERIAL ADDITIONS
TOTAL SCORE: 0
VISUAL DISTURBANCES: NOT PRESENT
HEADACHE, FULLNESS IN HEAD: NOT PRESENT

## 2023-04-16 ASSESSMENT — ENCOUNTER SYMPTOMS
NERVOUS/ANXIOUS: 1
DOUBLE VISION: 0
HEADACHES: 0
ABDOMINAL PAIN: 0
VOMITING: 0
COUGH: 0
MYALGIAS: 1
ORTHOPNEA: 0
HEARTBURN: 0
NAUSEA: 0
BLURRED VISION: 0
FEVER: 0

## 2023-04-16 ASSESSMENT — COGNITIVE AND FUNCTIONAL STATUS - GENERAL
MOBILITY SCORE: 12
SUGGESTED CMS G CODE MODIFIER MOBILITY: CL
WALKING IN HOSPITAL ROOM: A LITTLE
MOVING TO AND FROM BED TO CHAIR: UNABLE
MOVING FROM LYING ON BACK TO SITTING ON SIDE OF FLAT BED: UNABLE
STANDING UP FROM CHAIR USING ARMS: A LITTLE
CLIMB 3 TO 5 STEPS WITH RAILING: A LOT
TURNING FROM BACK TO SIDE WHILE IN FLAT BAD: A LOT

## 2023-04-16 ASSESSMENT — GAIT ASSESSMENTS
DEVIATION: ANTALGIC;STEP TO;DECREASED BASE OF SUPPORT;BRADYKINETIC;SHUFFLED GAIT;DECREASED HEEL STRIKE;DECREASED TOE OFF
ASSISTIVE DEVICE: FRONT WHEEL WALKER
GAIT LEVEL OF ASSIST: MINIMAL ASSIST
DISTANCE (FEET): 50

## 2023-04-16 ASSESSMENT — PAIN DESCRIPTION - PAIN TYPE
TYPE: SURGICAL PAIN

## 2023-04-16 NOTE — PROGRESS NOTES
Hospital Medicine Daily Progress Note    Date of Service  4/16/2023    Chief Complaint  Lorena No is a 73 y.o. female admitted 4/14/2023 with ,chief      Hospital Course  This 73-year-old female with a past medical significant medical dependence presented to the ER on 4/14/2023 after she had a ground-level fall.  Report that she was walking to the bathroom yesterday had a fall; unable to bear weight on the left side.    Imaging showed left hip fracture.  Orthopedic surgery was consulted, patient underwent Open treatment with intramedullary nailing, left intertrochanteric femur fracture. Patient can bear weight as tolerated.    Interval events:  -- No acute events overnight, vital sign has been stable, patient is alert, awake, answering questions appropriately.  Currently requiring 2 L of oxygen  --WBC is elevated at 12.8, monitor, likely reactive.  Hemoglobin 9.4.  Will have repeat CBC tomorrow  --Patient underwent surgery today, will obtain PT/OT, DVT prophylaxis, multimodal pain management.  For orthopedic surgery recommendation  --continue  po thiamine and folic acid     4/16:  -- No acute events overnight, vital signs been stable, patient is alert, awake, answering questions appropriately.  PT/OT eval pending.  -- Ortho  Surgery following, patient with diabetes well controlled, continue multimodal pain management, DVT prophylaxis, stool softener.    I have discussed this patient's plan of care and discharge plan at IDT rounds today with Case Management, Nursing, Nursing leadership, and other members of the IDT team.    Consultants/Specialty  orthopedics    Code Status  Full Code    Disposition  Patient is not medically cleared for discharge.   Anticipate discharge to to skilled nursing facility.  I have placed the appropriate orders for post-discharge needs.    Review of Systems  Review of Systems   Constitutional:  Positive for malaise/fatigue. Negative for fever.   HENT:  Negative for congestion.    Eyes:   Negative for blurred vision and double vision.   Respiratory:  Negative for cough.    Cardiovascular:  Negative for chest pain and orthopnea.   Gastrointestinal:  Negative for abdominal pain, heartburn, nausea and vomiting.   Musculoskeletal:  Positive for joint pain and myalgias.   Neurological:  Negative for headaches.   Psychiatric/Behavioral:  The patient is nervous/anxious.    All other systems reviewed and are negative.     Physical Exam  Temp:  [36.5 °C (97.7 °F)-37.6 °C (99.7 °F)] 37.6 °C (99.7 °F)  Pulse:  [] 86  Resp:  [15-18] 15  BP: ()/(50-57) 99/54  SpO2:  [86 %-98 %] 91 %    Physical Exam  Vitals and nursing note reviewed.   HENT:      Head: Normocephalic.   Eyes:      Extraocular Movements: Extraocular movements intact.   Cardiovascular:      Rate and Rhythm: Normal rate.   Pulmonary:      Breath sounds: Normal breath sounds.   Abdominal:      General: Bowel sounds are normal. There is no distension.      Palpations: Abdomen is soft.   Musculoskeletal:      Cervical back: Neck supple.   Neurological:      Mental Status: She is alert and oriented to person, place, and time.      Cranial Nerves: No cranial nerve deficit.       Fluids    Intake/Output Summary (Last 24 hours) at 4/16/2023 1250  Last data filed at 4/15/2023 1525  Gross per 24 hour   Intake 50 ml   Output --   Net 50 ml         Laboratory  Recent Labs     04/14/23  1934 04/15/23  0308   WBC 19.5* 12.8*   RBC 3.81* 3.07*   HEMOGLOBIN 11.7* 9.4*   HEMATOCRIT 34.9* 27.8*   MCV 91.6 90.6   MCH 30.7 30.6   MCHC 33.5* 33.8   RDW 42.4 42.5   PLATELETCT 312 208   MPV 10.0 10.1       Recent Labs     04/14/23  1934 04/15/23  0308   SODIUM 137 137   POTASSIUM 4.0 3.7   CHLORIDE 100 105   CO2 21 20   GLUCOSE 157* 99   BUN 25* 24*   CREATININE 0.64 0.59   CALCIUM 9.5 8.3*                     Imaging  DX-FEMUR-2+ LEFT   Final Result      Portable fluoroscopic images obtained in the OR for localization during left femur internal fixation.       DX-FEMUR-2+ LEFT   Final Result            Acute comminuted intertrochanteric fracture of the left femur.      DX-PELVIS-1 OR 2 VIEWS   Final Result         Acute comminuted intertrochanteric fracture of the left femur.      DX-CHEST-LIMITED (1 VIEW)   Final Result         No acute cardiopulmonary abnormalities are identified.      DX-PORTABLE FLUORO > 1 HOUR    (Results Pending)          Assessment/Plan  * Hip fracture requiring operative repair, left, closed, initial encounter (HCC)- (present on admission)  Assessment & Plan  S/p GLF  Acute comminuted intertrochanteric fracture of left femur noted on x-ray   Orthopedic surgery was consulted, patient underwent Open treatment with intramedullary nailing, left intertrochanteric femur fracture. Patient can bear weight as tolerated.  PT/OT, DVT prophylaxis, multimodal pain management      Hyperglycemia  Assessment & Plan  Likely reactive    ACP (advance care planning)  Assessment & Plan  Discussed in ED. Patient is agreeable for CPR/defibrillation/intubation or mechanical ventilation.  She is also agreeable for anticipated surgery for repair hip, as well as other necessary invasive and noninvasive medical management. FULL code status confirmed.  ACP: 17mins    Alcohol dependence ([facility])  Assessment & Plan  Multivitamins  Monitor for CIWA    Leukocytosis  Assessment & Plan  Likely reactive  No focal consolidation seen on CXR  Check UA    Traumatic rhabdomyolysis ([facility])  Assessment & Plan  S/p fall  CPK 1049      Hypertensive urgency  Assessment & Plan  Does not take any meds at home  Likely secondary to uncontrolled pain  Supportive pain control  As needed IV hydralazine and IV labetalol    Fall  Assessment & Plan  Fall precautions  PT/OT         VTE prophylaxis: enoxaparin ppx

## 2023-04-16 NOTE — CARE PLAN
The patient is Stable - Low risk of patient condition declining or worsening    Shift Goals  Clinical Goals: pain mgmt, mobility  Patient Goals: rest  Family Goals: not comfort    Progress made toward(s) clinical / shift goals:    Problem: Fall Risk  Goal: Patient will remain free from falls  Outcome: Progressing  Note: Safety precautions are in place including bed locked and in lowest position, upper bed rails up, bed alarm on, call light within reach, treaded socks on, tray table and personal belongings within reach.        Patient is not progressing towards the following goals:

## 2023-04-16 NOTE — CARE PLAN
The patient is Watcher - Medium risk of patient condition declining or worsening    Shift Goals  Clinical Goals: pain control and comfort  Patient Goals: Rest  Family Goals: comfort    Progress made toward(s) clinical / shift goals:    Problem: Pain - Standard  Goal: Alleviation of pain or a reduction in pain to the patient’s comfort goal  Outcome: Progressing     Problem: Knowledge Deficit - Standard  Goal: Patient and family/care givers will demonstrate understanding of plan of care, disease process/condition, diagnostic tests and medications  Outcome: Progressing     Problem: Optimal Care for Alcohol Withdrawal  Goal: Optimal Care for the alcohol withdrawal patient  Outcome: Progressing     Problem: Seizure Precautions  Goal: Implementation of seizure precautions  Outcome: Progressing     Problem: Lifestyle Changes  Goal: Patient's ability to identify lifestyle changes and available resources to help reduce recurrence of condition will improve  Outcome: Progressing     Problem: Fall Risk  Goal: Patient will remain free from falls  Outcome: Progressing       Patient is not progressing towards the following goals:

## 2023-04-16 NOTE — THERAPY
Physical Therapy   Initial Evaluation     Patient Name: Lorena No  Age:  73 y.o., Sex:  female  Medical Record #: [id number]  Today's Date: 4/16/2023     Precautions  Precautions: (P) Fall Risk;Weight Bearing As Tolerated Left Lower Extremity    Assessment  Patient is 73 y.o. female admitted to the hospital following GLF at home. Pt suffered from a L comminute displaced intertrochanteric fracture. Pt underwent IM marina placement to the L femur and is now WBAT L E. Pt with history of ETOH dependence. Pt reports that she lives also, would have intermittent support from family. At time of initial evaluation, pt completed all mobility tasks at minimal assistance level. Pt required assist to manage LLE in and out of bed and minimal assist for sit to stand. PT ambulated approximately 50 feet in hallway with FWW, minimal assist. PT with reduced WB through surgical LE despite VC for WBAT. Pt's gait pattern did decline with fatigue with increased assistance required by end of session. Given pt's current home situation with living alone and stairs to enter her home, she will require post-acute placemen to improve mobility prior to DC home.     Plan    Physical Therapy Initial Treatment Plan   Treatment Plan : (P) Bed Mobility, Gait Training, Neuro Re-Education / Balance, Self Care / Home Evaluation, Stair Training, Therapeutic Activities, Therapeutic Exercise  Treatment Frequency: (P) 3 Times per Week  Duration: (P) Until Therapy Goals Met    DC Equipment Recommendations: (P) Front-Wheel Walker  Discharge Recommendations: (P) Recommend post-acute placement for additional physical therapy services prior to discharge home        04/16/23 1115   Precautions   Precautions Fall Risk;Weight Bearing As Tolerated Left Lower Extremity   Pain 0 - 10 Group   Location Hip   Location Orientation Left   Therapist Pain Assessment During Activity;6  (increased pain with ambulation)   Non Verbal Descriptors   Non Verbal Scale  Calm   Prior  "Living Situation   Prior Services None   Housing / Facility Mobile Home   Steps Into Home 4   Rail Both Rail (Steps into Home)   Bathroom Set up Walk In Shower;Shower Chair;Grab Bars   Equipment Owned Single Point Cane   Lives with - Patient's Self Care Capacity Alone and Able to Care For Self   Comments Pt reports that she lives alone, has a neighbor across the stress that could help intermittently, family could also check on pt as needed   Prior Level of Functional Mobility   Bed Mobility Independent   Transfer Status Independent   Ambulation Independent   Ambulation Distance Community distances   Stairs Independent   Comments Pt reports no mobility issues prior to injury. Pt reports this the \"first major\" fall she has had but did not elaborate   History of Falls   History of Falls Yes   Date of Last Fall   (reason for admit)   Cognition    Cognition / Consciousness WDL   Level of Consciousness Alert   Comments Pleasant and cooperative   Passive ROM Lower Body   Comments L LE with end range limitations due to guarding   Active ROM Lower Body    Comments L LE limited by pain and post-op status   Strength Lower Body   Comments L LE limited by pain and post op status   Sensation Lower Body   Lower Extremity Sensation   WDL   Balance Assessment   Sitting Balance (Static) Fair   Sitting Balance (Dynamic) Fair   Standing Balance (Static) Fair -   Standing Balance (Dynamic) Poor +   Weight Shift Sitting Fair   Weight Shift Standing Poor   Comments Standing balance with FWW and assist from PT   Bed Mobility    Supine to Sit Minimal Assist   Sit to Supine Minimal Assist   Scooting Minimal Assist   Comments HOB partially elevated for supine to sit   Gait Analysis   Gait Level Of Assist Minimal Assist   Assistive Device Front Wheel Walker   Distance (Feet) 50   # of Times Distance was Traveled 1   Deviation Antalgic;Step To;Decreased Base Of Support;Bradykinetic;Shuffled Gait;Decreased Heel Strike;Decreased Toe Off   Weight " Bearing Status WBAT L LE   Comments Pt ambulated short distance in hallway with FWW, minimal assist. Pt with decreased WB Through L LE despite verbal cues. With increased distance gait deviations increased due to pain levels. Pt required more assistance at end of walk due to fatigue   Functional Mobility   Sit to Stand Minimal Assist   Bed, Chair, Wheelchair Transfer Minimal Assist   Transfer Method Stand Step   Mobility Ambulated short distance in hallway   How much difficulty does the patient currently have...   Turning over in bed (including adjusting bedclothes, sheets and blankets)? 2   Sitting down on and standing up from a chair with arms (e.g., wheelchair, bedside commode, etc.) 1   Moving from lying on back to sitting on the side of the bed? 1   How much help from another person does the patient currently need...   Moving to and from a bed to a chair (including a wheelchair)? 3   Need to walk in a hospital room? 3   Climbing 3-5 steps with a railing? 2   6 clicks Mobility Score 12   Activity Tolerance   Sitting Edge of Bed 6 min   Standing 6 min   Comments Limited by pain   Patient / Family Goals    Patient / Family Goal #1 Get better   Short Term Goals    Short Term Goal # 1 Pt will perform supine to sit with SBA within 6 sessions to allow pt to get in and out of bed   Short Term Goal # 2 Pt will perform sit to stand with minimal assist within 6 sessions to allow pt to transfer between surfaces   Short Term Goal # 3 Pt will ambulate 100 feet with FWW with SBA within 6 sessions to allow pt to access home environment   Short Term Goal # 4 Pt will ascend/descend 4 steps with rails as needed with CGA within 6 sessions to allow pt to access home   Education Group   Education Provided Role of Physical Therapist;Weight Bearing Status   Role of Physical Therapist Patient Response Patient;Acceptance;Explanation;Verbal Demonstration   Weight Bearing Status Patient Response Patient;Acceptance;Explanation;Verbal  Demonstration   Physical Therapy Initial Treatment Plan    Treatment Plan  Bed Mobility;Gait Training;Neuro Re-Education / Balance;Self Care / Home Evaluation;Stair Training;Therapeutic Activities;Therapeutic Exercise   Treatment Frequency 3 Times per Week   Duration Until Therapy Goals Met   Problem List    Problems Pain;Impaired Bed Mobility;Impaired Transfers;Impaired Ambulation;Functional Strength Deficit;Impaired Balance;Decreased Activity Tolerance   Anticipated Discharge Equipment and Recommendations   DC Equipment Recommendations Front-Wheel Walker   Discharge Recommendations Recommend post-acute placement for additional physical therapy services prior to discharge home

## 2023-04-16 NOTE — PROGRESS NOTES
"      Orthopaedic Progress Note    Interval changes:  Patient doing well postop.  LLE dressings are CDI  WBAT LLE  Hgb drop within expected range postoperatively  Cleared for DC to home with possibly home health by ortho pending therapy clearance    ROS - Patient denies any new issues.  Pain well controlled.    BP 99/54   Pulse 86   Temp 37.6 °C (99.7 °F) (Temporal)   Resp 15   Ht 1.575 m (5' 2\")   Wt 52.2 kg (115 lb)   SpO2 91%     Patient seen and examined  No acute distress  Breathing non labored  RRR  LLE: Surgical dressing is clean, dry, and intact. Patient clearly fires tibialis anterior, EHL, and gastrocnemius/soleus. Sensation is intact to light touch throughout superficial peroneal, deep peroneal, tibial, saphenous, and sural nerve distributions. Strong and palpable 2+ dorsalis pedis and posterior tibial pulses with capillary refill less than 2 seconds. No lower leg tenderness or discomfort.      Recent Labs     04/14/23  1934 04/15/23  0308   WBC 19.5* 12.8*   RBC 3.81* 3.07*   HEMOGLOBIN 11.7* 9.4*   HEMATOCRIT 34.9* 27.8*   MCV 91.6 90.6   MCH 30.7 30.6   MCHC 33.5* 33.8   RDW 42.4 42.5   PLATELETCT 312 208   MPV 10.0 10.1       Active Hospital Problems    Diagnosis     Hip fracture requiring operative repair, left, closed, initial encounter ([facility]) [S72.002A]     Fall [W19.XXXA]     Hypertensive urgency [I16.0]     Traumatic rhabdomyolysis ([facility]) [T79.6XXA]     Leukocytosis [D72.829]     Alcohol dependence ([facility]) [F10.20]     ACP (advance care planning) [Z71.89]     Hyperglycemia [R73.9]        Assessment/Plan:  Patient doing well postop.  LLE dressings are CDI  WBAT LLE  Hgb drop within expected range postoperatively  Cleared for DC to home with possibly home health by ortho pending therapy clearance    POD#1 S/p  Open treatment with intramedullary nailing, left   intertrochanteric femur fracture.  Wt bearing status - WBAT LLE  Wound care/Drains - Dressings to be changed PRN for saturation starting " POD#2  Future Procedures - none planned  Lovenox: Start 4/16, Duration-until ambulatory > 150'  Sutures/Staples out- 14-21 days post operatively. Removal will completed by ortho mid levels only.  PT/OT-initiated  Antibiotics:  Perioperative completed  DVT Prophylaxis- TEDS/SCDs/Foot pumps  Kang-not needed per ortho  Case Coordination for Discharge Planning - Disposition per therapy recs.

## 2023-04-16 NOTE — PROGRESS NOTES
73yoF with left displaced proximal femur fracture s/p IMN 4/15.    S: Doing well, family at bedside, patient says she was able to ambulate earlier today    O:  Vitals:    04/16/23 1500   BP: 113/62   Pulse:    Resp: 16   Temp: 36.4 °C (97.5 °F)   SpO2:      Exam:  General-NAD, alert and following commands  LLE-NVI distally, dressing c/d/I    A:73yoF with left displaced proximal femur fracture s/p IMN 4/15.    Recs:  --WBAT LLE  --ancef x 2 doses postop  --PT/OT for mobilization ASAP  --okay to start DVT chemoprophylaxis tomorrow if otherwise clinically appropriate, continue SCDs  --fu 2 weeks postop

## 2023-04-17 ENCOUNTER — HOSPITAL ENCOUNTER (INPATIENT)
Facility: REHABILITATION | Age: 73
LOS: 12 days | DRG: 560 | End: 2023-04-29
Attending: PHYSICAL MEDICINE & REHABILITATION | Admitting: PHYSICAL MEDICINE & REHABILITATION
Payer: MEDICARE

## 2023-04-17 VITALS
RESPIRATION RATE: 15 BRPM | HEART RATE: 73 BPM | TEMPERATURE: 98 F | SYSTOLIC BLOOD PRESSURE: 121 MMHG | WEIGHT: 115 LBS | HEIGHT: 62 IN | BODY MASS INDEX: 21.16 KG/M2 | OXYGEN SATURATION: 96 % | DIASTOLIC BLOOD PRESSURE: 56 MMHG

## 2023-04-17 DIAGNOSIS — S72.002A HIP FRACTURE REQUIRING OPERATIVE REPAIR, LEFT, CLOSED, INITIAL ENCOUNTER (HCC): ICD-10-CM

## 2023-04-17 DIAGNOSIS — Z87.81 S/P LEFT HIP FRACTURE: ICD-10-CM

## 2023-04-17 LAB
ALBUMIN SERPL BCP-MCNC: 2.9 G/DL (ref 3.2–4.9)
BACTERIA UR CULT: NORMAL
BUN SERPL-MCNC: 9 MG/DL (ref 8–22)
CALCIUM ALBUM COR SERPL-MCNC: 9.3 MG/DL (ref 8.5–10.5)
CALCIUM SERPL-MCNC: 8.4 MG/DL (ref 8.5–10.5)
CHLORIDE SERPL-SCNC: 109 MMOL/L (ref 96–112)
CO2 SERPL-SCNC: 24 MMOL/L (ref 20–33)
CREAT SERPL-MCNC: 0.47 MG/DL (ref 0.5–1.4)
ERYTHROCYTE [DISTWIDTH] IN BLOOD BY AUTOMATED COUNT: 44.8 FL (ref 35.9–50)
GFR SERPLBLD CREATININE-BSD FMLA CKD-EPI: 102 ML/MIN/1.73 M 2
GLUCOSE SERPL-MCNC: 102 MG/DL (ref 65–99)
HCT VFR BLD AUTO: 21.4 % (ref 37–47)
HGB BLD-MCNC: 7.2 G/DL (ref 12–16)
MCH RBC QN AUTO: 31.3 PG (ref 27–33)
MCHC RBC AUTO-ENTMCNC: 33.6 G/DL (ref 33.6–35)
MCV RBC AUTO: 93 FL (ref 81.4–97.8)
PHOSPHATE SERPL-MCNC: 1.8 MG/DL (ref 2.5–4.5)
PLATELET # BLD AUTO: 191 K/UL (ref 164–446)
PMV BLD AUTO: 9.8 FL (ref 9–12.9)
POTASSIUM SERPL-SCNC: 3.9 MMOL/L (ref 3.6–5.5)
RBC # BLD AUTO: 2.3 M/UL (ref 4.2–5.4)
SARS-COV-2 RNA RESP QL NAA+PROBE: NOTDETECTED
SIGNIFICANT IND 70042: NORMAL
SITE SITE: NORMAL
SODIUM SERPL-SCNC: 140 MMOL/L (ref 135–145)
SOURCE SOURCE: NORMAL
SPECIMEN SOURCE: NORMAL
WBC # BLD AUTO: 7.2 K/UL (ref 4.8–10.8)

## 2023-04-17 PROCEDURE — 99223 1ST HOSP IP/OBS HIGH 75: CPT | Performed by: PHYSICAL MEDICINE & REHABILITATION

## 2023-04-17 PROCEDURE — 80069 RENAL FUNCTION PANEL: CPT

## 2023-04-17 PROCEDURE — 97535 SELF CARE MNGMENT TRAINING: CPT

## 2023-04-17 PROCEDURE — 85027 COMPLETE CBC AUTOMATED: CPT

## 2023-04-17 PROCEDURE — 94760 N-INVAS EAR/PLS OXIMETRY 1: CPT

## 2023-04-17 PROCEDURE — 700102 HCHG RX REV CODE 250 W/ 637 OVERRIDE(OP): Performed by: PHYSICAL MEDICINE & REHABILITATION

## 2023-04-17 PROCEDURE — U0005 INFEC AGEN DETEC AMPLI PROBE: HCPCS

## 2023-04-17 PROCEDURE — A9270 NON-COVERED ITEM OR SERVICE: HCPCS | Performed by: STUDENT IN AN ORGANIZED HEALTH CARE EDUCATION/TRAINING PROGRAM

## 2023-04-17 PROCEDURE — A9270 NON-COVERED ITEM OR SERVICE: HCPCS | Performed by: PHYSICAL MEDICINE & REHABILITATION

## 2023-04-17 PROCEDURE — 700102 HCHG RX REV CODE 250 W/ 637 OVERRIDE(OP): Performed by: STUDENT IN AN ORGANIZED HEALTH CARE EDUCATION/TRAINING PROGRAM

## 2023-04-17 PROCEDURE — A9270 NON-COVERED ITEM OR SERVICE: HCPCS | Performed by: HOSPITALIST

## 2023-04-17 PROCEDURE — 97166 OT EVAL MOD COMPLEX 45 MIN: CPT

## 2023-04-17 PROCEDURE — 36415 COLL VENOUS BLD VENIPUNCTURE: CPT

## 2023-04-17 PROCEDURE — 99239 HOSP IP/OBS DSCHRG MGMT >30: CPT | Performed by: HOSPITALIST

## 2023-04-17 PROCEDURE — 770010 HCHG ROOM/CARE - REHAB SEMI PRIVAT*

## 2023-04-17 PROCEDURE — 700101 HCHG RX REV CODE 250: Performed by: PHYSICAL MEDICINE & REHABILITATION

## 2023-04-17 PROCEDURE — U0003 INFECTIOUS AGENT DETECTION BY NUCLEIC ACID (DNA OR RNA); SEVERE ACUTE RESPIRATORY SYNDROME CORONAVIRUS 2 (SARS-COV-2) (CORONAVIRUS DISEASE [COVID-19]), AMPLIFIED PROBE TECHNIQUE, MAKING USE OF HIGH THROUGHPUT TECHNOLOGIES AS DESCRIBED BY CMS-2020-01-R: HCPCS

## 2023-04-17 PROCEDURE — 700102 HCHG RX REV CODE 250 W/ 637 OVERRIDE(OP): Performed by: HOSPITALIST

## 2023-04-17 PROCEDURE — 700111 HCHG RX REV CODE 636 W/ 250 OVERRIDE (IP): Performed by: PHYSICAL MEDICINE & REHABILITATION

## 2023-04-17 RX ORDER — GAUZE BANDAGE 2" X 2"
100 BANDAGE TOPICAL DAILY
Status: COMPLETED | OUTPATIENT
Start: 2023-04-18 | End: 2023-04-18

## 2023-04-17 RX ORDER — LIDOCAINE 50 MG/G
1 PATCH TOPICAL EVERY 24 HOURS
Status: CANCELLED | OUTPATIENT
Start: 2023-04-17

## 2023-04-17 RX ORDER — HYDRALAZINE HYDROCHLORIDE 25 MG/1
25 TABLET, FILM COATED ORAL EVERY 8 HOURS PRN
Status: DISCONTINUED | OUTPATIENT
Start: 2023-04-17 | End: 2023-04-29 | Stop reason: HOSPADM

## 2023-04-17 RX ORDER — FOLIC ACID 1 MG/1
1 TABLET ORAL DAILY
Qty: 30 TABLET | Status: SHIPPED
Start: 2023-04-18

## 2023-04-17 RX ORDER — ACETAMINOPHEN 325 MG/1
650 TABLET ORAL EVERY 4 HOURS PRN
Status: DISCONTINUED | OUTPATIENT
Start: 2023-04-17 | End: 2023-04-17

## 2023-04-17 RX ORDER — AMOXICILLIN 250 MG
2 CAPSULE ORAL 2 TIMES DAILY
Qty: 30 TABLET | Refills: 0 | Status: ON HOLD
Start: 2023-04-17 | End: 2023-04-28

## 2023-04-17 RX ORDER — ENOXAPARIN SODIUM 100 MG/ML
40 INJECTION SUBCUTANEOUS DAILY
Status: DISCONTINUED | OUTPATIENT
Start: 2023-04-17 | End: 2023-04-29 | Stop reason: HOSPADM

## 2023-04-17 RX ORDER — CELECOXIB 100 MG/1
200 CAPSULE ORAL 2 TIMES DAILY PRN
Status: DISCONTINUED | OUTPATIENT
Start: 2023-04-19 | End: 2023-04-29 | Stop reason: HOSPADM

## 2023-04-17 RX ORDER — CELECOXIB 200 MG/1
200 CAPSULE ORAL 2 TIMES DAILY PRN
Status: CANCELLED | OUTPATIENT
Start: 2023-04-19

## 2023-04-17 RX ORDER — LIDOCAINE 50 MG/G
1 PATCH TOPICAL EVERY 24 HOURS
Status: DISCONTINUED | OUTPATIENT
Start: 2023-04-17 | End: 2023-04-29 | Stop reason: HOSPADM

## 2023-04-17 RX ORDER — ONDANSETRON 2 MG/ML
4 INJECTION INTRAMUSCULAR; INTRAVENOUS 4 TIMES DAILY PRN
Status: DISCONTINUED | OUTPATIENT
Start: 2023-04-17 | End: 2023-04-29 | Stop reason: HOSPADM

## 2023-04-17 RX ORDER — AMOXICILLIN 250 MG
2 CAPSULE ORAL 2 TIMES DAILY
Status: DISCONTINUED | OUTPATIENT
Start: 2023-04-17 | End: 2023-04-29 | Stop reason: HOSPADM

## 2023-04-17 RX ORDER — ALUMINA, MAGNESIA, AND SIMETHICONE 2400; 2400; 240 MG/30ML; MG/30ML; MG/30ML
20 SUSPENSION ORAL
Status: DISCONTINUED | OUTPATIENT
Start: 2023-04-17 | End: 2023-04-29 | Stop reason: HOSPADM

## 2023-04-17 RX ORDER — ACETAMINOPHEN 325 MG/1
650 TABLET ORAL EVERY 6 HOURS
Status: CANCELLED | OUTPATIENT
Start: 2023-04-17 | End: 2023-04-19

## 2023-04-17 RX ORDER — ACETAMINOPHEN 325 MG/1
650 TABLET ORAL EVERY 6 HOURS
Qty: 30 TABLET | Refills: 0 | Status: SHIPPED
Start: 2023-04-17

## 2023-04-17 RX ORDER — OXYCODONE HYDROCHLORIDE 5 MG/1
5 TABLET ORAL
Status: DISCONTINUED | OUTPATIENT
Start: 2023-04-17 | End: 2023-04-29 | Stop reason: HOSPADM

## 2023-04-17 RX ORDER — POLYETHYLENE GLYCOL 3350 17 G/17G
17 POWDER, FOR SOLUTION ORAL
Refills: 3 | Status: ON HOLD
Start: 2023-04-17 | End: 2023-04-28

## 2023-04-17 RX ORDER — FOLIC ACID 1 MG/1
1 TABLET ORAL DAILY
Status: COMPLETED | OUTPATIENT
Start: 2023-04-18 | End: 2023-04-18

## 2023-04-17 RX ORDER — GAUZE BANDAGE 2" X 2"
100 BANDAGE TOPICAL DAILY
Status: CANCELLED | OUTPATIENT
Start: 2023-04-18 | End: 2023-04-19

## 2023-04-17 RX ORDER — ENOXAPARIN SODIUM 100 MG/ML
40 INJECTION SUBCUTANEOUS DAILY
Status: CANCELLED | OUTPATIENT
Start: 2023-04-17

## 2023-04-17 RX ORDER — ACETAMINOPHEN 325 MG/1
650 TABLET ORAL EVERY 6 HOURS PRN
Status: CANCELLED | OUTPATIENT
Start: 2023-04-20

## 2023-04-17 RX ORDER — POLYETHYLENE GLYCOL 3350 17 G/17G
1 POWDER, FOR SOLUTION ORAL
Status: DISCONTINUED | OUTPATIENT
Start: 2023-04-17 | End: 2023-04-29 | Stop reason: HOSPADM

## 2023-04-17 RX ORDER — FOLIC ACID 1 MG/1
1 TABLET ORAL DAILY
Status: CANCELLED | OUTPATIENT
Start: 2023-04-18 | End: 2023-04-19

## 2023-04-17 RX ORDER — ECHINACEA PURPUREA EXTRACT 125 MG
2 TABLET ORAL PRN
Status: DISCONTINUED | OUTPATIENT
Start: 2023-04-17 | End: 2023-04-29 | Stop reason: HOSPADM

## 2023-04-17 RX ORDER — OMEPRAZOLE 20 MG/1
20 CAPSULE, DELAYED RELEASE ORAL DAILY
Status: DISCONTINUED | OUTPATIENT
Start: 2023-04-17 | End: 2023-04-29 | Stop reason: HOSPADM

## 2023-04-17 RX ORDER — OXYCODONE HYDROCHLORIDE 10 MG/1
10 TABLET ORAL
Status: DISCONTINUED | OUTPATIENT
Start: 2023-04-17 | End: 2023-04-29 | Stop reason: HOSPADM

## 2023-04-17 RX ORDER — CELECOXIB 100 MG/1
200 CAPSULE ORAL 2 TIMES DAILY
Status: COMPLETED | OUTPATIENT
Start: 2023-04-17 | End: 2023-04-19

## 2023-04-17 RX ORDER — ENOXAPARIN SODIUM 100 MG/ML
40 INJECTION SUBCUTANEOUS DAILY
Status: ON HOLD | DISCHARGE
Start: 2023-04-17 | End: 2023-04-28

## 2023-04-17 RX ORDER — CELECOXIB 200 MG/1
200 CAPSULE ORAL 2 TIMES DAILY
Status: CANCELLED | OUTPATIENT
Start: 2023-04-17 | End: 2023-04-19

## 2023-04-17 RX ORDER — ACETAMINOPHEN 325 MG/1
650 TABLET ORAL EVERY 6 HOURS
Status: DISPENSED | OUTPATIENT
Start: 2023-04-17 | End: 2023-04-19

## 2023-04-17 RX ORDER — TRAZODONE HYDROCHLORIDE 50 MG/1
50 TABLET ORAL
Status: DISCONTINUED | OUTPATIENT
Start: 2023-04-17 | End: 2023-04-29 | Stop reason: HOSPADM

## 2023-04-17 RX ORDER — BISACODYL 10 MG
10 SUPPOSITORY, RECTAL RECTAL
Status: DISCONTINUED | OUTPATIENT
Start: 2023-04-17 | End: 2023-04-29 | Stop reason: HOSPADM

## 2023-04-17 RX ORDER — LANOLIN ALCOHOL/MO/W.PET/CERES
100 CREAM (GRAM) TOPICAL DAILY
Status: ON HOLD
Start: 2023-04-18 | End: 2023-04-28

## 2023-04-17 RX ORDER — ONDANSETRON 4 MG/1
4 TABLET, ORALLY DISINTEGRATING ORAL 4 TIMES DAILY PRN
Status: DISCONTINUED | OUTPATIENT
Start: 2023-04-17 | End: 2023-04-29 | Stop reason: HOSPADM

## 2023-04-17 RX ORDER — ACETAMINOPHEN 325 MG/1
650 TABLET ORAL EVERY 6 HOURS PRN
Status: DISCONTINUED | OUTPATIENT
Start: 2023-04-20 | End: 2023-04-17

## 2023-04-17 RX ORDER — ACETAMINOPHEN 325 MG/1
650 TABLET ORAL EVERY 6 HOURS PRN
Status: DISCONTINUED | OUTPATIENT
Start: 2023-04-20 | End: 2023-04-29 | Stop reason: HOSPADM

## 2023-04-17 RX ADMIN — OMEPRAZOLE 20 MG: 20 CAPSULE, DELAYED RELEASE ORAL at 17:58

## 2023-04-17 RX ADMIN — SENNOSIDES AND DOCUSATE SODIUM 2 TABLET: 50; 8.6 TABLET ORAL at 21:05

## 2023-04-17 RX ADMIN — DIBASIC SODIUM PHOSPHATE, MONOBASIC POTASSIUM PHOSPHATE AND MONOBASIC SODIUM PHOSPHATE 500 MG: 852; 155; 130 TABLET ORAL at 13:38

## 2023-04-17 RX ADMIN — ACETAMINOPHEN 650 MG: 325 TABLET, FILM COATED ORAL at 05:22

## 2023-04-17 RX ADMIN — FOLIC ACID 1 MG: 1 TABLET ORAL at 05:24

## 2023-04-17 RX ADMIN — OXYCODONE HYDROCHLORIDE 10 MG: 10 TABLET ORAL at 21:35

## 2023-04-17 RX ADMIN — ENOXAPARIN SODIUM 40 MG: 40 INJECTION SUBCUTANEOUS at 17:57

## 2023-04-17 RX ADMIN — LIDOCAINE 1 PATCH: 50 PATCH TOPICAL at 21:08

## 2023-04-17 RX ADMIN — CELECOXIB 200 MG: 200 CAPSULE ORAL at 05:23

## 2023-04-17 RX ADMIN — ACETAMINOPHEN 650 MG: 325 TABLET ORAL at 17:58

## 2023-04-17 RX ADMIN — Medication 100 MG: at 05:24

## 2023-04-17 RX ADMIN — ACETAMINOPHEN 650 MG: 325 TABLET, FILM COATED ORAL at 13:38

## 2023-04-17 RX ADMIN — DOCUSATE SODIUM 50 MG AND SENNOSIDES 8.6 MG 2 TABLET: 8.6; 5 TABLET, FILM COATED ORAL at 05:23

## 2023-04-17 RX ADMIN — THERA TABS 1 TABLET: TAB at 05:24

## 2023-04-17 RX ADMIN — CELECOXIB 200 MG: 100 CAPSULE ORAL at 21:06

## 2023-04-17 ASSESSMENT — LIFESTYLE VARIABLES
EVER_SMOKED: YES
TOTAL SCORE: 1
HOW MANY TIMES IN THE PAST YEAR HAVE YOU HAD 5 OR MORE DRINKS IN A DAY: 325
VISUAL DISTURBANCES: NOT PRESENT
HEADACHE, FULLNESS IN HEAD: NOT PRESENT
PAROXYSMAL SWEATS: NO SWEAT VISIBLE
AUDITORY DISTURBANCES: NOT PRESENT
EVER FELT BAD OR GUILTY ABOUT YOUR DRINKING: NO
HAVE PEOPLE ANNOYED YOU BY CRITICIZING YOUR DRINKING: NO
TOTAL SCORE: 1
ANXIETY: NO ANXIETY (AT EASE)
ALCOHOL_USE: YES
HAVE YOU EVER FELT YOU SHOULD CUT DOWN ON YOUR DRINKING: YES
CONSUMPTION TOTAL: POSITIVE
ORIENTATION AND CLOUDING OF SENSORIUM: ORIENTED AND CAN DO SERIAL ADDITIONS
NAUSEA AND VOMITING: NO NAUSEA AND NO VOMITING
TOTAL SCORE: 0
TOTAL SCORE: 1
AGITATION: NORMAL ACTIVITY
TREMOR: NO TREMOR
AVERAGE NUMBER OF DAYS PER WEEK YOU HAVE A DRINK CONTAINING ALCOHOL: 7
ON A TYPICAL DAY WHEN YOU DRINK ALCOHOL HOW MANY DRINKS DO YOU HAVE: 3
EVER HAD A DRINK FIRST THING IN THE MORNING TO STEADY YOUR NERVES TO GET RID OF A HANGOVER: NO

## 2023-04-17 ASSESSMENT — COGNITIVE AND FUNCTIONAL STATUS - GENERAL
DAILY ACTIVITIY SCORE: 24
SUGGESTED CMS G CODE MODIFIER DAILY ACTIVITY: CH

## 2023-04-17 ASSESSMENT — PAIN DESCRIPTION - PAIN TYPE
TYPE: SURGICAL PAIN
TYPE: SURGICAL PAIN
TYPE: ACUTE PAIN

## 2023-04-17 ASSESSMENT — FIBROSIS 4 INDEX: FIB4 SCORE: 3.17

## 2023-04-17 ASSESSMENT — COPD QUESTIONNAIRES
HAVE YOU SMOKED AT LEAST 100 CIGARETTES IN YOUR ENTIRE LIFE: YES
COPD SCREENING SCORE: 4
DURING THE PAST 4 WEEKS HOW MUCH DID YOU FEEL SHORT OF BREATH: NONE/LITTLE OF THE TIME
DO YOU EVER COUGH UP ANY MUCUS OR PHLEGM?: NO/ONLY WITH OCCASIONAL COLDS OR INFECTIONS

## 2023-04-17 ASSESSMENT — ACTIVITIES OF DAILY LIVING (ADL): TOILETING: INDEPENDENT

## 2023-04-17 NOTE — H&P
Physical Medicine & Rehabilitation  History and Physical (H&P)  &     Post Admission Physician Evaluation (JURGEN)       Date of Admission: 4/17/2023  Date of Service: 4/17/2023   Lorena No    [facility] Code to Support Admission: 0008.11 - Orthopaedic Disorders: Status Post Unilateral Hip Fracture  Etiologic Diagnosis: Hip fracture requiring operative repair, left, closed, initial encounter (HCC)    _______________________________________________    Chief Complaint: Decreased mobility, leg pain    History of Present Illness:    Patient is a 73 y.o. with a PMH of HTN who presented to ED on 4/14/23 after fall at home. Patient reportedly fell the night prior and had worsening hip pain. She was found in ED to have left hip fracture. Patient underwent IMN treatment on 4/15/23 with Dr. Isidro for the left hip fracture. Post-operative course complicated by severe anemia, leukocytosis, and variable blood pressure.     Patient tolerated transfer to [facility].  She reports left hip pain is controlled at rest but transfers it hurts. She reports otherwise she is doing well. Denies SOB. Denies fever or chills.     Review of Systems:     Comprehensive 14 point ROS was reviewed and all were negative except as noted elsewhere in this document.     Past Medical History:  No past medical history on file.    Past Surgical History:  Past Surgical History:   Procedure Laterality Date    PB OPEN FIX INTER/SUBTROCH FX,IMPLNT Left 4/15/2023    Procedure: INSERTION, INTRAMEDULLARY CONSTANCE, FEMUR, PROXIMAL;  Surgeon: Elvin Isidro M.D.;  Location: SURGERY [facility];  Service: Orthopedics       Family History:  No family history on file.    Medications:  Current Facility-Administered Medications   Medication Dose    Respiratory Therapy Consult      Pharmacy Consult Request ...Pain Management Review 1 Each  1 Each    hydrALAZINE (APRESOLINE) tablet 25 mg  25 mg    senna-docusate (PERICOLACE or SENOKOT S) 8.6-50 MG per tablet 2 Tablet  2 Tablet     And    polyethylene glycol/lytes (MIRALAX) PACKET 1 Packet  1 Packet    And    magnesium hydroxide (MILK OF MAGNESIA) suspension 30 mL  30 mL    And    bisacodyl (DULCOLAX) suppository 10 mg  10 mg    omeprazole (PRILOSEC) capsule 20 mg  20 mg    mag hydrox-al hydrox-simeth (MAALOX PLUS ES or MYLANTA DS) suspension 20 mL  20 mL    ondansetron (ZOFRAN ODT) dispertab 4 mg  4 mg    Or    ondansetron (ZOFRAN) syringe/vial injection 4 mg  4 mg    traZODone (DESYREL) tablet 50 mg  50 mg    sodium chloride (OCEAN) 0.65 % nasal spray 2 Spray  2 Spray    oxyCODONE immediate-release (ROXICODONE) tablet 5 mg  5 mg    Or    oxyCODONE immediate release (ROXICODONE) tablet 10 mg  10 mg    acetaminophen (Tylenol) tablet 650 mg  650 mg    celecoxib (CELEBREX) capsule 200 mg  200 mg    Followed by    [START ON 4/19/2023] celecoxib (CELEBREX) capsule 200 mg  200 mg    enoxaparin (Lovenox) inj 40 mg  40 mg    lidocaine (LIDODERM) 5 % 1 Patch  1 Patch    [START ON 4/18/2023] thiamine (Vitamin B-1) tablet 100 mg  100 mg    And    [START ON 4/18/2023] multivitamin tablet 1 Tablet  1 Tablet    And    [START ON 4/18/2023] folic acid (FOLVITE) tablet 1 mg  1 mg    [START ON 4/20/2023] acetaminophen (Tylenol) tablet 650 mg  650 mg       Allergies:  Patient has no known allergies.    Psychosocial History:  Housing / Facility: Mobile Home  Steps Into Home: 4  Lives with - Patient's Self Care Capacity: Alone and Able to Care For Self  Equipment Owned: Single Point Cane     Prior Level of Function / Living Situation:   Physical Therapy: Prior Services: None  Housing / Facility: Mobile Home  Steps Into Home: 4  Rail: Both Rail (Steps into Home)  Bathroom Set up: Walk In Shower, Shower Chair, Grab Bars  Equipment Owned: Single Point Cane  Lives with - Patient's Self Care Capacity: Alone and Able to Care For Self  Bed Mobility: Independent  Transfer Status: Independent  Ambulation: Independent  Stairs: Independent  Current Level of Function:   Gait  "Level Of Assist: Minimal Assist  Assistive Device: Front Wheel Walker  Distance (Feet): 50  Deviation: Antalgic, Step To, Decreased Base Of Support, Bradykinetic, Shuffled Gait, Decreased Heel Strike, Decreased Toe Off  Weight Bearing Status: WBAT L LE  Supine to Sit: Minimal Assist  Sit to Supine: Minimal Assist  Scooting: Minimal Assist  Comments: HOB partially elevated for supine to sit  Sit to Stand: Minimal Assist  Bed, Chair, Wheelchair Transfer: Minimal Assist  Transfer Method: Stand Step  Sitting Edge of Bed: 6 min  Standin min    CURRENT LEVEL OF FUNCTION:   Same as level of function prior to admission to [facility]    Physical Examination:     VITAL SIGNS:   height is 1.575 m (5' 2\") and weight is 57.1 kg (125 lb 14.4 oz). Her oral temperature is 37.2 °C (98.9 °F). Her blood pressure is 127/79 and her pulse is 94. Her respiration is 18 and oxygen saturation is 97%.    GENERAL: No apparent distress  HEENT: Normocephalic/atraumatic, EOMI, and PERRL  CARDIAC: Regular rate and rhythm, normal S1, S2   LUNGS: Clear to auscultation   ABDOMINAL: bowel sounds present, soft, and nontender    EXTREMITIES: no contractures, spasticity, or edema.    NEURO:  Mental status:  A&Ox4 (person, place, date, situation) answers questions appropriately  Speech: fluent, no aphasia or dysarthria  Motor:    5/5 BUE  5/5 RLE  4/5 L HF and KE otherwise 5/5   Sensory: intact to light touch through out      Radiology:  XR Left femur  IMPRESSION:           Acute comminuted intertrochanteric fracture of the left femur.    Laboratory Values:  Recent Labs     04/15/23  03023  1401 23  0713   SODIUM 137 139 140   POTASSIUM 3.7 3.9 3.9   CHLORIDE 105 108 109   CO2 20 23 24   GLUCOSE 99 120* 102*   BUN 24* 13 9   CREATININE 0.59 0.53 0.47*   CALCIUM 8.3* 8.3* 8.4*     Recent Labs     04/15/23  03023  1401 23  0713   WBC 12.8* 11.3* 7.2   RBC 3.07* 2.51* 2.30*   HEMOGLOBIN 9.4* 7.7* 7.2* "   HEMATOCRIT 27.8* 23.7* 21.4*   MCV 90.6 94.4 93.0   MCH 30.6 30.7 31.3   MCHC 33.8 32.5* 33.6   RDW 42.5 44.6 44.8   PLATELETCT 208 197 191   MPV 10.1 9.9 9.8           Primary Rehabilitation Diagnosis:    This patient is a 73 y.o. female admitted for acute inpatient rehabilitation with   Hip fracture requiring operative repair, left, closed, initial encounter (formerly [facility]).    Impairments:   ADLs/IADLs  Mobility    Secondary Diagnosis/Medical Co-morbidities Affecting Function  Anemia  Leukocytosis  Azotemia  Hypocalcemia  Hypophosphatemia   Rhabdomyolysis     Relevant Changes Since Preadmission Evaluation:    Status unchanged    The patient's rehabilitation potential is Good  The patient's medical prognosis is good    Rehabilitation Plan:   Discussion and Recommendations:   1. The patient requires an acute inpatient rehabilitation program with a coordinated program of care at an intensity and frequency not available at a lower level of care. This recommendation is substantiated by the patient's medical physicians who recommend that the patient's intervention and assessment of medical issues needs to be done at an acute level of care for patient's safety and maximum outcome.   2. A coordinated program of care will be supplied by an interdisciplinary team of physical therapy, occupational therapy, rehab physician, rehab nursing, and, if needed, speech therapy and rehab psychology. Rehab team presents a patient-specific rehabilitation and education program concentrating on prevention of future problems related to accessibility, mobility, skin, bowel, bladder, sexuality, and psychosocial and medical/surgical problems.   3. Need for Rehabilitation Physician: The rehab physician will be evaluating the patient on a multi-weekly basis to help coordinate the program of care. The rehab physician communicates between medical physicians, therapists, and nurses to maximize the patient's potential outcome. Specific areas in which the  rehab physician will be providing daily assessment include the following:   A. Assessing the patient's heart rate and blood pressure response (vitals monitoring) to activity and making adjustments in medications or conservative measures as needed.   B. The rehab physician will be assessing the frequency at which the program can be increased to allow the patient to reach optimal functional outcome.   C. The rehab physician will also provide assessments in daily skin care, especially in light of patient's impairments in mobility.   D. The rehab physician will provide special expertise in understanding how to work with functional impairment and recommend appropriate interventions, compensatory techniques, and education that will facilitate the patient's outcome.   4. Rehab R.N.   The rehab RN will be working with patient to carry over in room mobility and activities of daily living when the patient is not in 3 hours of skilled therapy. Rehab nursing will be working in conjunction with rehab physician to address all the medical issues above and continue to assess laboratory work and discuss abnormalities with the treating physicians, assess vitals, and response to activity, and discuss and report abnormalities with the rehab physician. Rehab RN will also continue daily skin care, supervise bladder/bowel program, instruct in medication administration, and ensure patient safety.   5. Rehab Therapy: Therapies to treat at intensity and frequency of (may change after completion of evaluation by all therapeutic disciplines):       PT:  Physical therapy to address mobility, transfer, gait training and evaluation for adaptive equipment needs 1 and 1/2 hour/day at least 5 days/week for the duration of the ELOS (see below)       OT:  Occupational therapy to address ADLs, self-care, home management training, functional mobility/transfers and assistive device evaluation, and community re-integration 1 and 1/2 hour/day at least 5  days/week for the duration of the ELOS (see below).     Medical management / Rehabilitation Issues/ Adverse Potential as part of rehabilitation plan     Rehabilitation Issues/Adverse Potential  1. Left hip fracture - Patient with fall on 4/13/23 with severe hip pain found to have fracture s/p IMN with Dr. Isidro on 4/14/23. Patient is WBAT.  Patient demonstrates functional deficits in strength, balance, coordination, and ADL's. Patient is admitted to [facility] for comprehensive rehabilitation therapy as described below.   Rehabilitation nursing monitors bowel and bladder control, educates on medication administration, co-morbidities and monitors patient safety.    2.  Neurostimulants: None at this time but continue to assess daily for need to initiate should status change.    3.  DVT prophylaxis:  Patient is on Lovenox for anticoagulation upon transfer. Encourage OOB. Monitor daily for signs and symptoms of DVT including but not limited to swelling and pain to prevent the development of DVT that may interfere with therapies.    4.  GI prophylaxis:  On prilosec to prevent gastritis/dyspepsia which may interfere with therapies.    5.  Pain: No issues with pain currently / Controlled with APAP/Oxycodone    6.  Nutrition/Dysphagia: Dietician monitors nutrient intake, recommend supplements prn and provide nutrition education to pt/family to promote optimal nutrition for wound healing/recovery.     7.  Bladder/bowel:  Start bowel and bladder program as described below, to prevent constipation, urinary retention (which may lead to UTI), and urinary incontinence (which will impact upon pt's functional independence).   - Post void bladder scans, I&O cath for PVRs >400  - up to commode after meal     8.  Skin/dermal ulcer prophylaxis: Monitor for new skin conditions with q.2 h. turns as required to prevent the development of skin breakdown.     9.  Cognition/Behavior: As needed psychologist provides  adjustment counseling to illness and psychosocial barriers that may be potential barriers to rehabilitation.     10. Respiratory therapy: RT performs O2 management prn, breathing retraining, pulmonary hygiene and bronchospasm management prn to optimize participation in therapies.     Medical Co-Morbidities/Adverse Potential Affecting Function:  Left hip fracture - Patient with fall on 4/13/23 with severe hip pain found to have fracture s/p IMN with Dr. Isidro on 4/14/23. Patient is WBAT.  -PT and OT for mobility and ADLs. Per guidelines, 15 hours per week between PT, OT and/or SLP.  -Follow-up Dr. Isidro.     Anemia - Check AM CBC    Leukocytosis - Check AM CBC. Most likely reactionary    Azotemia - Check AM CMP    Hypocalcemia - Check AM CMP    Hypophosphatemia - Check AM Mag/Phosph    Rhabdomyolysis - Check AM CPK    Pain - APAP/Oxycodone PRN and Celebrex BID    Skin - Patient at risk for skin breakdown due to debility in areas including sacrum, achilles, elbows and head in addition to other sites. Nursing to assess skin daily.     GI Ppx - Patient on Prilosec for GERD prophylaxis. Patient on Senna-docusate for constipation prophylaxis.     DVT Ppx - Patient Lovenox on transfer.    I personally performed a complete drug regimen review and no potential clinically significant medication issues were identified.     Goals/Expected Level of Function Based on Current Medical and Functional Status:  (may change based on patient's medical status and rate of impairment recovery)  Transfers:   Modified Independent  Mobility/Gait:   Modified Independent  ADL's:   Modified Independent    DISPOSITION: Discharge to pre-morbid independent living setting with the supportive care of community resources.    ELOS: 10-14 days  ____________________________________    T. Rolando Burton MD/PhD  [facility] - Physical Medicine & Rehabilitation   [facility] - Brain Injury Medicine   ____________________________________    Pt was seen today for 75  min, and entire time spent in face-to-face contact was >50% in counseling and coordination of care as detailed in A/P above.

## 2023-04-17 NOTE — PREADMISSION SCREENING NOTE
Pre-Admission Screening Form    Patient Information:   Name: Lorena No     MRN: [id number]       : 1950      Age: 73 y.o.   Gender: female      Race: White [7]       Marital Status:  [5]  Family Contact: Nichol No Rochelle        Relationship: Daughter [2]  Daughter [2]  Home Phone:              Cell Phone: [phone]  Advanced Directives: None  Code Status:  FULL  Current Attending Provider: Catrachita Cason M.D.  Referring Physician: Dr. Cason      Referral Date: 23  Primary Payor Source:  MEDICARE  Secondary Payor Source:      Medical Information:   Date of Admission to Acute Care [facility]  Room Number: T337/01  Rehabilitation Diagnosis: 0008.11 - Orthopaedic Disorders: Status Post Unilateral Hip Fracture    There is no immunization history on file for this patient.  No Known Allergies  History reviewed. No pertinent past medical history.  Past Surgical History:   Procedure Laterality Date    PB OPEN FIX INTER/SUBTROCH FX,IMPLNT Left 4/15/2023    Procedure: INSERTION, INTRAMEDULLARY CONSTANCE, FEMUR, PROXIMAL;  Surgeon: Elvin Isidro M.D.;  Location: SURGERY [facility];  Service: Orthopedics       History Leading to Admission, Conditions that Caused the Need for Rehab (CMS):     Hospital Medicine History & Physical Note     Date of Service  2023     Primary Care Physician  No primary care provider on file.     Consultants  Orthopedic (Dr. Isidro)     Code Status  Full Code     Chief Complaint       Chief Complaint   Patient presents with    GLF       Late last night approx 01:00am pt fell in bedroom after walking to the bathroom, pt was not able to get up and was eventually found by her friend at 16:00 today, downtime approx 15 hours, pt denies head strike or LOC, pt c/o L hip pain    Hip Pain       L hip s/p GLF last night, +CMS          History of Presenting Illness  Lorena No is a 73 y.o. female with history alcohol dependence who presented  4/14/2023 with evaluation for ground-level fall.  Patient reported having mechanical fall as she was walking to the bathroom yesterday, noted to have fallen on her left hip.  She was unable to get back, found by friend approximately 4 PM today.  EMS was called and brought to ER for evaluation.  Noted to have left hip fracture on x-ray.  Orthopedic has been consulted, tentative plan for OR in AM.  Admitted to medicine service for further evaluation and treatment.     I discussed the plan of care with patient and bedside RN.     Review of Systems  Review of Systems   Constitutional:  Negative for chills and fever.   HENT:  Negative for hearing loss and tinnitus.    Eyes:  Negative for blurred vision and double vision.   Respiratory:  Negative for cough and shortness of breath.    Cardiovascular:  Positive for palpitations. Negative for chest pain.   Gastrointestinal:  Negative for abdominal pain, heartburn, nausea and vomiting.   Genitourinary:  Negative for dysuria and hematuria.   Musculoskeletal:  Positive for falls, joint pain and myalgias.   Skin:  Negative for itching and rash.   Neurological:  Positive for weakness. Negative for dizziness and headaches.   Endo/Heme/Allergies:  Negative for environmental allergies. Does not bruise/bleed easily.   Psychiatric/Behavioral:  Negative for depression and substance abuse.    All other systems reviewed and are negative.     Past Medical History   has no past medical history on file.  Alcohol dependence     Surgical History   has no past surgical history on file.   Prior C-sections     Family History  family history is not on file.   Family history reviewed with patient. There is no family history that is pertinent to the chief complaint.      Social History   Former tobacco smoker  Admits to drinking 3 to 4 glasses of wine daily  Denies illicit drug use  Lives alone independently at home     Allergies  No Known Allergies     Medications  None         Physical  Exam  Temp:  [36.9 °C (98.4 °F)] 36.9 °C (98.4 °F)  Pulse:  [108] 108  Resp:  [18] 18  BP: (166-178)/(64-88) 178/88  SpO2:  [95 %-97 %] 95 %  Blood Pressure : (!) 178/88   Temperature: 36.9 °C (98.4 °F)   Pulse: (!) 108   Respiration: 18   Pulse Oximetry: 95 %         Physical Exam  Vitals and nursing note reviewed.   Constitutional:       General: She is not in acute distress.  HENT:      Head: Normocephalic and atraumatic.      Nose: Nose normal.      Mouth/Throat:      Mouth: Mucous membranes are dry.      Pharynx: Oropharynx is clear.   Eyes:      General: No scleral icterus.     Extraocular Movements: Extraocular movements intact.   Cardiovascular:      Rate and Rhythm: Regular rhythm. Tachycardia present.      Pulses: Normal pulses.      Heart sounds:     No friction rub.   Pulmonary:      Effort: No respiratory distress.      Breath sounds: No wheezing or rales.   Chest:      Chest wall: No tenderness.   Abdominal:      General: There is no distension.      Tenderness: There is no abdominal tenderness. There is no guarding or rebound.   Musculoskeletal:         General: Tenderness (left hip) and signs of injury present.      Cervical back: Neck supple. No tenderness.      Right lower leg: No edema.      Left lower leg: No edema.      Comments: Decreased ROM at LLE   Skin:     General: Skin is warm and dry.      Capillary Refill: Capillary refill takes less than 2 seconds.   Neurological:      General: No focal deficit present.      Mental Status: She is alert and oriented to person, place, and time.   Psychiatric:         Mood and Affect: Mood normal.         Laboratory:      Recent Labs     04/14/23 1934   WBC 19.5*   RBC 3.81*   HEMOGLOBIN 11.7*   HEMATOCRIT 34.9*   MCV 91.6   MCH 30.7   MCHC 33.5*   RDW 42.4   PLATELETCT 312   MPV 10.0          Recent Labs     04/14/23 1934   SODIUM 137   POTASSIUM 4.0   CHLORIDE 100   CO2 21   GLUCOSE 157*   BUN 25*   CREATININE 0.64   CALCIUM 9.5          Recent Labs      04/14/23 1934   ALTSGPT 25   ASTSGOT 45   ALKPHOSPHAT 65   TBILIRUBIN 0.8   GLUCOSE 157*              Recent Labs     04/14/23 1934   NTPROBNP 224*              Recent Labs     04/14/23 1934   TROPONINT 12         Imaging:  DX-FEMUR-2+ LEFT   Final Result               Acute comminuted intertrochanteric fracture of the left femur.       DX-PELVIS-1 OR 2 VIEWS   Final Result           Acute comminuted intertrochanteric fracture of the left femur.       DX-CHEST-LIMITED (1 VIEW)   Final Result           No acute cardiopulmonary abnormalities are identified.             X-Ray:  I have personally reviewed the images and compared with prior images.  EKG:  I have personally reviewed the images and compared with prior images.     Assessment/Plan:  Justification for Admission Status  I anticipate this patient will require at least 2 midnights hospitalization, therefore appropriate for inpatient status.           * Hip fracture requiring operative repair, left, closed, initial encounter (HCC)- (present on admission)  Assessment & Plan  S/p GLF  Acute comminuted intertrochanteric fracture of left femur noted on x-ray  Fall precautions  Supportive pain control  PT/OT after the OR  Orthopedic follow-up appreciated -- planned for OR in AM     Traumatic rhabdomyolysis (HCC)  Assessment & Plan  S/p fall  CPK 1049  IVF     Hyperglycemia  Assessment & Plan  Likely reactive     Alcohol dependence (HCC)  Assessment & Plan  Multivitamins  Monitor for CIWA     Leukocytosis  Assessment & Plan  Likely reactive  No focal consolidation seen on CXR  Check UA     Hypertensive urgency  Assessment & Plan  Does not take any meds at home  Likely secondary to uncontrolled pain  Supportive pain control  As needed IV hydralazine and IV labetalol     Fall  Assessment & Plan  Fall precautions  PT/OT     ACP (advance care planning)  Assessment & Plan  Discussed in ED. Patient is agreeable for CPR/defibrillation/intubation or mechanical  ventilation.  She is also agreeable for anticipated surgery for repair hip, as well as other necessary invasive and noninvasive medical management. FULL code status confirmed.  ACP: 17mins           VTE prophylaxis: enoxaparin ppx      DATE OF SERVICE:  04/14/2023      ORTHOPEDIC CONSULTATION     REQUESTING PHYSICIAN:  Raffaele Brown MD, emergency department.     REASON FOR CONSULTATION:  Left proximal femur fracture.     CHIEF COMPLAINT:  Left hip pain.     HISTORY OF PRESENT ILLNESS:  The patient is 73 years old.  She fell yesterday   morning after walking to the bathroom.  She was down on the ground for about   15 hours or so until her friend found her.  She presented to [facility], was found   to have a left displaced proximal femur fracture.  She denies pain in this   hip prior to her fall.  She denies other injuries and she denies numbness or   paresthesias to the left lower extremity.     PAST MEDICAL HISTORY:  ALLERGIES:  No known drug allergies.  OUTPATIENT MEDICATIONS:  None listed in Epic.  PAST MEDICAL DIAGNOSES:  She denies having significant medical problems.  PAST SURGICAL HISTORY:  She has had 3 C-sections.     SOCIAL HISTORY:  She does drink alcohol occasionally.  Denies illicit drug   use.  Nonsmoker.     PHYSICAL EXAMINATION:  VITAL SIGNS:  Temperature 98.4, heart rate 108, respiratory rate 18, blood   pressure 178/____, pulse oximetry 95% on room air.  GENERAL APPEARANCE:  The patient is alert and oriented, pleasant, cooperative,   in no acute distress.  MUSCULOSKELETAL:  Left lower extremity is externally rotated and shortened.    She is nontender to palpation of the knee.  There is no obvious knee effusion   present.  She is able to dorsi and plantarflex the foot and flex and extend   the toes.  There is no evidence of obvious traumatic deformity to the right   lower or bilateral upper extremities, which are grossly neurovascularly   intact.     DIAGNOSTIC IMAGING:  Plain x-rays, AP pelvis  and 2 views of the left femur   show a left comminuted displaced intertrochanteric femur fracture.     ASSESSMENT:  A 73-year-old female who has a left displaced intertrochanteric   femur fracture with comminution.  She is being evaluated for admission to the   hospitalist service.     RECOMMENDATIONS:  1.  I discussed these findings with the patient and I do feel that she would   benefit from surgical reduction and fixation for her proximal femur fracture.    We discussed briefly options for nonoperative management, which she did not   feel is the most appropriate treatment course.  We discussed potential risks   of surgery such as infection, loss of fixation, potential for injury to   neurovascular structures, potential for fixation failure requiring further   surgery as well as general risks of anesthesia.  She expressed understanding   and wished to proceed with surgery when possible.  2.  She should be bed rest and I recommend she can be n.p.o. after midnight   and make preparations to go to the operating room, hopefully tomorrow morning   for surgical fixation.           ______________________________  MD SAMEERA Duffy/MEL/IVANNA     DD:  04/14/2023 20:30  DT:  04/14/2023 22:20     Job#:  [id number]       DATE OF SERVICE:  04/15/2023      PREOPERATIVE DIAGNOSIS:  Left comminuted displaced intertrochanteric femur   fracture.     POSTOPERATIVE DIAGNOSIS:  Left comminuted displaced intertrochanteric femur   fracture.     PROCEDURE PERFORMED:  Open treatment with intramedullary nailing, left   intertrochanteric femur fracture.     SURGEON:  Elvin Isidro MD     ANESTHESIOLOGIST:  Henry Coronel MD     ANESTHESIA:  General.     ESTIMATED BLOOD LOSS:  150 mL     IMPLANTS:  Elizabeth Gamma4 cephalomedullary nail with measuring 10x380 mm, 125   degrees with a 90 mm lag screw and a single 5.0 mm advanced interlocking   screw.     INDICATIONS FOR PROCEDURE:  This patient is 73 years old.  She had a fall,    sustained a left comminuted displaced proximal femur fracture.  She was   admitted to the hospitalist service, felt to be medically optimized for   surgical management.  I discussed treatment options with her and recommended   surgical reduction and fixation with intramedullary nailing.  She signed   informed consent preoperatively and wished to proceed with surgery as outlined   above.     DESCRIPTION OF PROCEDURE:  The patient was met in the preoperative holding   area.  Her surgical site was signed.  Her consent was confirmed to be   accurate.  She was taken back to the operating room and general anesthesia was   induced.  She was positioned on the fracture table in supine position.    Traction, internal rotation was applied to the left lower extremity across the   perineal post.  Right lower extremity was extended without traction applied.    There was significant comminution and displacement of the proximal femur, but   traction did improve with the overall alignment and fine tuning the rotation   was able to achieve what I felt was acceptable alignment with traction alone.    The left thigh was provisionally cleansed with isopropyl alcohol and then   prepped and draped in the usual sterile fashion.  A formal timeout was   performed to confirm patient's correct name, correct surgical site, correct   procedure and correct laterality.  Percutaneous starting point at the tip of   the greater trochanter was obtained with guide pin, which was then advanced   into the proximal femur.  I incised the skin over the guide pin, used the   entry reamer to enter the proximal femur.  I placed a bent on a ball-tipped   guide marina, inserted it down to the distal femur. I confirmed fluoroscopically   that the guide pin was well positioned distally.  The overall fracture   alignment was maintained.  I elected a long intramedullary nail just given the   fracture pattern, was quite atypical and relatively transverse with    significant comminution at minimize the risk of loss of fixation.  I inserted   the nail over the guide marina and inserted a guide pin for the cephalomedullary   screw and what I felt was in acceptable position to achieve an appropriate tip   to apex distance.  I then prepared for inserted a 90 mm lag screw and then   using the insertion instrumentation, medialized the nail.  I then placed one   lateral to medial interlocking screw distally using perfect Morongo technique.    I removed all insertion instrumentation and final fluoroscopic imaging   confirmed overall acceptable alignment of the fracture and acceptable position   of the implants.  The wounds were thoroughly irrigated with normal saline.  I   repaired the deep fascial layers with 0 Vicryl, subcutaneous layers with 0   Vicryl and skin edges with staples.  Wounds were thoroughly cleansed, dried   and sterile dressings were applied.  She was then taken out of traction,   transferred on the gurney, woken up from anesthesia and taken to   postanesthesia care unit in stable condition.     PLAN:  1.  The patient will be readmitted postoperatively to the hospitalist service.  2.  She can weightbear as tolerated to left lower extremity.  3.  She will need Ancef 2 doses postop for infection prophylaxis.  4.  She should work with physical and occupational therapy as soon as possible   for mobilization.  5.  She can be started on DVT chemoprophylaxis tomorrow if otherwise   clinically appropriate and should continue SCDs in the meantime.  6.  Ultimately, she will need to follow up with me 2 weeks postop for routine   wound check, staple removal and x-rays, two views of the left femur and AP   pelvis.           ______________________________  MD SAMEERA Duffy/YUDI     DD:  04/15/2023 09:25  DT:  04/15/2023 10:16     Job#:  [id number]        Immediate Post OP Note     PreOp Diagnosis: Left IT femur fracture        PostOp Diagnosis: same       "  Procedure(s):  INSERTION, INTRAMEDULLARY CONSTANCE, FEMUR, PROXIMAL - Wound Class: Clean     Surgeon(s):  Elvin Isidro M.D.     Anesthesiologist/Type of Anesthesia:  Anesthesiologist: Henry Coronel M.D./General     Surgical Staff:  Circulator: Tegan Ramirez R.N.  Scrub Person: John Blanc  Radiology Technologist: Annie Borrero     Specimens removed if any:  * No specimens in log *     Estimated Blood Loss: 150cc     Findings: see dictation     Complications: none known     PLAN:  --readmit postop  --WBAT LLE  --ancef x 2 doses postop  --PT/OT for mobilization ASAP  --okay to start DVT chemoprophylaxis tomorrow if otherwise clinically appropriate, continue SCDs  --fu 2 weeks postop           4/15/2023 9:19 AM Elvin Isidro M.D.      Co-morbidities:  See PMH  Potential Risk - Complications: Contractures, Deep Vein Thrombosis, Pain, Pneumonia, and Pressure Ulcer  Level of Risk: High    Ongoing Medical Management Needed (Medical/Nursing Needs):   Patient Active Problem List    Diagnosis Date Noted    Hip fracture requiring operative repair, left, closed, initial encounter ([facility]) 04/14/2023    Fall 04/14/2023    Hypertensive urgency 04/14/2023    Traumatic rhabdomyolysis ([facility]) 04/14/2023    Leukocytosis 04/14/2023    Alcohol dependence ([facility]) 04/14/2023    ACP (advance care planning) 04/14/2023    Hyperglycemia 04/14/2023     A/o  Current Vital Signs:   Temperature: 36.7 °C (98 °F) Pulse: 73 Respiration: 15 Blood Pressure : 121/56  Weight: 52.2 kg (115 lb) Height: 157.5 cm (5' 2\")  Pulse Oximetry: 96 % O2 (LPM): 0      Completed Laboratory Reports:  Recent Labs     04/14/23  1934 04/15/23  0308 04/16/23  1401 04/17/23  0713   WBC 19.5* 12.8* 11.3* 7.2   HEMOGLOBIN 11.7* 9.4* 7.7* 7.2*   HEMATOCRIT 34.9* 27.8* 23.7* 21.4*   PLATELETCT 312 208 197 191   SODIUM 137 137 139 140   POTASSIUM 4.0 3.7 3.9 3.9   BUN 25* 24* 13 9   CREATININE 0.64 0.59 0.53 0.47*   ALBUMIN 4.4 3.4 3.2 2.9*   GLUCOSE 157* 99 120* " 102*     Additional Labs: Not Applicable    Prior Living Situation:   Housing / Facility: Mobile Home  Steps Into Home: 4  Lives with - Patient's Self Care Capacity: Alone and Able to Care For Self  Equipment Owned: Single Point Cane    Prior Level of Function / Living Situation:   Physical Therapy: Prior Services: None  Housing / Facility: Mobile Home  Steps Into Home: 4  Rail: Both Rail (Steps into Home)  Bathroom Set up: Walk In Shower, Shower Chair, Grab Bars  Equipment Owned: Single Point Cane  Lives with - Patient's Self Care Capacity: Alone and Able to Care For Self  Bed Mobility: Independent  Transfer Status: Independent  Ambulation: Independent  Stairs: Independent  Current Level of Function:   Gait Level Of Assist: Minimal Assist  Assistive Device: Front Wheel Walker  Distance (Feet): 50  Deviation: Antalgic, Step To, Decreased Base Of Support, Bradykinetic, Shuffled Gait, Decreased Heel Strike, Decreased Toe Off  Weight Bearing Status: WBAT L LE  Supine to Sit: Minimal Assist  Sit to Supine: Minimal Assist  Scooting: Minimal Assist  Comments: HOB partially elevated for supine to sit  Sit to Stand: Minimal Assist  Bed, Chair, Wheelchair Transfer: Minimal Assist  Transfer Method: Stand Step  Sitting Edge of Bed: 6 min  Standin min  Occupational Therapy:      Prior Services: None  Housing / Facility: Mobile Home  Current Level of Function:      Speech Language Pathology:      Rehabilitation Prognosis/Potential: Good  Estimated Length of Stay: 10-14 days    Nursing:      [facility]    Scope/Intensity of Services Recommended:  Physical Therapy: 1.5 hr / day  5 days / week. Therapeutic Interventions Required: Maximize Endurance, Mobility, Strength, and Safety  Occupational Therapy: 1.5 hr / day 5 days / week. Therapeutic Interventions Required: Maximize Self Care, ADLs, IADLs, and Energy Conservation  Rehabilitation Nursin/7. Therapeutic Interventions Required: Monitor Pain, Skin, Vital Signs, Intake  and Output, Labs, Safety, and Family Training  Rehabilitation Physician: 3 - 5 days / week. Therapeutic Interventions Required: Medical Management    She requires 24-hour rehabilitation nursing to manage bowel and bladder function, skin care, surgical incision, nutrition and fluid intake, pain control, safety, medication management, and patient/family goals. In addition, rehabilitation nursing will reiterate and reinforce therapy skills and equipment use, including ADLs, as well as provide education to the patient and family. Lorena No is willing to participate in and is able to tolerate the proposed plan of care.    Rehabilitation Goals and Plan (Expected frequency & duration of treatment in the IRF):   Return to the Community, Modified Independent Level of Care, and Outpatient Support  Anticipated Date of Rehabilitation Admission: 4/17/23  Patient/Family oriented IRF level of care/facility/plan: Yes  Patient/Family willing to participate in IRF care/facility/plan: Yes  Patient able to tolerate IRF level of care proposed: Yes  Patient has potential to benefit IRF level of care proposed: Yes  Comments: Not Applicable    Special Needs or Precautions - Medical Necessity:  Safety Concerns/Precautions:  Fall Risk / High Risk for Falls and Balance  Pain Management  IV Site: Peripheral  Current Medications:    Current Facility-Administered Medications Ordered in Epic   Medication Dose Route Frequency Provider Last Rate Last Admin    senna-docusate (PERICOLACE or SENOKOT S) 8.6-50 MG per tablet 2 Tablet  2 Tablet Oral BID Carlos Enrique Vegas M.D.   2 Tablet at 04/17/23 0523    And    polyethylene glycol/lytes (MIRALAX) PACKET 1 Packet  1 Packet Oral QDAY PRN Carlos Enrique Vegas M.D.        And    magnesium hydroxide (MILK OF MAGNESIA) suspension 30 mL  30 mL Oral QDAY PRN Carlos Enrique Vegas M.D.        And    bisacodyl (DULCOLAX) suppository 10 mg  10 mg Rectal QDAY PRN Carlos Enrique Vegas M.D.        lactated ringers infusion (BOLUS)  500  mL Intravenous Once PRN Carlos Enrique Vegas M.D.        enoxaparin (Lovenox) inj 40 mg  40 mg Subcutaneous DAILY AT 1800 Carlos Enrique Vegas M.D.   40 mg at 04/16/23 1754    labetalol (NORMODYNE/TRANDATE) injection 10 mg  10 mg Intravenous Q4HRS PRN Carlos Enrique Vegas M.D.        ondansetron (ZOFRAN) syringe/vial injection 4 mg  4 mg Intravenous Q4HRS PRN Carlos Enrique Vegas M.D.        ondansetron (ZOFRAN ODT) dispertab 4 mg  4 mg Oral Q4HRS PRN Carlos Enrique Vegas M.D.        guaiFENesin dextromethorphan (ROBITUSSIN DM) 100-10 MG/5ML syrup 10 mL  10 mL Oral Q6HRS PRN Carlos Enrique Vegas M.D.        Pharmacy Consult Request ...Pain Management Review 1 Each  1 Each Other PHARMACY TO DOSE Carlos Enrique Vegas M.D.        acetaminophen (Tylenol) tablet 650 mg  650 mg Oral Q6HRS Carlos Enrique Vegas M.D.   650 mg at 04/17/23 0522    Followed by    [START ON 4/20/2023] acetaminophen (Tylenol) tablet 650 mg  650 mg Oral Q6HRS PRN Carlos Enrique Vegas M.D.        celecoxib (CELEBREX) capsule 200 mg  200 mg Oral BID Carlos Enrique Vegas M.D.   200 mg at 04/17/23 0523    Followed by    [START ON 4/19/2023] celecoxib (CELEBREX) capsule 200 mg  200 mg Oral BID PRN Carlos Enrique Vegas M.D.        oxyCODONE immediate-release (ROXICODONE) tablet 5 mg  5 mg Oral Q3HRS PRN Carlos Enrique Vegas M.D.   5 mg at 04/16/23 0816    Or    oxyCODONE immediate release (ROXICODONE) tablet 10 mg  10 mg Oral Q3HRS PRN Carlos Enrique Vegas M.D.        Or    HYDROmorphone (Dilaudid) injection 0.5 mg  0.5 mg Intravenous Q3HRS PRN Carlos Enrique Vegas M.D.        lidocaine (LIDODERM) 5 % 1 Patch  1 Patch Transdermal Q24HR Carlos Enrique Vegas M.D.   1 Patch at 04/16/23 2042    NS infusion 2,000 mL  2,000 mL Intravenous Continuous Carlos Enrique Vegas M.D. 125 mL/hr at 04/14/23 2257 2,000 mL at 04/14/23 2257    hydrALAZINE (APRESOLINE) injection 10 mg  10 mg Intravenous Q4HRS PRN Carlos Enrique Vegas M.D.        LORazepam (ATIVAN) tablet 0.5 mg  0.5 mg Oral Q4HRS PRN Carlos Enrique Vegas M.D.        LORazepam (ATIVAN) tablet 1 mg  1 mg Oral Q4HRS PRN Carlos Enrique Vegas,  M.D.        Or    LORazepam (ATIVAN) injection 0.5 mg  0.5 mg Intravenous Q4HRS PRN Carlos Enrique Vegas M.D.        LORazepam (ATIVAN) tablet 2 mg  2 mg Oral Q2HRS PRN Carlos Enrique Vegas M.D.        Or    LORazepam (ATIVAN) injection 1 mg  1 mg Intravenous Q2HRS PRN Carlos Enrique Vegas M.D.        LORazepam (ATIVAN) tablet 3 mg  3 mg Oral Q HOUR PRN Carlos Enrique Vegas M.D.        Or    LORazepam (ATIVAN) injection 1.5 mg  1.5 mg Intravenous Q HOUR PRN Carlos Enrique Vegas M.D.        LORazepam (ATIVAN) tablet 4 mg  4 mg Oral Q15 MIN PRN Carlos Enrique Vegas M.D.        Or    LORazepam (ATIVAN) injection 2 mg  2 mg Intravenous Q15 MIN PRN Carlos Enrique Vegas M.D.        thiamine (Vitamin B-1) tablet 100 mg  100 mg Oral DAILY Carlos Enrique Vegas M.D.   100 mg at 04/17/23 0524    And    multivitamin tablet 1 Tablet  1 Tablet Oral DAILY Carlos Enrique Vegas M.D.   1 Tablet at 04/17/23 0524    And    folic acid (FOLVITE) tablet 1 mg  1 mg Oral DAILY Carlos Enrique Vegas M.D.   1 mg at 04/17/23 0524     No current [facility]-ordered outpatient medications on file.     Diet:   DIET ORDERS (From admission to next 24h)       Start     Ordered    04/16/23 0520  Diet Order Diet: Level 6 - Soft and Bite Sized; Liquid level: Level 0 - Thin  ALL MEALS        Question Answer Comment   Diet: Level 6 - Soft and Bite Sized    Liquid level Level 0 - Thin        04/16/23 0523                    Anticipated Discharge Destination / Patient/Family Goal:  Destination: Home Alone Support System: Family   Anticipated home health services: OT, PT, Nursing, Social Work, and Aide  Previously used HH service/ provider: Not Applicable  Anticipated DME Needs: Walker  Outpatient Services: OT and PT  Alternative resources to address additional identified needs:   No future appointments.   Pre-Screen Completed: 4/17/2023 11:42 AM Clary Childs

## 2023-04-17 NOTE — CARE PLAN
The patient is Stable - Low risk of patient condition declining or worsening    Shift Goals  Clinical Goals: mobility; pain control  Patient Goals: comfort; mobility  Family Goals: not present    Progress made toward(s) clinical / shift goals:    Problem: Pain - Standard  Goal: Alleviation of pain or a reduction in pain to the patient’s comfort goal  Outcome: Progressing     Problem: Knowledge Deficit - Standard  Goal: Patient and family/care givers will demonstrate understanding of plan of care, disease process/condition, diagnostic tests and medications  Outcome: Progressing     Problem: Risk for Aspiration  Goal: Patient's risk for aspiration will be absent or decrease  Outcome: Progressing     Problem: Skin Integrity  Goal: Skin integrity is maintained or improved  Outcome: Progressing     Problem: Fall Risk  Goal: Patient will remain free from falls  Outcome: Progressing       Patient is not progressing towards the following goals:

## 2023-04-17 NOTE — THERAPY
Occupational Therapy   Initial Evaluation     Patient Name: Lorena No  Age:  73 y.o., Sex:  female  Medical Record #: [id number]  Today's Date: 4/17/2023     Precautions: (P) Fall Risk, Weight Bearing As Tolerated Left Lower Extremity    Assessment  Patient is 73 y.o. female admitted after GLF walking to the bathroom found to have hip fx seen s/p IMN L femur fx for OT evaluation. Pt able to demonstrate full body dressing (including underpants and pants), toileting, toilet transfers, and standing ADLs SPV level. Pt reports her dtr and friends can assist with IADLs (I.e. shopping, driving, etc.) as needed. Pt reporting she feels confident with DC home. Will sign off at this time.     Patient will not be actively followed for occupational therapy services at this time, however may be seen if requested by physician for 1 more visit within 30 days to address any discharge or equipment needs.     Plan    Occupational Therapy Initial Treatment Plan   Duration: (P) Discharge Needs Only    DC Equipment Recommendations: (P) None  Discharge Recommendations: (P) Recommend home health for continued occupational therapy services      04/17/23 1159   Prior Living Situation   Prior Services None   Housing / Facility Mobile Home   Bathroom Set up Walk In Shower;Shower Chair;Grab Bars   Equipment Owned Single Point Cane;Tub / Shower Seat;Grab Bar(s) In Tub / Shower;Raised Toilet Seat Without Arms   Lives with - Patient's Self Care Capacity Alone and Able to Care For Self   Comments Pt reports her neighbor can assist and dtrs can assist   Prior Level of ADL Function   Self Feeding Independent   Grooming / Hygiene Independent   Bathing Independent   Dressing Independent   Toileting Independent   Prior Level of IADL Function   Medication Management Independent   Laundry Independent   Kitchen Mobility Independent   Finances Independent   Home Management Independent   Shopping Independent   History of Falls   History of Falls Yes   Date  of Last Fall   (reason for admit)   Precautions   Precautions Fall Risk;Weight Bearing As Tolerated Left Lower Extremity   Cognition    Cognition / Consciousness WDL   Comments very pleasant and receptive   Active ROM Upper Body   Active ROM Upper Body  WDL   Strength Upper Body   Upper Body Strength  WDL   Comments reports L weakness at baseline compared to 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   Putting on and taking off regular lower body clothing? 4   Bathing (including washing, rinsing, and drying)? 4   Toileting, which includes using a toilet, bedpan, or urinal? 4   Putting on and taking off regular upper body clothing? 4   Taking care of personal grooming such as brushing teeth? 4   Eating meals? 4   6 Clicks Daily Activity Score 24   Functional Mobility   Sit to Stand Supervised   Bed, Chair, Wheelchair Transfer Supervised   Toilet Transfers Supervised  (from high toilet (has one at home))   Patient / Family Goals   Patient / Family Goal #1 to go home   Education Group   Education Provided Activities of Daily Living   Role of Occupational Therapist Patient Response Patient;Acceptance;Explanation   ADL Patient Response Patient;Acceptance;Explanation   Occupational Therapy Initial Treatment Plan    Duration Discharge Needs Only   Anticipated Discharge Equipment and Recommendations   DC Equipment Recommendations None   Discharge Recommendations Recommend home health for continued occupational therapy services

## 2023-04-17 NOTE — DISCHARGE SUMMARY
Discharge Summary    CHIEF COMPLAINT ON ADMISSION  Chief Complaint   Patient presents with    GLF     Late last night approx 01:00am pt fell in bedroom after walking to the bathroom, pt was not able to get up and was eventually found by her friend at 16:00 today, downtime approx 15 hours, pt denies head strike or LOC, pt c/o L hip pain    Hip Pain     L hip s/p GLF last night, +CMS        Reason for Admission  EMS    Admission Date  4/14/2023     CODE STATUS  Full Code    HPI & HOSPITAL COURSE  This 73-year-old female with a past medical significant medical dependence presented to the ER on 4/14/2023 after she had a ground-level fall.  Report that she was walking to the bathroom yesterday had a fall; unable to bear weight on the left side.    Imaging showed left hip fracture.  Orthopedic surgery was consulted, patient underwent Open treatment with intramedullary nailing, left intertrochanteric femur fracture. Patient can bear weight as tolerated.  Patient is ready for admission, recommend postacute.  Physiatry evaluated patient, stated patient is a good candidate for acute rehab.  At this time patient medically stable to be discharged to acute rehab.  Hospital course complicated acute blood loss anemia; today her hemoglobin is noted to be 7.2, monitor    Of note her hemoglobin noted to be 12 on the day of surgery, today hemoglobin noted to be 7.2    Of note patient phosphorus noted to be on the lower side, started on Neutra-Phos 500 GI 3 times daily for 4 doses.  Patient need to have repeat CBC tomorrow along with repeat phosphorus..    Therefore, she is discharged in good and stable condition to skilled nursing facility.    The patient met 2-midnight criteria for an inpatient stay at the time of discharge.      FOLLOW UP ITEMS POST DISCHARGE  Please follow up with pcp as an op  Please follow up with orthopedic surgery    DISCHARGE DIAGNOSES  Principal Problem:    Hip fracture requiring operative repair, left,  closed, initial encounter (HCC) POA: Yes  Active Problems:    Fall POA: Unknown    Hypertensive urgency POA: Unknown    Traumatic rhabdomyolysis (HCC) POA: Unknown    Leukocytosis POA: Unknown    Alcohol dependence (HCC) POA: Unknown    ACP (advance care planning) POA: Unknown    Hyperglycemia POA: Unknown  Resolved Problems:    Hypoglycemia POA: Unknown      FOLLOW UP  No future appointments.  No follow-up provider specified.    MEDICATIONS ON DISCHARGE     Medication List        START taking these medications        Instructions   acetaminophen 325 MG Tabs  Commonly known as: Tylenol   Take 2 Tablets by mouth every 6 hours.  Dose: 650 mg     enoxaparin 40 MG/0.4ML Sosy inj  Commonly known as: Lovenox   Inject 40 mg under the skin every day at 6 PM for 26 days.  Dose: 40 mg     folic acid 1 MG Tabs  Start taking on: April 18, 2023  Commonly known as: FOLVITE   Take 1 Tablet by mouth every day.  Dose: 1 mg     phosphorus 250 MG tablet  Commonly known as: K-Phos-Neutral   Take 1 Tablet by mouth every 6 hours for 4 doses.  Dose: 1 Tablet     polyethylene glycol/lytes 17 g Pack  Commonly known as: MIRALAX   Take 1 Packet by mouth 1 time a day as needed (if sennosides and docusate ineffective after 24 hours).  Dose: 17 g     senna-docusate 8.6-50 MG Tabs  Commonly known as: PERICOLACE or SENOKOT S   Take 2 Tablets by mouth 2 times a day.  Dose: 2 Tablet     thiamine 100 MG tablet  Start taking on: April 18, 2023  Commonly known as: THIAMINE   Take 1 Tablet by mouth every day.  Dose: 100 mg            CHANGE how you take these medications        Instructions   * multivitamin Tabs  What changed: Another medication with the same name was added. Make sure you understand how and when to take each.   Take 1 Tablet by mouth every day.  Dose: 1 Tablet     * multivitamin Tabs  Start taking on: April 18, 2023  What changed: You were already taking a medication with the same name, and this prescription was added. Make sure you  understand how and when to take each.   Take 1 Tablet by mouth every day.  Dose: 1 Tablet           * This list has 2 medication(s) that are the same as other medications prescribed for you. Read the directions carefully, and ask your doctor or other care provider to review them with you.                CONTINUE taking these medications        Instructions   CALCIUM PO   Take 1 Dose by mouth every day.  Dose: 1 Dose              Allergies  No Known Allergies    DIET  Orders Placed This Encounter   Procedures    Diet Order Diet: Level 6 - Soft and Bite Sized; Liquid level: Level 0 - Thin     Standing Status:   Standing     Number of Occurrences:   1     Order Specific Question:   Diet:     Answer:   Level 6 - Soft and Bite Sized [23]     Order Specific Question:   Liquid level     Answer:   Level 0 - Thin       ACTIVITY  As tolerated and directed by rehab.  Weight bearing as tolerated    LINES, DRAINS, AND WOUNDS  This is an automated list. Peripheral IVs will be removed prior to discharge.  Peripheral IV 04/14/23 20 G Anterior;Left;Proximal Forearm (Active)   Site Assessment Clean;Dry;Intact 04/17/23 0800   Dressing Type Transparent 04/17/23 0800   Line Status Flushed;Scrubbed the hub prior to access;Saline locked 04/17/23 0800   Dressing Status Clean;Dry;Intact 04/17/23 0800   Dressing Intervention N/A 04/17/23 0800   Dressing Change Due 04/21/23 04/14/23 2200   Date Primary Tubing Changed 04/15/23 04/15/23 0729   NEXT Primary Tubing Change  04/15/23 04/14/23 2200   Date IV Connector(s) Changed 04/14/23 04/14/23 2200   [place]fil[place] [place] 04/17/23 0800   Phlebitis Scale ([facility] Only) 0 04/17/23 0800       Wound 04/15/23 Thigh (Active)   Site Assessment BRITTANY 04/17/23 0818   Periwound Assessment BRITTANY 04/17/23 0818   Margins BRITTANY 04/17/23 0818   Closure BRITTANY 04/17/23 0818   Drainage Amount BRITTANY 04/17/23 0818   Drainage Description Serosanguineous 04/16/23 2046   Treatments Offloading 04/17/23 0818    Dressing Options Dry Gauze;Transparent Film 04/17/23 0818   Dressing Changed Observed 04/17/23 0818   Dressing Status Clean;Dry;Intact 04/17/23 0818       Peripheral IV 04/14/23 20 G Anterior;Left;Proximal Forearm (Active)   Site Assessment Clean;Dry;Intact 04/17/23 0800   Dressing Type Transparent 04/17/23 0800   Line Status Flushed;Scrubbed the hub prior to access;Saline locked 04/17/23 0800   Dressing Status Clean;Dry;Intact 04/17/23 0800   Dressing Intervention N/A 04/17/23 0800   Dressing Change Due 04/21/23 04/14/23 2200   Date Primary Tubing Changed 04/15/23 04/15/23 0729   NEXT Primary Tubing Change  04/15/23 04/14/23 2200   Date IV Connector(s) Changed 04/14/23 04/14/23 2200   Infiltration Grading ([facility]) 0 04/17/23 0800   Phlebitis Scale ([facility] Only) 0 04/17/23 0800               MENTAL STATUS ON TRANSFER      Alert and orineted x 4       CONSULTATIONS  ortho    PROCEDURES  Open treatment with intramedullary nailing, left   intertrochanteric femur fracture.    LABORATORY  Lab Results   Component Value Date    SODIUM 140 04/17/2023    POTASSIUM 3.9 04/17/2023    CHLORIDE 109 04/17/2023    CO2 24 04/17/2023    GLUCOSE 102 (H) 04/17/2023    BUN 9 04/17/2023    CREATININE 0.47 (L) 04/17/2023        Lab Results   Component Value Date    WBC 7.2 04/17/2023    HEMOGLOBIN 7.2 (L) 04/17/2023    HEMATOCRIT 21.4 (L) 04/17/2023    PLATELETCT 191 04/17/2023        Total time of the discharge process exceeds 37 minutes.

## 2023-04-17 NOTE — DISCHARGE INSTRUCTIONS
Discharge Instructions    Discharged to other by medical transportation with escort. Discharged via wheelchair, hospital escort: Yes.  Special equipment needed: Not Applicable    Be sure to schedule a follow-up appointment with your primary care doctor or any specialists as instructed.     Discharge Plan:        I understand that a diet low in cholesterol, fat, and sodium is recommended for good health. Unless I have been given specific instructions below for another diet, I accept this instruction as my diet prescription.   Other diet: n/a    Special Instructions: None    -Is this patient being discharged with medication to prevent blood clots?  No    Is patient discharged on Warfarin / Coumadin?   No   Intramedullary Nailing of Femoral Shaft Fracture, Care After  This sheet gives you information about how to care for yourself after your procedure. Your health care provider may also give you more specific instructions. If you have problems or questions, contact your health care provider.  What can I expect after the procedure?  After the procedure, it is common to have these symptoms in the surgical area:  Pain.  Swelling.  Tenderness.  Bruising.  Stiffness.  Weakness.  Follow these instructions at home:  Medicines  Take over-the-counter and prescription medicines only as told by your health care provider.  Ask your health care provider if the medicine prescribed to you:  Requires you to avoid driving or using heavy machinery.  Can cause constipation. You may need to take actions to prevent or treat constipation, such as:  Drink enough fluid to keep your urine pale yellow.  Take over-the-counter or prescription medicines.  Eat foods that are high in fiber, such as beans, whole grains, and fresh fruits and vegetables.  Limit foods that are high in fat and processed sugars, such as fried or sweet foods.  Bathing  Do not take baths, swim, or use a hot tub until your health care provider approves. Ask your health care  provider if you may take showers. You may only be allowed to take sponge baths.  Keep your bandage (dressing) dry if you shower or take a sponge bath.  Incision care    Follow instructions from your health care provider about how to take care of your incision. Make sure you:  Wash your hands with soap and water before and after you change your dressing. If soap and water are not available, use hand .  Change your dressing as told by your health care provider.  Leave stitches (sutures), skin glue, or adhesive strips in place. These skin closures may need to stay in place for 2 weeks or longer. If adhesive strip edges start to loosen and curl up, you may trim the loose edges. Do not remove adhesive strips completely unless your health care provider tells you to do that.  Check your incision area every day for signs of infection. Check for:  More redness, swelling, or pain.  Fluid or blood.  Warmth.  Pus or a bad smell.  Managing pain, stiffness, and swelling    If directed, put ice on the affected area.  Put ice in a plastic bag.  Place a towel between your skin and the bag.  Leave the ice on for 20 minutes, 2-3 times a day.  Move your toes often to reduce stiffness and swelling.  Raise (elevate) the injured area above the level of your heart while you are sitting or lying down.  Activity  Rest as told by your health care provider.  Use your crutches or walker as told by your health care provider. Your health care provider will let you know how much weight you can put on your leg. Your health care provider will do X-rays to check bone healing. As healing progresses, you may be allowed to put more weight on your leg.  Avoid sitting for a long time without moving. Get up to take short walks every 1-2 hours. This is important to improve blood flow and breathing. Ask for help if you feel weak or unsteady.  Keep all your physical therapy appointments.  Do exercises as told by your health care provider. These  exercises will prevent weakness and stiffness in the affected area.  Return to your normal activities as told by your health care provider. Ask your health care provider what activities are safe for you. It may take several months to heal completely.  Ask your health care provider when it is safe to drive.  General instructions  Do not use any products that contain nicotine or tobacco, such as cigarettes, e-cigarettes, and chewing tobacco. These can delay bone healing after surgery. If you need help quitting, ask your health care provider.  Take steps to prevent falls at home, such as removing throw rugs and tripping hazards.  Keep all follow-up visits as told by your health care provider. This is important.  Contact a health care provider if:  You are not getting relief from your pain medicine.  You have more redness, swelling, or pain around your incision.  You have fluid or blood coming from your incision.  Your incision feels warm to the touch.  You have pus or a bad smell coming from your incision.  Get help right away if:  You have a fever and chills.  You have chest pain or trouble breathing.  Your incision breaks open.  You have severe pain that does not get better with medicine.  Summary  After your surgery, it is normal to have some pain, swelling, and tenderness in the surgical area.  Take over-the-counter and prescription medicines only as told by your health care provider.  Follow instructions from your health care provider about how to take care of your incision. Check your incision area every day for signs of infection.  Return to your normal activities as told by your health care provider. Ask your health care provider what activities are safe for you.  It may take several months to heal completely. Keep all follow-up visits as told by your health care provider.  This information is not intended to replace advice given to you by your health care provider. Make sure you discuss any questions you have  with your health care provider.  Document Released: 10/21/2019 Document Revised: 10/21/2019 Document Reviewed: 10/21/2019  Elsevier Patient Education © 2020 [employer]

## 2023-04-17 NOTE — PROGRESS NOTES
Report given to MITCHELL Aranda of [facility]. Patient transport via [facility] in wheelchair. Patient left with two bags of belongings.

## 2023-04-17 NOTE — CARE PLAN
The patient is Stable - Low risk of patient condition declining or worsening    Shift Goals  Clinical Goals: mobility  Patient Goals: Rest  Family Goals: not comfort    Progress made toward(s) clinical / shift goals:    Problem: Pain - Standard  Goal: Alleviation of pain or a reduction in pain to the patient’s comfort goal  Outcome: Progressing     Problem: Knowledge Deficit - Standard  Goal: Patient and family/care givers will demonstrate understanding of plan of care, disease process/condition, diagnostic tests and medications  Outcome: Progressing     Problem: Optimal Care for Alcohol Withdrawal  Goal: Optimal Care for the alcohol withdrawal patient  Outcome: Progressing     Problem: Seizure Precautions  Goal: Implementation of seizure precautions  Outcome: Progressing     Problem: Lifestyle Changes  Goal: Patient's ability to identify lifestyle changes and available resources to help reduce recurrence of condition will improve  Outcome: Progressing     Problem: Risk for Aspiration  Goal: Patient's risk for aspiration will be absent or decrease  Outcome: Progressing     Problem: Hemodynamics  Goal: Patient's hemodynamics, fluid balance and neurologic status will be stable or improve  Outcome: Progressing     Problem: Fall Risk  Goal: Patient will remain free from falls  Outcome: Progressing       Patient is not progressing towards the following goals:

## 2023-04-17 NOTE — DISCHARGE PLANNING
[facility] Transitional [facility] Coordination    Referral from: Dr Cason  Insurance Provider on Facesheet: Medicare  Potential Rehab Diagnosis: Hip fx    Chart review indicates patient may have on going medical management and may have therapy needs to possibly meet inpatient rehab facility criteria with the goal of returning to community.    D/C support: Lives alone 4STE     Physiatry consultation forwarded per protocol.     Hip fx - per attending patient is medically cleared. Pending OT eval, physiatry to review.     Thank you for the referral.

## 2023-04-18 ENCOUNTER — APPOINTMENT (OUTPATIENT)
Dept: OCCUPATIONAL THERAPY | Facility: REHABILITATION | Age: 73
DRG: 560 | End: 2023-04-18
Attending: PHYSICAL MEDICINE & REHABILITATION
Payer: MEDICARE

## 2023-04-18 ENCOUNTER — APPOINTMENT (OUTPATIENT)
Dept: PHYSICAL THERAPY | Facility: REHABILITATION | Age: 73
DRG: 560 | End: 2023-04-18
Attending: PHYSICAL MEDICINE & REHABILITATION
Payer: MEDICARE

## 2023-04-18 PROBLEM — D62 ACUTE BLOOD LOSS ANEMIA: Status: ACTIVE | Noted: 2023-04-18

## 2023-04-18 LAB
25(OH)D3 SERPL-MCNC: 38 NG/ML (ref 30–100)
ALBUMIN SERPL BCP-MCNC: 3.1 G/DL (ref 3.2–4.9)
ALBUMIN/GLOB SERPL: 1.3 G/DL
ALP SERPL-CCNC: 51 U/L (ref 30–99)
ALT SERPL-CCNC: 18 U/L (ref 2–50)
ANION GAP SERPL CALC-SCNC: 8 MMOL/L (ref 7–16)
AST SERPL-CCNC: 45 U/L (ref 12–45)
BASOPHILS # BLD AUTO: 0.7 % (ref 0–1.8)
BASOPHILS # BLD: 0.05 K/UL (ref 0–0.12)
BILIRUB SERPL-MCNC: 0.6 MG/DL (ref 0.1–1.5)
BUN SERPL-MCNC: 10 MG/DL (ref 8–22)
CALCIUM ALBUM COR SERPL-MCNC: 9.1 MG/DL (ref 8.5–10.5)
CALCIUM SERPL-MCNC: 8.4 MG/DL (ref 8.5–10.5)
CHLORIDE SERPL-SCNC: 109 MMOL/L (ref 96–112)
CK SERPL-CCNC: 728 U/L (ref 0–154)
CO2 SERPL-SCNC: 23 MMOL/L (ref 20–33)
CREAT SERPL-MCNC: 0.51 MG/DL (ref 0.5–1.4)
EOSINOPHIL # BLD AUTO: 0.35 K/UL (ref 0–0.51)
EOSINOPHIL NFR BLD: 4.8 % (ref 0–6.9)
ERYTHROCYTE [DISTWIDTH] IN BLOOD BY AUTOMATED COUNT: 44.1 FL (ref 35.9–50)
EST. AVERAGE GLUCOSE BLD GHB EST-MCNC: 103 MG/DL
GFR SERPLBLD CREATININE-BSD FMLA CKD-EPI: 100 ML/MIN/1.73 M 2
GLOBULIN SER CALC-MCNC: 2.4 G/DL (ref 1.9–3.5)
GLUCOSE SERPL-MCNC: 94 MG/DL (ref 65–99)
HBA1C MFR BLD: 5.2 % (ref 4–5.6)
HCT VFR BLD AUTO: 20.8 % (ref 37–47)
HGB BLD-MCNC: 7 G/DL (ref 12–16)
IMM GRANULOCYTES # BLD AUTO: 0.03 K/UL (ref 0–0.11)
IMM GRANULOCYTES NFR BLD AUTO: 0.4 % (ref 0–0.9)
LYMPHOCYTES # BLD AUTO: 2.61 K/UL (ref 1–4.8)
LYMPHOCYTES NFR BLD: 35.5 % (ref 22–41)
MAGNESIUM SERPL-MCNC: 1.6 MG/DL (ref 1.5–2.5)
MCH RBC QN AUTO: 31.3 PG (ref 27–33)
MCHC RBC AUTO-ENTMCNC: 33.7 G/DL (ref 33.6–35)
MCV RBC AUTO: 92.9 FL (ref 81.4–97.8)
MONOCYTES # BLD AUTO: 0.83 K/UL (ref 0–0.85)
MONOCYTES NFR BLD AUTO: 11.3 % (ref 0–13.4)
NEUTROPHILS # BLD AUTO: 3.49 K/UL (ref 2–7.15)
NEUTROPHILS NFR BLD: 47.3 % (ref 44–72)
NRBC # BLD AUTO: 0 K/UL
NRBC BLD-RTO: 0 /100 WBC
PHOSPHATE SERPL-MCNC: 3.2 MG/DL (ref 2.5–4.5)
PLATELET # BLD AUTO: 218 K/UL (ref 164–446)
PMV BLD AUTO: 9.8 FL (ref 9–12.9)
POTASSIUM SERPL-SCNC: 4.1 MMOL/L (ref 3.6–5.5)
PROT SERPL-MCNC: 5.5 G/DL (ref 6–8.2)
RBC # BLD AUTO: 2.24 M/UL (ref 4.2–5.4)
SODIUM SERPL-SCNC: 140 MMOL/L (ref 135–145)
TSH SERPL DL<=0.005 MIU/L-ACNC: 3.85 UIU/ML (ref 0.38–5.33)
WBC # BLD AUTO: 7.4 K/UL (ref 4.8–10.8)

## 2023-04-18 PROCEDURE — 99232 SBSQ HOSP IP/OBS MODERATE 35: CPT | Performed by: PHYSICAL MEDICINE & REHABILITATION

## 2023-04-18 PROCEDURE — 84443 ASSAY THYROID STIM HORMONE: CPT

## 2023-04-18 PROCEDURE — 82550 ASSAY OF CK (CPK): CPT

## 2023-04-18 PROCEDURE — 97166 OT EVAL MOD COMPLEX 45 MIN: CPT

## 2023-04-18 PROCEDURE — 97116 GAIT TRAINING THERAPY: CPT

## 2023-04-18 PROCEDURE — 97535 SELF CARE MNGMENT TRAINING: CPT

## 2023-04-18 PROCEDURE — 700111 HCHG RX REV CODE 636 W/ 250 OVERRIDE (IP): Performed by: PHYSICAL MEDICINE & REHABILITATION

## 2023-04-18 PROCEDURE — 84100 ASSAY OF PHOSPHORUS: CPT

## 2023-04-18 PROCEDURE — 83036 HEMOGLOBIN GLYCOSYLATED A1C: CPT

## 2023-04-18 PROCEDURE — 99223 1ST HOSP IP/OBS HIGH 75: CPT | Performed by: HOSPITALIST

## 2023-04-18 PROCEDURE — 770010 HCHG ROOM/CARE - REHAB SEMI PRIVAT*

## 2023-04-18 PROCEDURE — 83735 ASSAY OF MAGNESIUM: CPT

## 2023-04-18 PROCEDURE — 36415 COLL VENOUS BLD VENIPUNCTURE: CPT

## 2023-04-18 PROCEDURE — 82270 OCCULT BLOOD FECES: CPT

## 2023-04-18 PROCEDURE — 97110 THERAPEUTIC EXERCISES: CPT

## 2023-04-18 PROCEDURE — 80053 COMPREHEN METABOLIC PANEL: CPT

## 2023-04-18 PROCEDURE — 700101 HCHG RX REV CODE 250: Performed by: PHYSICAL MEDICINE & REHABILITATION

## 2023-04-18 PROCEDURE — 82306 VITAMIN D 25 HYDROXY: CPT

## 2023-04-18 PROCEDURE — A9270 NON-COVERED ITEM OR SERVICE: HCPCS | Performed by: PHYSICAL MEDICINE & REHABILITATION

## 2023-04-18 PROCEDURE — 97530 THERAPEUTIC ACTIVITIES: CPT

## 2023-04-18 PROCEDURE — 85025 COMPLETE CBC W/AUTO DIFF WBC: CPT

## 2023-04-18 PROCEDURE — 97162 PT EVAL MOD COMPLEX 30 MIN: CPT

## 2023-04-18 PROCEDURE — 700102 HCHG RX REV CODE 250 W/ 637 OVERRIDE(OP): Performed by: PHYSICAL MEDICINE & REHABILITATION

## 2023-04-18 RX ADMIN — ENOXAPARIN SODIUM 40 MG: 40 INJECTION SUBCUTANEOUS at 17:11

## 2023-04-18 RX ADMIN — OMEPRAZOLE 20 MG: 20 CAPSULE, DELAYED RELEASE ORAL at 09:13

## 2023-04-18 RX ADMIN — ACETAMINOPHEN 650 MG: 325 TABLET ORAL at 17:11

## 2023-04-18 RX ADMIN — LIDOCAINE 1 PATCH: 50 PATCH TOPICAL at 21:24

## 2023-04-18 RX ADMIN — ACETAMINOPHEN 650 MG: 325 TABLET ORAL at 05:30

## 2023-04-18 RX ADMIN — CELECOXIB 200 MG: 100 CAPSULE ORAL at 09:12

## 2023-04-18 RX ADMIN — Medication 100 MG: at 09:13

## 2023-04-18 RX ADMIN — OXYCODONE HYDROCHLORIDE 10 MG: 10 TABLET ORAL at 05:29

## 2023-04-18 RX ADMIN — OXYCODONE HYDROCHLORIDE 10 MG: 10 TABLET ORAL at 01:42

## 2023-04-18 RX ADMIN — SENNOSIDES AND DOCUSATE SODIUM 2 TABLET: 50; 8.6 TABLET ORAL at 09:13

## 2023-04-18 RX ADMIN — ACETAMINOPHEN 650 MG: 325 TABLET ORAL at 23:38

## 2023-04-18 RX ADMIN — FOLIC ACID 1 MG: 1 TABLET ORAL at 09:13

## 2023-04-18 RX ADMIN — THERA TABS 1 TABLET: TAB at 09:13

## 2023-04-18 RX ADMIN — CELECOXIB 200 MG: 100 CAPSULE ORAL at 21:23

## 2023-04-18 RX ADMIN — ACETAMINOPHEN 650 MG: 325 TABLET ORAL at 11:34

## 2023-04-18 RX ADMIN — SENNOSIDES AND DOCUSATE SODIUM 2 TABLET: 50; 8.6 TABLET ORAL at 21:23

## 2023-04-18 SDOH — ECONOMIC STABILITY: TRANSPORTATION INSECURITY
IN THE PAST 12 MONTHS, HAS LACK OF RELIABLE TRANSPORTATION KEPT YOU FROM MEDICAL APPOINTMENTS, MEETINGS, WORK OR FROM GETTING THINGS NEEDED FOR DAILY LIVING?: NO

## 2023-04-18 SDOH — ECONOMIC STABILITY: TRANSPORTATION INSECURITY
IN THE PAST 12 MONTHS, HAS THE LACK OF TRANSPORTATION KEPT YOU FROM MEDICAL APPOINTMENTS OR FROM GETTING MEDICATIONS?: NO

## 2023-04-18 ASSESSMENT — BRIEF INTERVIEW FOR MENTAL STATUS (BIMS)
ASKED TO RECALL SOCK: YES, NO CUE REQUIRED
ASKED TO RECALL BED: NO, COULD NOT RECALL
WHAT MONTH IS IT: ACCURATE WITHIN 5 DAYS
WHAT YEAR IS IT: CORRECT
ASKED TO RECALL BLUE: YES, NO CUE REQUIRED
BIMS SUMMARY SCORE: 13
INITIAL REPETITION OF BED BLUE SOCK - FIRST ATTEMPT: 3
WHAT DAY OF THE WEEK IS IT: CORRECT

## 2023-04-18 ASSESSMENT — ACTIVITIES OF DAILY LIVING (ADL)
BED_CHAIR_WHEELCHAIR_TRANSFER_DESCRIPTION: ADAPTIVE EQUIPMENT;INCREASED TIME;NON-HOSPITAL BED;SET-UP OF EQUIPMENT;SUPERVISION FOR SAFETY;VERBAL CUEING
TOILETING: INDEPENDENT

## 2023-04-18 ASSESSMENT — GAIT ASSESSMENTS
DEVIATION: ANTALGIC;STEP TO;DECREASED BASE OF SUPPORT;BRADYKINETIC;DECREASED HEEL STRIKE
DISTANCE (FEET): 40
GAIT LEVEL OF ASSIST: CONTACT GUARD ASSIST
ASSISTIVE DEVICE: FRONT WHEEL WALKER
GAIT LEVEL OF ASSIST: CONTACT GUARD ASSIST
DISTANCE (FEET): 64
ASSISTIVE DEVICE: FRONT WHEEL WALKER
DEVIATION: ANTALGIC;STEP TO;DECREASED BASE OF SUPPORT;BRADYKINETIC;DECREASED HEEL STRIKE

## 2023-04-18 ASSESSMENT — PAIN DESCRIPTION - PAIN TYPE: TYPE: ACUTE PAIN

## 2023-04-18 NOTE — THERAPY
"Occupational Therapy   Initial Evaluation     Patient Name: Lorena No  Age:  73 y.o., Sex:  female  Medical Record #: [id number]  Today's Date: 4/18/2023     Subjective    Pt in bed upon arrival, pleasant and agreeable to OT evaluation.     Objective       04/18/23 0701   OT Charge Group   OT Self Care / ADL (Units) 1   OT Evaluation OT Evaluation Mod   OT Total Time Spent   OT Individual Total Time Spent (Mins) 60   Prior Living Situation   Prior Services Home-Independent   Housing / Facility 1 Story House;Mobile Home   Steps Into Home 4   Bathroom Set up Walk In Shower;Grab Bars;Shower Chair   Equipment Owned Tub / Shower Seat;Grab Bar(s) In Tub / Shower;Raised Toilet Seat Without Arms   Prior Level of ADL Function   Self Feeding Independent   Grooming / Hygiene Independent   Bathing Independent   Dressing Independent   Toileting Independent   Prior Level of IADL Function   Medication Management Independent   Laundry Independent   Kitchen Mobility Independent   Finances Independent   Home Management Independent   Shopping Independent   Prior Level Of Mobility Independent Without Device in Home   Driving / Transportation Driving Independent   Occupation (Pre-Hospital Vocational) 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)   Prior Functioning: Everyday Activities   Self Care Independent   Indoor Mobility (Ambulation) Independent   Stairs Independent   Functional Cognition Independent   Prior Device Use None of the given options   Cognitive Pattern Assessment   Cognitive Pattern Assessment Used BIMS   Brief Interview for Mental Status (BIMS)   Repetition of Three Words (First Attempt) 3   Temporal Orientation: Year Correct   Temporal Orientation: Month Accurate within 5 days   Temporal Orientation: Day Correct   Recall: \"Sock\" Yes, no cue required   Recall: \"Blue\" Yes, no cue required   Recall: \"Bed\" No, could not recall   BIMS Summary Score 13   Confusion Assessment Method (CAM)   Is there evidence of an acute " change in mental status from the patient's baseline? No   Inattention Behavior not present   Disorganized thinking Behavior not present   Altered level of consciousness Behavior not present   Active ROM Upper Body   Active ROM Upper Body    (not formally tested, though able to complete ADLs this date)   Strength Upper Body   Upper Body Strength    (pt reports L sided weakness from hx o CVA)   Balance Assessment   Sitting Balance (Static) Fair   Sitting Balance (Dynamic) Fair -   Standing Balance (Static) Poor +   Standing Balance (Dynamic) Poor +   Weight Shift Sitting Fair   Bed Mobility    Supine to Sit Minimal Assist   Sit to Stand Minimal Assist   Eating   Assistance Needed Set-up / clean-up   CARE Score - Eating 5   Eating Discharge Goal   Discharge Goal 6   Oral Hygiene   Assistance Needed Incidental touching   Physical Assistance Level No physical assistance   CARE Score - Oral Hygiene 4   Oral Hygiene Discharge Goal   Discharge Goal 6   Shower/Bathe Self   Assistance Needed Physical assistance   Physical Assistance Level 25% or less   CARE Score - Shower/Bathe Self 3   Shower/Bathe Self Discharge Goal   Discharge Goal 6   Upper Body Dressing   Assistance Needed Supervision   Physical Assistance Level No physical assistance   CARE Score - Upper Body Dressing 4   Upper Body Dressing Discharge Goal   Discharge Goal 6   Lower Body Dressing   Assistance Needed Physical assistance   Physical Assistance Level 25% or less   CARE Score - Lower Body Dressing 3   Lower Body Dressing Discharge Goal   Discharge Goal 6   Putting On/Taking Off Footwear   Assistance Needed Physical assistance   Physical Assistance Level 26%-50%   CARE Score - Putting On/Taking Off Footwear 3   Putting On/Taking Off Footwear Discharge Goal   Discharge Goal 6   Toileting Hygiene   Assistance Needed Physical assistance   Physical Assistance Level 25% or less   CARE Score - Toileting Hygiene 3   Toileting Hygiene Discharge Goal   Discharge Goal  6   Toilet Transfer   Assistance Needed Physical assistance   Physical Assistance Level 25% or less   CARE Score - Toilet Transfer 3   Toilet Transfer Discharge Goal   Discharge Goal 6   Hearing, Speech, and Vision   Ability to Hear Adequate   Ability to See in Adequate Light Impaired   Expression of Ideas and Wants Without difficulty   Understanding Verbal and Non-Verbal Content Understands   Functional Level of Assist   Eating Supervision   Grooming Contact Guard Assist;Standing   Bathing Minimal Assist   Upper Body Dressing Supervision   Lower Body Dressing Minimal Assist   Toileting Minimal Assist   Bed, Chair, Wheelchair Transfer Contact Guard Assist   Toilet Transfers Minimal Assist   Tub / Shower Transfers Minimal Assist   Comprehension Modified Independent   Expression Modified Independent   Problem Solving Modified Independent   Memory Modified Independent   Problem List   Problem List Decreased Active Daily Living Skills;Decreased Upper Extremity Strength Right;Decreased Upper Extremity Strength Left;Decreased Functional Mobility;Decreased Activity Tolerance;Impaired Postural Control / Balance   Precautions   Precautions Fall Risk;Weight Bearing As Tolerated Left Lower Extremity   Current Discharge Plan   Current Discharge Plan Return to Prior Living Situation   Benefit    Therapy Benefit Patient Would Benefit from Inpatient Rehab Occupational Therapy to Maximize [place] with ADLs, IADLs and Functional Mobility.   Interdisciplinary Plan of Care Collaboration   Patient Position at End of Therapy Seated;Self Releasing Lap Belt Applied;Call Light within Reach;Tray Table within Reach;Phone within Reach   Equipment Needs   Assistive Device / DME Front-Wheel Walker;Shower Chair;Grab Bars In Shower / Tub   Strengths & Barriers   Strengths Able to follow instructions;Alert and oriented;Good insight into deficits/needs;Independent prior level of function;Making steady progress towards goals;Manages pain  appropriately;Motivated for self care and independence;Pleasant and cooperative;Supportive family;Willingly participates in therapeutic activities       Assessment  Patient is 73 y.o. female s/p IMN procedure of L hip following GLF at home.    Pt lives in a 1 story home with 4 PASCALE. Pt reports bathroom set-up includes: Walk-in Shower, GB in shower, Shower chair, and raised toilet seat . Pt reports independence with ADLs and IADLs (including driving).     At time of evaluation patient presents below functional baseline w/ primary barriers being WB through LLE, impaired balance, pain/pain tolerance, activity tolerance. They will benefit from daily skilled OT to maximize independence w/ ADLs, IADLs, and functional mobility.      Plan  Recommend Occupational Therapy  minutes per day 5-7 days per week for 2 weeks for the following treatments:  OT Group Therapy, OT Self Care/ADL, OT Community Reintegration, OT Neuro Re-Ed/Balance, OT Therapeutic Activity, OT Evaluation, and OT Therapeutic Exercise.    Passport items to be completed:  Perform bathroom transfers, complete dressing, complete feeding, get ready for the day, prepare a simple meal, participate in household tasks, adapt home for safety needs, demonstrate home exercise program, complete caregiver training     Goals:  Long term and short term goals have been discussed with patient and they are in agreement.    Occupational Therapy Goals (Active)       Problem: Bathing       Dates: Start: 04/18/23         Goal: STG-Within one week, patient will bathe with CGA overall with AE/DME as needed.       Dates: Start: 04/18/23               Problem: Dressing       Dates: Start: 04/18/23         Goal: STG-Within one week, patient will dress LB with CGA overall with AE/DME as needed.       Dates: Start: 04/18/23               Problem: Functional Transfers       Dates: Start: 04/18/23         Goal: STG-Within one week, patient will transfer to toilet with SBA overall with  AE/DME as needed.       Dates: Start: 04/18/23               Problem: IADL's       Dates: Start: 04/18/23         Goal: STG-Within one week, patient will access kitchen area with SBA overall with AE/DME as needed.       Dates: Start: 04/18/23               Problem: OT Long Term Goals       Dates: Start: 04/18/23         Goal: LTG-By discharge, patient will complete basic self care tasks with mod I overall using AE/DME as needed.       Dates: Start: 04/18/23            Goal: LTG-By discharge, patient will perform bathroom transfers with mod I overall with AE/DME as needed.       Dates: Start: 04/18/23            Goal: LTG-By discharge, patient will complete basic home management with supervision-mod I overall using AE/DME as needed.       Dates: Start: 04/18/23               Problem: Toileting       Dates: Start: 04/18/23         Goal: STG-Within one week, patient will complete toileting tasks with CGA overall with AE/DME as needed.       Dates: Start: 04/18/23

## 2023-04-18 NOTE — DISCHARGE PLANNING
CASE MANAGEMENT INITIAL ASSESSMENT    Admit Date:  4/17/2023     CM to meet with patient to discuss role of case management / discharge planning / team conference.   Patient is a  73 y.o. female transferred from [facility].    Diagnosis: Hip fracture requiring operative repair, left, closed, initial encounter ([facility]) [S72.002A]  S/p left hip fracture [Z87.81]    Co-morbidities:   Patient Active Problem List    Diagnosis Date Noted    Acute blood loss anemia 04/18/2023    S/p left hip fracture 04/17/2023    Hip fracture requiring operative repair, left, closed, initial encounter ([facility]) 04/14/2023    Fall 04/14/2023    Hypertensive urgency 04/14/2023    Traumatic rhabdomyolysis ([facility]) 04/14/2023    Leukocytosis 04/14/2023    Alcohol dependence ([facility]) 04/14/2023    ACP (advance care planning) 04/14/2023    Hyperglycemia 04/14/2023     Prior Living Situation:  Housing / Facility: Mobile Home  Lives with - Patient's Self Care Capacity: Alone and Able to Care For Self    Prior Level of Function:  Medication Management: Independent  Finances: Independent  Home Management: Independent  Shopping: Independent  Prior Level Of Mobility: Independent Without Device in Home  Driving / Transportation: Driving Independent    Support Systems:  Primary : Nichol No-daughter: [phone] or Tiffanie Rosas-daughter: [phone]         Previous Services Utilized:   Equipment Owned: Single Point Cane, Tub / Shower Seat, Grab Bar(s) In Tub / Shower, Raised Toilet Seat Without Arms, Hand Held Shower, Long Handled Shoehorn  Prior Services: None    Other Information:  Occupation (Pre-Hospital Vocational): 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    Plan:  1. Continue to follow patient through hospitalization and provide discharge planning in collaboration with patient, family, physicians and  ancillary services.     2. Utilize community resources to ensure a safe discharge.

## 2023-04-18 NOTE — THERAPY
"Physical Therapy   Daily Treatment     Patient Name: Lorena No  Age:  73 y.o., Sex:  female  Medical Record #: [id number]  Today's Date: 4/18/2023     Precautions  Precautions: Weight Bearing As Tolerated Left Lower Extremity, Fall Risk  Comments: LLE antalgia    Subjective    \"I'm good\"     Objective       04/18/23 1501   PT Charge Group   PT Gait Training (Units) 1   PT Therapeutic Activities (Units) 1   PT Total Time Spent   PT Individual Total Time Spent (Mins) 30   Pain 0 - 10 Group   Location Hip   Location Orientation Left   Pain Rating Scale (NPRS) 5  (pt reports no pain at rest)   Gait Functional Level of Assist    Gait Level Of Assist Contact Guard Assist   Assistive Device Front Wheel Walker   Distance (Feet) 40   # of Times Distance was Traveled 2   Deviation Antalgic;Step To;Decreased Base Of Support;Bradykinetic;Decreased Heel Strike  (poor weight acceptance through LLE, minimal improvement with cues. Pt with discontinuous FWW negotiation.)   Stairs Functional Level of Assist   Level of Assist with Stairs Minimal Assist   # of Stairs Climbed 6  (4\" steps with step to gait)   Stairs Description Extra time;Hand rails;Limited by fatigue;Safety concerns;Supervision for safety;Verbal cueing   Sitting Lower Body Exercises   Ankle Pumps 1 set of 10;Bilateral   Long Arc Quad 1 set of 10;Bilateral   Marching Reciprocal;1 set of 10  (with active assist for LLE)   Sit to Stand   (5x with cues for anterior weight shift and hand placement, Dominic progressed to CG)   Bed Mobility    Sit to Stand Contact Guard Assist  (Dominic progressed to CG, FWW)   Interdisciplinary Plan of Care Collaboration   IDT Collaboration with  Physical Therapist   Patient Position at End of Therapy Seated;Chair Alarm On;Call Light within Reach;Tray Table within Reach  (pt provided with ice for hip)       Education on healing timeframe and expectations, benefits of weight acceptance, transfer training.     Assessment    Pt tolerated session " well with improvement noted post cues.     Strengths: Able to follow instructions, Alert and oriented, Effective communication skills, Good insight into deficits/needs, Independent prior level of function, Making steady progress towards goals, Motivated for self care and independence, Pleasant and cooperative, Willingly participates in therapeutic activities  Barriers: Decreased endurance, Generalized weakness, Home accessibility, Impaired balance, Limited mobility, Pain, Poor family support    Plan    Transfers, weight acceptance, ambulation progression, LE strengthening, dynamic balance.     Passport items to be completed:  Get in/out of bed safely, in/out of a vehicle, safely use mobility device, walk or wheel around home/community, navigate up and down stairs, show how to get up/down from the ground, ensure home is accessible, demonstrate HEP, complete caregiver training    Physical Therapy Problems (Active)       Problem: Balance       Dates: Start: 04/18/23         Goal: STG-Within one week, patient will initiate BLE ther ex strengthening towards HEP implementation.       Dates: Start: 04/18/23               Problem: Mobility       Dates: Start: 04/18/23         Goal: STG-Within one week, patient will ambulate 2x 50 feet with FWW at SBA level.       Dates: Start: 04/18/23               Problem: Mobility Transfers       Dates: Start: 04/18/23         Goal: STG-Within one week, patient will perform bed mobility at SBA level including BLE management in/out of bed.       Dates: Start: 04/18/23            Goal: STG-Within one week, patient will sit to stand at SBA level with FWW.       Dates: Start: 04/18/23            Goal: STG-Within one week, patient will transfer bed to chair at SBA level with FWW       Dates: Start: 04/18/23               Problem: PT-Long Term Goals       Dates: Start: 04/18/23         Goal: LTG-By discharge, patient will ambulate 150 feet with FWW at IND level indoors & outdoors.       Dates:  Start: 04/18/23            Goal: LTG-By discharge, patient will transfer one surface to another with FWW at IND level.       Dates: Start: 04/18/23            Goal: LTG-By discharge, patient will perform home exercise program with handout at IND level.       Dates: Start: 04/18/23            Goal: LTG-By discharge, patient will ambulate up/down 4 stairs with B railings at IND level.       Dates: Start: 04/18/23            Goal: LTG-By discharge, patient will transfer in/out of a car with FWW at IND level.       Dates: Start: 04/18/23

## 2023-04-18 NOTE — CARE PLAN
"The patient is Watcher - Medium risk of patient condition declining or worsening    Shift Goals  Clinical Goals: safety  Patient Goals: pain management, safety      Problem: Fall Risk - Rehab  Goal: Patient will remain free from falls  Outcome: Progressing    Michelle English Fall risk Assessment Score: 11    Moderate fall risk Interventions  - Bed and strip alarm   - Yellow sign by the door   - Yellow wrist band \"Fall risk\"  - Room near to the nurse station  - Do not leave patient unattended in the bathroom  - Fall risk education provided      Problem: Pain - Standard  Goal: Alleviation of pain or a reduction in pain to the patient’s comfort goal  Outcome: Progressing     Patient c/o 6/10 pain in hip.  Medication given per MAR.  "

## 2023-04-18 NOTE — FLOWSHEET NOTE
04/17/23 1817   Events/Summary/Plan   Events/Summary/Plan RT Assesment   Vital Signs   Pulse 94   Respiration 18   Pulse Oximetry 97 %   $ Pulse Oximetry (Spot Check) Yes   Respiratory Assessment   Respiratory Pattern Within Normal Limits   Level of Consciousness Alert   Chest Exam   Work Of Breathing / Effort Within Normal Limits   Oxygen   O2 Delivery Device None - Room Air   Smoking History   Have you ever smoked Yes

## 2023-04-18 NOTE — PROGRESS NOTES
Patient admitted to facility at 1530 via GMT; accompanied by hospital transport. Patient assisted to room and positioned in bed for comfort and safety, call light within reach. Patient assisted with stowing belongings and oriented to room and facility. Admission assessment performed and documented in computer. Admission paperwork completed, signed copies placed in chart. Will continue to monitor.

## 2023-04-18 NOTE — PROGRESS NOTES
4 Eyes Skin Assessment Completed by MITCHELL Maria and MITCHELL Grove.    Head WDL  Ears WDL  Nose WDL  Mouth WDL  Neck WDL  Breast/Chest WDL  Shoulder Blades WDL  Spine WDL  (R) Arm/Elbow/Hand WDL  (L) Arm/Elbow/Hand WDL  Abdomen WDL  Groin Bruising  Scrotum/Coccyx/Buttocks WDL  (R) Leg WDL  (L) Leg Bruising, Swelling, and Incision  (R) Heel/Foot/Toe WDL  (L) Heel/Foot/Toe WDL          Devices In Places N/A      Interventions In Place N/A    Possible Skin Injury No    Pictures Uploaded Into Epic Yes  Wound Consult Placed N/A  RN Wound Prevention Protocol Ordered No

## 2023-04-18 NOTE — PROGRESS NOTES
Patient admitted to facility at 1545 via stretcher; accompanied by hospital transport.  Patient assisted to room and positioned in bed for comfort and safety; call light within reach.  Patient assisted with stowing belongings and oriented to room and facility.  Admission assessment performed and documented in computer. Patient received and reviewed education binder. Admission paperwork completed; signed copies placed in chart.  Will continue to monitor.

## 2023-04-18 NOTE — ASSESSMENT & PLAN NOTE
Hb: 11.7 (4/14) --> 9.4 (4/15) --> 7.7 (4/16) --> 7.2 (4/17) --> 7.0 (4/18) --> 7.9 (4/19) --> 7.5 (4/21)  H&H was ok before surgery  Has left upper left swelling   Recent loss likely 2nd to surgery with post-op bleed  Fe: 39, sats 19%  B12: wnl  SFOB (-) x 1  Cont Fe supplements  Monitor for now

## 2023-04-18 NOTE — CARE PLAN
"  Problem: Fall Risk - Rehab  Goal: Patient will remain free from falls  Outcome: Progressing  Note: Martinez Amador Fall risk Assessment Score: 11    Moderate fall risk Interventions  - Bed and strip alarm   - Yellow sign by the door   - Yellow wrist band \"Fall risk\"  - Room near to the nurse station  - Do not leave patient unattended in the bathroom  - Fall risk education provided    Problem: Pain - Standard  Goal: Alleviation of pain or a reduction in pain to the patient’s comfort goal  Outcome: Progressing  Note: Assessed for pain and discomfort , medicated with oxycodone 10 mg for left hip pain,[ see mar] with relief.   The patient is Watcher - Medium risk of patient condition declining or worsening    Shift Goals  Clinical Goals: safety  Patient Goals: pain management, safety    Progress made toward(s) clinical / shift goals:  Pt free from fall and injury.    "

## 2023-04-18 NOTE — CONSULTS
DATE OF SERVICE:  4/18/2023    REQUESTING PHYSICIAN:  Rolando Burton MD    CHIEF COMPLAINT / REASON FOR CONSULTATION:   Anemia with acute blood loss    HISTORY OF PRESENT ILLNESS:  Adapted from Dr. Burton' H&P:  THis is a 72 y/o female with a PMH significant for hypertension who who experienced a GLF at home with subsequent left hip pain.  She was brought to [facility] and was found to have a left hip fracture.  Patient underwent IMN treatment on 4/15/23 with Dr. Isidro for the left hip fracture. Post-operative course complicated by severe anemia, leukocytosis, and variable blood pressures.    Because of the patient's weakness and debility, Rehab was consulted, evaluated the patient, and was deemed a good Rehab candidate.  The patient was transferred over to the Rehab facility on 4/17/2022.      The patient denies fever, chills, nausea, vomiting, headaches, blurry vision, or chest pain.    REVIEW OF SYSTEMS: All review of systems are negative pre AMA and CMS criteria except for that stated in the HPI.    PAST MEDICAL HISTORY:  No past medical history on file.    PAST SURGICAL HISTORY:  Past Surgical History:   Procedure Laterality Date    PB OPEN FIX INTER/SUBTROCH FX,IMPLNT Left 4/15/2023    Procedure: INSERTION, INTRAMEDULLARY CONSTANCE, FEMUR, PROXIMAL;  Surgeon: Elvin Isidro M.D.;  Location: SURGERY [facility];  Service: Orthopedics       No Known Allergies    CURRENT MEDICATIONS:    Current Facility-Administered Medications:     Respiratory Therapy Consult    Pharmacy Consult Request    hydrALAZINE    senna-docusate **AND** polyethylene glycol/lytes **AND** magnesium hydroxide **AND** bisacodyl    omeprazole    mag hydrox-al hydrox-simeth    ondansetron **OR** ondansetron    traZODone    sodium chloride    oxyCODONE immediate-release **OR** oxyCODONE immediate-release    acetaminophen **FOLLOWED BY** [DISCONTINUED] acetaminophen    celecoxib **FOLLOWED BY** [START ON 4/19/2023] celecoxib    enoxaparin (LOVENOX)  injection    lidocaine    [START ON 4/20/2023] acetaminophen    Social History     Socioeconomic History    Marital status:    Tobacco Use    Smoking status: Never     Passive exposure: Never    Smokeless tobacco: Never   Vaping Use    Vaping Use: Never used       FAMILY HISTORY:  was reviewed and is not pertinent to this consultation.    PHYSICAL EXAMINATION:  VITAL SIGNS:  Temp is 98.1, blood pressure is 111/62, heart rate is 88 respiratory rate is 10.  GENERAL:  Patient was lying in bed in no distress.  HEENT:  Pupils were equal, round and reactive to light and accomodation.  Oral mucosa was pink and moist.  NECK:  Soft.  Supple.  No JVD.  HEART:  Regular rate and rhythm.  Normal S1 and S2.  No murmurs were appreciated.  LUNGS:  Are clear to auscultation bilaterally.  ABDOMEN:  Soft, non tender, non distended.  Bowels sound were positive in all four quadrants.  EXTREMITIES:  No clubbing, cyanosis.  There was noted left upper leg swelling.  NEUROLOGIC:  Cranial nerves two through twelve were grossly intact.    LABS:  Lab Results   Component Value Date/Time    SODIUM 140 04/18/2023 06:09 AM    POTASSIUM 4.1 04/18/2023 06:09 AM    CHLORIDE 109 04/18/2023 06:09 AM    CO2 23 04/18/2023 06:09 AM    GLUCOSE 94 04/18/2023 06:09 AM    BUN 10 04/18/2023 06:09 AM    CREATININE 0.51 04/18/2023 06:09 AM      Lab Results   Component Value Date/Time    WBC 7.4 04/18/2023 06:09 AM    RBC 2.24 (L) 04/18/2023 06:09 AM    HEMOGLOBIN 7.0 (L) 04/18/2023 06:09 AM    HEMATOCRIT 20.8 (L) 04/18/2023 06:09 AM    MCV 92.9 04/18/2023 06:09 AM    MCH 31.3 04/18/2023 06:09 AM    MCHC 33.7 04/18/2023 06:09 AM    MPV 9.8 04/18/2023 06:09 AM    NEUTSPOLYS 47.30 04/18/2023 06:09 AM    LYMPHOCYTES 35.50 04/18/2023 06:09 AM    MONOCYTES 11.30 04/18/2023 06:09 AM    EOSINOPHILS 4.80 04/18/2023 06:09 AM    BASOPHILS 0.70 04/18/2023 06:09 AM      No results found for: PROTHROMBTM, INR       Acute blood loss anemia  Hb: 11.7 (4/14) --> 9.4  (4/15) --> 7.7 (4/16) --> 7.2 (4/17) --> 7.0 (4/18)  H&H was ok before surgery  Has left upper left swelling   Recent loss likely 2nd to surgery with post-op bleed  Will check Fe, B12, and FOB  Will transfuse 1 unit PRBC's  Monitor for any ongoing bleeding    Hip fracture requiring operative repair, left, closed, initial encounter ([facility])  2nd to [facility]  S/P surgical repair      This case has been discussed with the attending Physiatrist.    Thank you for the consultation.  Will follow the patient with you.

## 2023-04-18 NOTE — THERAPY
Occupational Therapy  Daily Treatment     Patient Name: Lorena No  Age:  73 y.o., Sex:  female  Medical Record #: [id number]  Today's Date: 4/18/2023     Precautions  Precautions: Weight Bearing As Tolerated Left Lower Extremity, Fall Risk  Comments: LLE antalgia         Subjective    Pt up in w/c upon arrival, requested to work on UBD strength.      Objective     04/18/23 1331   OT Charge Group   OT Therapeutic Exercise (Units) 2   OT Total Time Spent   OT Individual Total Time Spent (Mins) 30   Sitting Upper Body Exercises   Tricep Press 3 sets of 10;Bilateral;Weight (See Comments for lbs)  (20# facing fwd on Rickshaw)   Sitting Lower Body Exercises   Nustep Resistance Level 3  (10 min for BLE AROM and CV endurance)   Interdisciplinary Plan of Care Collaboration   IDT Collaboration with  Occupational Therapist   Patient Position at End of Therapy Seated;Chair Alarm On;Call Light within Reach;Tray Table within Reach;Phone within Reach   Collaboration Comments CLOF/POC       Assessment    Pt tolerated OT session well. No c/o pain or discomfort with Nustep or Rickshaw exercises.   Strengths: Able to follow instructions, Alert and oriented, Good insight into deficits/needs, Independent prior level of function, Making steady progress towards goals, Manages pain appropriately, Motivated for self care and independence, Pleasant and cooperative, Supportive family, Willingly participates in therapeutic activities    Plan    ADLs, functional mob/transfers, UE strength, endurance, balance    Passport items to be completed:  Perform bathroom transfers, complete dressing, complete feeding, get ready for the day, prepare a simple meal, participate in household tasks, adapt home for safety needs, demonstrate home exercise program, complete caregiver training     Occupational Therapy Goals (Active)       Problem: Bathing       Dates: Start: 04/18/23         Goal: STG-Within one week, patient will bathe with CGA overall with  AE/DME as needed.       Dates: Start: 04/18/23               Problem: Dressing       Dates: Start: 04/18/23         Goal: STG-Within one week, patient will dress LB with CGA overall with AE/DME as needed.       Dates: Start: 04/18/23               Problem: Functional Transfers       Dates: Start: 04/18/23         Goal: STG-Within one week, patient will transfer to toilet with SBA overall with AE/DME as needed.       Dates: Start: 04/18/23               Problem: IADL's       Dates: Start: 04/18/23         Goal: STG-Within one week, patient will access kitchen area with SBA overall with AE/DME as needed.       Dates: Start: 04/18/23               Problem: OT Long Term Goals       Dates: Start: 04/18/23         Goal: LTG-By discharge, patient will complete basic self care tasks with mod I overall using AE/DME as needed.       Dates: Start: 04/18/23            Goal: LTG-By discharge, patient will perform bathroom transfers with mod I overall with AE/DME as needed.       Dates: Start: 04/18/23            Goal: LTG-By discharge, patient will complete basic home management with supervision-mod I overall using AE/DME as needed.       Dates: Start: 04/18/23               Problem: Toileting       Dates: Start: 04/18/23         Goal: STG-Within one week, patient will complete toileting tasks with CGA overall with AE/DME as needed.       Dates: Start: 04/18/23

## 2023-04-18 NOTE — CARE PLAN
"The patient is Watcher - Medium risk of patient condition declining or worsening    Shift Goals  Clinical Goals: safety  Patient Goals: pain management, safety      Problem: Knowledge Deficit - Standard  Goal: Patient and family/care givers will demonstrate understanding of plan of care, disease process/condition, diagnostic tests and medications  Outcome: Progressing    Patient educated about stay and stated understanding.     Problem: Fall Risk - Rehab  Goal: Patient will remain free from falls  Outcome: Progressing     Michelle English Fall risk Assessment Score: 11      Moderate fall risk Interventions  - Bed and strip alarm   - Yellow sign by the door   - Yellow wrist band \"Fall risk\"  - Room near to the nurse station  - Do not leave patient unattended in the bathroom  - Fall risk education provided      "

## 2023-04-18 NOTE — FLOWSHEET NOTE
04/17/23 1818   Protocol Assessment   Initial Assessment Yes   Patient History   Pulmonary Diagnosis dNone   Procedures Relevant to Respiratory Status None   Home O2 No   Nocturnal CPAP No   Home Treatments/Frequency No   Sleep Apnea Screening   Have you had a sleep study? No   Have you been diagnosed with sleep apnea? No   S - Have you been told that you SNORE? 0   T - Are you often TIRED during the day? 0   O - Do you know if you stop breathing or has anyone witnessed you stop breathing while you were asleep? (OBSTRUCTION) 0   P - Do you have high blood PRESSURE or on medication to control high blood pressure? 0   B - Is your Body Mass Index greater than 35? (BMI) 0   A - Are you 50 years old or older? (AGE) 1   N - Are you a male with a NECK circumference greater than 17 inches, or a female with a neck circumference greater than 16 inches? 0   G - Are you male? (GENDER) 0   Stop Bang Total Score 1   COPD Risk Screening   Do you have a history of COPD? No   COPD Population Screener   During the past 4 weeks, how much did you feel short of breath? 0   Do you ever cough up any mucus or phlegm? 0   In the past 12 months, you do less than you used to because of your breathing problems 0   Have you smoked at least 100 cigarettes in your entire life? 2   How old are you? 2   COPD Screening Score 4   COPD Coordinator Not Recommended Yes

## 2023-04-18 NOTE — PROGRESS NOTES
The patient is Watcher - Medium risk of patient condition declining or worsening     Shift Goals: safety  Clinical Goals: safety     Problem: Knowledge Deficit - Standard  Goal: Patient and family/care givers will demonstrate understanding of plan of care, disease process/condition, diagnostic tests and medications  Note: Patient is a new admit. Educated on safety precautions and fall prevention measures. Covid testing is done, patient is to remain in isolation untill negative test result.

## 2023-04-18 NOTE — THERAPY
Physical Therapy   Initial Evaluation     Patient Name: Lorena No  Age:  73 y.o., Sex:  female  Medical Record #: [id number]  Today's Date: 4/18/2023     Subjective    Patient received at 0940 sitting in w/c at bedside; agreeable to PT.     Objective       04/18/23 0931   Therapy Missed   Missed Therapy (Minutes) 10   Reason For Missed Therapy Non-Medical - Other (Please Comment)  (Unable to locate patient after today's room change; Time madeup at end of session.)   PT Charge Group   Charges Yes   PT Therapeutic Exercise (Units) 1   PT Evaluation PT Evaluation Mod   PT Total Time Spent   PT Individual Total Time Spent (Mins) 60   Prior Living Situation   Prior Services None   Housing / Facility Mobile Home   Steps Into Home 4   Steps In Home 0   Rail Both Rail (Steps into Home)   Elevator No   Bathroom Set up Walk In Shower;Shower Curtain;Grab Bars;Shower Chair   Equipment Owned Single Point Cane;Tub / Shower Seat;Grab Bar(s) In Tub / Shower;Raised Toilet Seat Without Arms;Hand Held Shower;Long Handled Shoehorn   Lives with - Patient's Self Care Capacity Alone and Able to Care For Self   Prior Level of Functional Mobility   Bed Mobility Independent   Transfer Status Independent   Ambulation Independent   Distance Ambulation (Feet)   (community distances)   Assistive Devices Used None   Stairs Independent   Comments Pt reports IND PLOF without AD, still drives, no A with ADLs or IADLs. Pt reports uses L sinnk and R toilet paper vergara to assist with sit to stand from toilet. Prefers to sleep on Left side.   Prior Functioning: Everyday Activities   Self Care Independent   Indoor Mobility (Ambulation) Independent   Stairs Independent   Functional Cognition Independent   Prior Device Use None of the given options   Vitals   O2 (LPM) 0   O2 Delivery Device None - Room Air   Pain   Intervention Rest;Repositioned;Education   Pain 0 - 10 Group   Location Hip   Location Orientation Left;Lateral   Description Aching;Dull    Comfort Goal 0   Therapist Pain Assessment During Activity;5  (4-6 during activity; 2/10 at seated rest)   Passive ROM Lower Body   Passive ROM Lower Body X   Comments L hip flexion and L knee flexion & extension limited by antalgia   Active ROM Lower Body    Active ROM Lower Body  X   Comments L hip flexion and L knee flexion & extension limited by antalgia   Strength Lower Body   Lower Body Strength  X   Lt Hip Flexion Strength 2- (P-)   Comments BLE grossly 3+/5 except for L hip flexion (2-/5) and B ankle DF at 4/5   Bed Mobility    Supine to Sit Contact Guard Assist   Sit to Supine Minimal Assist   Sit to Stand Contact Guard Assist   Scooting Contact Guard Assist   Rolling Contact Guard Assist   Neurological Concerns   Comments Pt denies lower quadrant neuro signs at this time.   Coordination Lower Body    Coordination Lower Body  X   Heel To Shin Right Impaired   Heel To Shin Left Absent   Roll Left and Right   Assistance Needed Incidental touching   Physical Assistance Level No physical assistance   CARE Score - Roll Left and Right 4   Roll Left and Right Discharge Goal   Discharge Goal 6   Sit to Lying   Assistance Needed Physical assistance   Physical Assistance Level 25% or less   CARE Score - Sit to Lying 3   Sit to Lying Discharge Goal   Discharge Goal 6   Lying to Sitting on Side of Bed   Assistance Needed Incidental touching   Physical Assistance Level No physical assistance   CARE Score - Lying to Sitting on Side of Bed 4   Lying to Sitting on Side of Bed Discharge Goal   Discharge Goal 6   Sit to Stand   Assistance Needed Incidental touching   Physical Assistance Level No physical assistance   CARE Score - Sit to Stand 4   Sit to Stand Discharge Goal   Discharge Goal 6   Chair/Bed-to-Chair Transfer   Assistance Needed Incidental touching   Physical Assistance Level No physical assistance   CARE Score - Chair/Bed-to-Chair Transfer 4   Chair/Bed-to-Chair Transfer Discharge Goal   Discharge Goal 6    Car Transfer   Comment Unable upon attempt   Reason if not Attempted Medical concerns   CARE Score - Car Transfer 88   Car Transfer Discharge Goal   Discharge Goal 6   Walk 10 Feet   Assistance Needed Incidental touching   Physical Assistance Level No physical assistance   CARE Score - Walk 10 Feet 4   Walk 10 Feet Discharge Goal   Discharge Goal 6   Walk 50 Feet with Two Turns   Assistance Needed Incidental touching   Physical Assistance Level No physical assistance   CARE Score - Walk 50 Feet with Two Turns 4   Walk 50 Feet with Two Turns Discharge Goal   Discharge Goal 6   Walk 150 Feet   Comment Unable upon attempt   Reason if not Attempted Medical concerns   CARE Score - Walk 150 Feet 88   Walk 150 Feet Discharge Goal   Discharge Goal 6   Walking 10 Feet on Uneven Surfaces   Assistance Needed Incidental touching   Physical Assistance Level No physical assistance   CARE Score - Walking 10 Feet on Uneven Surfaces 4   Walking 10 Feet on Uneven Surfaces Discharge Goal   Discharge Goal 6   1 Step (Curb)   Comment Unable upon attempt   Reason if not Attempted Medical concerns   CARE Score - 1 Step (Curb) 88   1 Step (Curb) Discharge Goal   Discharge Goal 6   4 Steps   Comment Unable upon attempt   Reason if not Attempted Medical concerns   CARE Score - 4 Steps 88   4 Steps Discharge Goal   Discharge Goal 6   12 Steps   Comment Unable upon attempt   Reason if not Attempted Medical concerns   CARE Score - 12 Steps 88   12 Steps Discharge Goal   Discharge Goal 6   Picking Up Object   Assistance Needed Incidental touching   Physical Assistance Level No physical assistance   CARE Score - Picking Up Object 4   Picking Up Object Discharge Goal   Discharge Goal 6   Wheel 50 Feet with Two Turns   Reason if not Attempted Activity not applicable   CARE Score - Wheel 50 Feet with Two Turns 9   Type of Wheelchair/Scooter Manual   Wheel 50 Feet with Two Turns Discharge Goal   Discharge Goal 9   Wheel 150 Feet   Reason if not  Attempted Activity not applicable   CARE Score - Wheel 150 Feet 9   Type of Wheelchair/Scooter Manual   Wheel 150 Feet Discharge Goal   Discharge Goal 9   Gait Functional Level of Assist    Gait Level Of Assist Contact Guard Assist   Assistive Device Front Wheel Walker   Distance (Feet) 64   # of Times Distance was Traveled 1  (limited distance during second rep/attempt due to LLE antalgia)   Deviation Antalgic;Step To;Decreased Base Of Support;Bradykinetic;Decreased Heel Strike   Wheelchair Functional Level of Assist   Wheelchair Assist   (n/a)   Distance Wheelchair (Feet or Distance)   (n/a)   Wheelchair Description   (n/a but discussed B wheel locks and legrests functions with patient demonstrating use 2x)   Stairs Functional Level of Assist   Level of Assist with Stairs Unable to Participate   # of Stairs Climbed 0   Stairs Description Safety concerns   Transfer Functional Level of Assist   Bed, Chair, Wheelchair Transfer Contact Guard Assist   Bed Chair Wheelchair Transfer Description Adaptive equipment;Increased time;Non-hospital bed;Set-up of equipment;Supervision for safety;Verbal cueing   Problem List    Problems Pain;Impaired Bed Mobility;Impaired Transfers;Impaired Ambulation;Functional ROM Deficit;Functional Strength Deficit;Impaired Balance;Decreased Activity Tolerance   Precautions   Precautions Weight Bearing As Tolerated Left Lower Extremity;Fall Risk   Comments LLE antalgia   Current Discharge Plan   Current Discharge Plan Return to Prior Living Situation   Interdisciplinary Plan of Care Collaboration   IDT Collaboration with  Occupational Therapist;Physician   Patient Position at End of Therapy Seated;Chair Alarm On;Self Releasing Lap Belt Applied;Call Light within Reach;Tray Table within Reach;Phone within Reach   Collaboration Comments ARIANE VEGA present for POC discussion and present   Benefit   Therapy Benefit Patient Would Benefit from Inpatient Rehabilitation Physical Therapy to Maximize  Functional Sawyer with ADLs, IADLs and Mobility.   Strengths & Barriers   Strengths Able to follow instructions;Alert and oriented;Effective communication skills;Good insight into deficits/needs;Independent prior level of function;Making steady progress towards goals;Motivated for self care and independence;Pleasant and cooperative;Willingly participates in therapeutic activities   Barriers Decreased endurance;Generalized weakness;Home accessibility;Impaired balance;Limited mobility;Pain;Poor family support        04/18/23 0932   Sitting Lower Body Exercises   Sitting Lower Body Exercises Yes  (seated supported)   Ankle Pumps 2 sets of 10   Long Arc Quad 1 set of 10   Marching   (2 sets of 5 reps)         Assessment  Patient is 73 y.o. female with a diagnosis of GLF resuling in Acute comminuted intertrochanteric fracture of the left femur.  Patient underwent Open treatment with intramedullary nailing of left intertrochanteric femur fracture on 4/15/23.    Additional factors influencing patient status / progress (ie: cognitive factors, co-morbidities, social support, etc): WBAT LLE, Moderate antalgia of L hip including limited ROM and strength, Moderate L hip/thigh edema (2+); IND PLOF without AD including driving, is retired; Lives alone with 4 steps to enter home with B railings; limited social support but daughter may provide additional A prn after d/c per pt report; PMH includes HTN, alcohol dependence, and hyperglycemia; recent med hx includes Acute blood loss anemia, Traumatic rhabdomyolysis.      Plan  Recommend Physical Therapy  minutes per day 5-6 days per week for 2 weeks for the following treatments:  PT Group Therapy, PT Gait Training, PT Therapeutic Exercises, PT TENS Application, PT Neuro Re-Ed/Balance, PT Therapeutic Activity, and PT Manual Therapy.    Passport items to be completed:  Get in/out of bed safely, in/out of a vehicle, safely use mobility device, walk or wheel around  home/community, navigate up and down stairs, show how to get up/down from the ground, ensure home is accessible, demonstrate HEP, complete caregiver training    Goals:  Long term and short term goals have been discussed with patient and they are in agreement.    Physical Therapy Problems (Active)       Problem: Balance       Dates: Start: 04/18/23         Goal: STG-Within one week, patient will initiate BLE ther ex strengthening towards HEP implementation.       Dates: Start: 04/18/23               Problem: Mobility       Dates: Start: 04/18/23         Goal: STG-Within one week, patient will ambulate 2x 50 feet with FWW at SBA level.       Dates: Start: 04/18/23               Problem: Mobility Transfers       Dates: Start: 04/18/23         Goal: STG-Within one week, patient will perform bed mobility at SBA level including BLE management in/out of bed.       Dates: Start: 04/18/23            Goal: STG-Within one week, patient will sit to stand at SBA level with FWW.       Dates: Start: 04/18/23            Goal: STG-Within one week, patient will transfer bed to chair at SBA level with FWW       Dates: Start: 04/18/23               Problem: PT-Long Term Goals       Dates: Start: 04/18/23         Goal: LTG-By discharge, patient will ambulate 150 feet with FWW at IND level indoors & outdoors.       Dates: Start: 04/18/23            Goal: LTG-By discharge, patient will transfer one surface to another with FWW at IND level.       Dates: Start: 04/18/23            Goal: LTG-By discharge, patient will perform home exercise program with handout at IND level.       Dates: Start: 04/18/23            Goal: LTG-By discharge, patient will ambulate up/down 4 stairs with B railings at IND level.       Dates: Start: 04/18/23            Goal: LTG-By discharge, patient will transfer in/out of a car with FWW at IND level.       Dates: Start: 04/18/23

## 2023-04-18 NOTE — PROGRESS NOTES
"  Physical Medicine & Rehabilitation Progress Note    Encounter Date: 4/18/2023    Chief Complaint: Decreased mobility, hip pain    Interval Events (Subjective):  Patient sitting up in room. She reports therapy evaluations are going well. Discussed about AM labs including improving CPK and worsening anemia. Discussed would consult hospitalist. Denies SOB. Denies chest pain.    Objective:  VITAL SIGNS: /62   Pulse 88   Temp 36.7 °C (98.1 °F) (Oral)   Resp 16   Ht 1.575 m (5' 2\")   Wt 57.1 kg (125 lb 14.4 oz)   SpO2 94%   BMI 23.03 kg/m²   Gen: NAD  Psych: Mood and affect appropriate  CV: RRR, 1+ edema  Resp: CTAB, no upper airway sounds  Abd: NTND  Neuro: AOx4, following commands    Laboratory Values:  Recent Results (from the past 72 hour(s))   CBC WITHOUT DIFFERENTIAL    Collection Time: 04/16/23  2:01 PM   Result Value Ref Range    WBC 11.3 (H) 4.8 - 10.8 K/uL    RBC 2.51 (L) 4.20 - 5.40 M/uL    Hemoglobin 7.7 (L) 12.0 - 16.0 g/dL    Hematocrit 23.7 (L) 37.0 - 47.0 %    MCV 94.4 81.4 - 97.8 fL    MCH 30.7 27.0 - 33.0 pg    MCHC 32.5 (L) 33.6 - 35.0 g/dL    RDW 44.6 35.9 - 50.0 fL    Platelet Count 197 164 - 446 K/uL    MPV 9.9 9.0 - 12.9 fL   Renal Function Panel    Collection Time: 04/16/23  2:01 PM   Result Value Ref Range    Sodium 139 135 - 145 mmol/L    Potassium 3.9 3.6 - 5.5 mmol/L    Chloride 108 96 - 112 mmol/L    Co2 23 20 - 33 mmol/L    Glucose 120 (H) 65 - 99 mg/dL    Creatinine 0.53 0.50 - 1.40 mg/dL    Bun 13 8 - 22 mg/dL    Calcium 8.3 (L) 8.5 - 10.5 mg/dL    Phosphorus 1.3 (L) 2.5 - 4.5 mg/dL    Albumin 3.2 3.2 - 4.9 g/dL   CORRECTED CALCIUM    Collection Time: 04/16/23  2:01 PM   Result Value Ref Range    Correct Calcium 8.9 8.5 - 10.5 mg/dL   ESTIMATED GFR    Collection Time: 04/16/23  2:01 PM   Result Value Ref Range    GFR (CKD-EPI) 99 >60 mL/min/1.73 m 2   CBC WITHOUT DIFFERENTIAL    Collection Time: 04/17/23  7:13 AM   Result Value Ref Range    WBC 7.2 4.8 - 10.8 K/uL    RBC 2.30 " (L) 4.20 - 5.40 M/uL    Hemoglobin 7.2 (L) 12.0 - 16.0 g/dL    Hematocrit 21.4 (L) 37.0 - 47.0 %    MCV 93.0 81.4 - 97.8 fL    MCH 31.3 27.0 - 33.0 pg    MCHC 33.6 33.6 - 35.0 g/dL    RDW 44.8 35.9 - 50.0 fL    Platelet Count 191 164 - 446 K/uL    MPV 9.8 9.0 - 12.9 fL   Renal Function Panel    Collection Time: 04/17/23  7:13 AM   Result Value Ref Range    Sodium 140 135 - 145 mmol/L    Potassium 3.9 3.6 - 5.5 mmol/L    Chloride 109 96 - 112 mmol/L    Co2 24 20 - 33 mmol/L    Glucose 102 (H) 65 - 99 mg/dL    Creatinine 0.47 (L) 0.50 - 1.40 mg/dL    Bun 9 8 - 22 mg/dL    Calcium 8.4 (L) 8.5 - 10.5 mg/dL    Phosphorus 1.8 (L) 2.5 - 4.5 mg/dL    Albumin 2.9 (L) 3.2 - 4.9 g/dL   CORRECTED CALCIUM    Collection Time: 04/17/23  7:13 AM   Result Value Ref Range    Correct Calcium 9.3 8.5 - 10.5 mg/dL   ESTIMATED GFR    Collection Time: 04/17/23  7:13 AM   Result Value Ref Range    GFR (CKD-EPI) 102 >60 mL/min/1.73 m 2   SARS-CoV-2, PCR (In-House)    Collection Time: 04/17/23  5:00 PM    Specimen: Nasal; Respirate   Result Value Ref Range    SARS-CoV-2 Source NP Swab     SARS-CoV-2 by PCR NotDetected    CBC with Differential    Collection Time: 04/18/23  6:09 AM   Result Value Ref Range    WBC 7.4 4.8 - 10.8 K/uL    RBC 2.24 (L) 4.20 - 5.40 M/uL    Hemoglobin 7.0 (L) 12.0 - 16.0 g/dL    Hematocrit 20.8 (L) 37.0 - 47.0 %    MCV 92.9 81.4 - 97.8 fL    MCH 31.3 27.0 - 33.0 pg    MCHC 33.7 33.6 - 35.0 g/dL    RDW 44.1 35.9 - 50.0 fL    Platelet Count 218 164 - 446 K/uL    MPV 9.8 9.0 - 12.9 fL    Neutrophils-Polys 47.30 44.00 - 72.00 %    Lymphocytes 35.50 22.00 - 41.00 %    Monocytes 11.30 0.00 - 13.40 %    Eosinophils 4.80 0.00 - 6.90 %    Basophils 0.70 0.00 - 1.80 %    Immature Granulocytes 0.40 0.00 - 0.90 %    Nucleated RBC 0.00 /100 WBC    Neutrophils (Absolute) 3.49 2.00 - 7.15 K/uL    Lymphs (Absolute) 2.61 1.00 - 4.80 K/uL    Monos (Absolute) 0.83 0.00 - 0.85 K/uL    Eos (Absolute) 0.35 0.00 - 0.51 K/uL    Baso  (Absolute) 0.05 0.00 - 0.12 K/uL    Immature Granulocytes (abs) 0.03 0.00 - 0.11 K/uL    NRBC (Absolute) 0.00 K/uL   Comp Metabolic Panel (CMP)    Collection Time: 04/18/23  6:09 AM   Result Value Ref Range    Sodium 140 135 - 145 mmol/L    Potassium 4.1 3.6 - 5.5 mmol/L    Chloride 109 96 - 112 mmol/L    Co2 23 20 - 33 mmol/L    Anion Gap 8.0 7.0 - 16.0    Glucose 94 65 - 99 mg/dL    Bun 10 8 - 22 mg/dL    Creatinine 0.51 0.50 - 1.40 mg/dL    Calcium 8.4 (L) 8.5 - 10.5 mg/dL    AST(SGOT) 45 12 - 45 U/L    ALT(SGPT) 18 2 - 50 U/L    Alkaline Phosphatase 51 30 - 99 U/L    Total Bilirubin 0.6 0.1 - 1.5 mg/dL    Albumin 3.1 (L) 3.2 - 4.9 g/dL    Total Protein 5.5 (L) 6.0 - 8.2 g/dL    Globulin 2.4 1.9 - 3.5 g/dL    A-G Ratio 1.3 g/dL   HEMOGLOBIN A1C    Collection Time: 04/18/23  6:09 AM   Result Value Ref Range    Glycohemoglobin 5.2 4.0 - 5.6 %    Est Avg Glucose 103 mg/dL   TSH with Reflex to FT4    Collection Time: 04/18/23  6:09 AM   Result Value Ref Range    TSH 3.850 0.380 - 5.330 uIU/mL   Vitamin D, 25-hydroxy (blood)    Collection Time: 04/18/23  6:09 AM   Result Value Ref Range    25-Hydroxy   Vitamin D 25 38 30 - 100 ng/mL   CREATINE KINASE    Collection Time: 04/18/23  6:09 AM   Result Value Ref Range    CPK Total 728 (H) 0 - 154 U/L   MAGNESIUM    Collection Time: 04/18/23  6:09 AM   Result Value Ref Range    Magnesium 1.6 1.5 - 2.5 mg/dL   PHOSPHORUS    Collection Time: 04/18/23  6:09 AM   Result Value Ref Range    Phosphorus 3.2 2.5 - 4.5 mg/dL   CORRECTED CALCIUM    Collection Time: 04/18/23  6:09 AM   Result Value Ref Range    Correct Calcium 9.1 8.5 - 10.5 mg/dL   ESTIMATED GFR    Collection Time: 04/18/23  6:09 AM   Result Value Ref Range    GFR (CKD-EPI) 100 >60 mL/min/1.73 m 2       Medications:  Scheduled Medications   Medication Dose Frequency    Pharmacy Consult Request  1 Each PHARMACY TO DOSE    senna-docusate  2 Tablet BID    omeprazole  20 mg DAILY    acetaminophen  650 mg Q6HRS    celecoxib   200 mg BID    enoxaparin (LOVENOX) injection  40 mg DAILY AT 1800    lidocaine  1 Patch Q24HR     PRN medications: Respiratory Therapy Consult, hydrALAZINE, senna-docusate **AND** polyethylene glycol/lytes **AND** magnesium hydroxide **AND** bisacodyl, mag hydrox-al hydrox-simeth, ondansetron **OR** ondansetron, traZODone, sodium chloride, oxyCODONE immediate-release **OR** oxyCODONE immediate-release, celecoxib **FOLLOWED BY** [START ON 4/19/2023] celecoxib, [START ON 4/20/2023] acetaminophen    Diet:  Current Diet Order   Procedures    Diet Order Diet: Level 6 - Soft and Bite Sized; Liquid level: Level 0 - Thin       Medical Decision Making and Plan:  Left hip fracture - Patient with fall on 4/13/23 with severe hip pain found to have fracture s/p IMN with Dr. Isidro on 4/14/23. Patient is WBAT.  -PT and OT for mobility and ADLs. Per guidelines, 15 hours per week between PT, OT and/or SLP.  -Follow-up Dr. Isidro.      Anemia - Check AM CBC - 7.0. Severe worsening anemia puts patient at high risk for further decline including respiratory failure and shock. Consult hospitalist, may need to type and screen as most likely going to require transfusion. Patient's case was discussed face to face with Hospitalist on [facility] floor.   -Recheck CBC, Fe panel, FOBT     Leukocytosis - Check AM CBC. Most likely reactionary - improved     Azotemia - Check AM CMP - resolved     Hypocalcemia - Check AM CMP - 9.1, improved.      Hypophosphatemia - Check AM Mag/Phosph - improved. Not on supplements     Rhabdomyolysis - Check AM CPK - 728 trending down.     Pain - APAP/Oxycodone PRN and Celebrex BID     Skin - Patient at risk for skin breakdown due to debility in areas including sacrum, achilles, elbows and head in addition to other sites. Nursing to assess skin daily.      GI Ppx - Patient on Prilosec for GERD prophylaxis. Patient on Senna-docusate for constipation prophylaxis.      DVT Ppx - Patient Lovenox on  transfer.  ____________________________________    IRAIS Burton MD/PhD  [facility] - Physical Medicine & Rehabilitation   [facility] - Brain Injury Medicine   ____________________________________

## 2023-04-19 ENCOUNTER — APPOINTMENT (OUTPATIENT)
Dept: OCCUPATIONAL THERAPY | Facility: REHABILITATION | Age: 73
DRG: 560 | End: 2023-04-19
Attending: PHYSICAL MEDICINE & REHABILITATION
Payer: MEDICARE

## 2023-04-19 ENCOUNTER — APPOINTMENT (OUTPATIENT)
Dept: PHYSICAL THERAPY | Facility: REHABILITATION | Age: 73
DRG: 560 | End: 2023-04-19
Attending: PHYSICAL MEDICINE & REHABILITATION
Payer: MEDICARE

## 2023-04-19 LAB
BACTERIA BLD CULT: NORMAL
ERYTHROCYTE [DISTWIDTH] IN BLOOD BY AUTOMATED COUNT: 45.1 FL (ref 35.9–50)
FERRITIN SERPL-MCNC: 266 NG/ML (ref 10–291)
HCT VFR BLD AUTO: 23.7 % (ref 37–47)
HEMOCCULT SP1 STL QL: NEGATIVE
HGB BLD-MCNC: 7.9 G/DL (ref 12–16)
IRON SATN MFR SERPL: 19 % (ref 15–55)
IRON SERPL-MCNC: 39 UG/DL (ref 40–170)
MCH RBC QN AUTO: 31.5 PG (ref 27–33)
MCHC RBC AUTO-ENTMCNC: 33.3 G/DL (ref 33.6–35)
MCV RBC AUTO: 94.4 FL (ref 81.4–97.8)
PLATELET # BLD AUTO: 288 K/UL (ref 164–446)
PMV BLD AUTO: 9.7 FL (ref 9–12.9)
RBC # BLD AUTO: 2.51 M/UL (ref 4.2–5.4)
SIGNIFICANT IND 70042: NORMAL
SITE SITE: NORMAL
SOURCE SOURCE: NORMAL
TIBC SERPL-MCNC: 207 UG/DL (ref 250–450)
UIBC SERPL-MCNC: 168 UG/DL (ref 110–370)
VIT B12 SERPL-MCNC: 794 PG/ML (ref 211–911)
WBC # BLD AUTO: 9.4 K/UL (ref 4.8–10.8)

## 2023-04-19 PROCEDURE — 83550 IRON BINDING TEST: CPT

## 2023-04-19 PROCEDURE — 700101 HCHG RX REV CODE 250: Performed by: PHYSICAL MEDICINE & REHABILITATION

## 2023-04-19 PROCEDURE — A9270 NON-COVERED ITEM OR SERVICE: HCPCS | Performed by: HOSPITALIST

## 2023-04-19 PROCEDURE — 97530 THERAPEUTIC ACTIVITIES: CPT

## 2023-04-19 PROCEDURE — 97110 THERAPEUTIC EXERCISES: CPT

## 2023-04-19 PROCEDURE — 36415 COLL VENOUS BLD VENIPUNCTURE: CPT

## 2023-04-19 PROCEDURE — 770010 HCHG ROOM/CARE - REHAB SEMI PRIVAT*

## 2023-04-19 PROCEDURE — 83540 ASSAY OF IRON: CPT

## 2023-04-19 PROCEDURE — 97116 GAIT TRAINING THERAPY: CPT

## 2023-04-19 PROCEDURE — 97535 SELF CARE MNGMENT TRAINING: CPT

## 2023-04-19 PROCEDURE — 82728 ASSAY OF FERRITIN: CPT

## 2023-04-19 PROCEDURE — 700102 HCHG RX REV CODE 250 W/ 637 OVERRIDE(OP): Performed by: PHYSICAL MEDICINE & REHABILITATION

## 2023-04-19 PROCEDURE — 700102 HCHG RX REV CODE 250 W/ 637 OVERRIDE(OP): Performed by: HOSPITALIST

## 2023-04-19 PROCEDURE — 99232 SBSQ HOSP IP/OBS MODERATE 35: CPT | Performed by: HOSPITALIST

## 2023-04-19 PROCEDURE — A9270 NON-COVERED ITEM OR SERVICE: HCPCS | Performed by: PHYSICAL MEDICINE & REHABILITATION

## 2023-04-19 PROCEDURE — 99232 SBSQ HOSP IP/OBS MODERATE 35: CPT | Performed by: PHYSICAL MEDICINE & REHABILITATION

## 2023-04-19 PROCEDURE — 700111 HCHG RX REV CODE 636 W/ 250 OVERRIDE (IP): Performed by: PHYSICAL MEDICINE & REHABILITATION

## 2023-04-19 PROCEDURE — 82607 VITAMIN B-12: CPT

## 2023-04-19 PROCEDURE — 85027 COMPLETE CBC AUTOMATED: CPT

## 2023-04-19 RX ORDER — FERROUS SULFATE 325(65) MG
325 TABLET ORAL 2 TIMES DAILY WITH MEALS
Status: DISCONTINUED | OUTPATIENT
Start: 2023-04-19 | End: 2023-04-29 | Stop reason: HOSPADM

## 2023-04-19 RX ADMIN — FERROUS SULFATE TAB 325 MG (65 MG ELEMENTAL FE) 325 MG: 325 (65 FE) TAB at 17:14

## 2023-04-19 RX ADMIN — ENOXAPARIN SODIUM 40 MG: 40 INJECTION SUBCUTANEOUS at 17:14

## 2023-04-19 RX ADMIN — ACETAMINOPHEN 650 MG: 325 TABLET ORAL at 05:15

## 2023-04-19 RX ADMIN — ACETAMINOPHEN 650 MG: 325 TABLET ORAL at 12:49

## 2023-04-19 RX ADMIN — CELECOXIB 200 MG: 100 CAPSULE ORAL at 10:03

## 2023-04-19 RX ADMIN — LIDOCAINE 1 PATCH: 50 PATCH TOPICAL at 19:54

## 2023-04-19 RX ADMIN — ACETAMINOPHEN 650 MG: 325 TABLET ORAL at 17:14

## 2023-04-19 RX ADMIN — OMEPRAZOLE 20 MG: 20 CAPSULE, DELAYED RELEASE ORAL at 10:03

## 2023-04-19 ASSESSMENT — ENCOUNTER SYMPTOMS
NAUSEA: 0
DIZZINESS: 0
BLURRED VISION: 0
HEADACHES: 0
VOMITING: 0
FEVER: 0
SHORTNESS OF BREATH: 0
HALLUCINATIONS: 0
PALPITATIONS: 0

## 2023-04-19 ASSESSMENT — PATIENT HEALTH QUESTIONNAIRE - PHQ9
SUM OF ALL RESPONSES TO PHQ9 QUESTIONS 1 AND 2: 0
2. FEELING DOWN, DEPRESSED, IRRITABLE, OR HOPELESS: NOT AT ALL
1. LITTLE INTEREST OR PLEASURE IN DOING THINGS: NOT AT ALL

## 2023-04-19 ASSESSMENT — GAIT ASSESSMENTS
ASSISTIVE DEVICE: FRONT WHEEL WALKER
DISTANCE (FEET): 80
DEVIATION: ANTALGIC;STEP TO;DECREASED BASE OF SUPPORT;BRADYKINETIC;DECREASED HEEL STRIKE
GAIT LEVEL OF ASSIST: CONTACT GUARD ASSIST

## 2023-04-19 ASSESSMENT — ACTIVITIES OF DAILY LIVING (ADL)
BED_CHAIR_WHEELCHAIR_TRANSFER_DESCRIPTION: INCREASED TIME;SET-UP OF EQUIPMENT;SUPERVISION FOR SAFETY;VERBAL CUEING
BED_CHAIR_WHEELCHAIR_TRANSFER_DESCRIPTION: INCREASED TIME;SET-UP OF EQUIPMENT;SUPERVISION FOR SAFETY;VERBAL CUEING

## 2023-04-19 ASSESSMENT — PAIN DESCRIPTION - PAIN TYPE
TYPE: ACUTE PAIN
TYPE: ACUTE PAIN

## 2023-04-19 NOTE — THERAPY
Occupational Therapy  Daily Treatment     Patient Name: Lorena No  Age:  73 y.o., Sex:  female  Medical Record #: [id number]  Today's Date: 4/19/2023     Precautions  Precautions: Weight Bearing As Tolerated Left Lower Extremity, Fall Risk  Comments: LLE antalgia         Subjective    Pt received from CNA this date with pt getting onto toilet, agreeable to OT session.     Objective       04/19/23 0701   OT Charge Group   OT Self Care / ADL (Units) 2   OT Therapy Activity (Units) 2   OT Total Time Spent   OT Individual Total Time Spent (Mins) 60   Precautions   Precautions Weight Bearing As Tolerated Left Lower Extremity;Fall Risk   Comments LLE antalgia   Functional Level of Assist   Grooming Contact Guard Assist;Standing  (with FWW for oral hygiene)   Upper Body Dressing Supervision   Lower Body Dressing Standby Assist   Toileting Contact Guard Assist   Toilet Transfers Contact Guard Assist   Balance   Comments Pt completed standing bean bag toss activity at FWW with CGA-SBA overall alternating between 1 UE support and no UE support as needed.   Interdisciplinary Plan of Care Collaboration   IDT Collaboration with  Certified Nursing Assistant   Patient Position at End of Therapy Seated;Chair Alarm On;Call Light within Reach;Tray Table within Reach;Phone within Reach   Collaboration Comments re BM during session for collection       Education: Introduced pt to Passport to [place] booklet this session including brief description of each discipline and role played in rehab. Discussed at length goals for OT while in rehab including bathroom transfers, dressing, grooming, kitchen mobility, dc planning including possible DME recommendations, and need for caregiver training prior to dc. Pt receptive to edu provided and stated goal was to be able to take care of animals be able to complete laundry in stacked washer/dryer, and be able to mobilize outside to get from car to step (which is on gravel) and to complete  yard work.    Completed functional mobility at [facility] from back gym to room through main gym with 3 seated RB total.     Assessment    Pt tolerated session well progressing overall with balance, though continues to have limited weight through LLE at this time impacting overall functional mobility. Pt notes that leg feels weak when attempting to stand on it, though wants to be able to reach above head to place clothes into dryer without UE support. Pt cont to be motivated to improve on balance and overall mobility.    Strengths: Able to follow instructions, Alert and oriented, Good insight into deficits/needs, Independent prior level of function, Making steady progress towards goals, Manages pain appropriately, Motivated for self care and independence, Pleasant and cooperative, Supportive family, Willingly participates in therapeutic activities    Plan    Overhead reaching with balance  Kitchen meal task/mobility  Outdoor mobility as able  Funtional mobility and transfers at FWW level  ADLs at FWW level  Pet management for dog and cat cely reaching to ground o  bowls    Occupational Therapy Goals (Active)       Problem: Bathing       Dates: Start: 04/18/23         Goal: STG-Within one week, patient will bathe with CGA overall with AE/DME as needed.       Dates: Start: 04/18/23               Problem: Dressing       Dates: Start: 04/18/23         Goal: STG-Within one week, patient will dress LB with CGA overall with AE/DME as needed.       Dates: Start: 04/18/23               Problem: Functional Transfers       Dates: Start: 04/18/23         Goal: STG-Within one week, patient will transfer to toilet with SBA overall with AE/DME as needed.       Dates: Start: 04/18/23               Problem: IADL's       Dates: Start: 04/18/23         Goal: STG-Within one week, patient will access kitchen area with SBA overall with AE/DME as needed.       Dates: Start: 04/18/23               Problem: OT Long Term Goals       Dates:  Start: 04/18/23         Goal: LTG-By discharge, patient will complete basic self care tasks with mod I overall using AE/DME as needed.       Dates: Start: 04/18/23            Goal: LTG-By discharge, patient will perform bathroom transfers with mod I overall with AE/DME as needed.       Dates: Start: 04/18/23            Goal: LTG-By discharge, patient will complete basic home management with supervision-mod I overall using AE/DME as needed.       Dates: Start: 04/18/23               Problem: Toileting       Dates: Start: 04/18/23         Goal: STG-Within one week, patient will complete toileting tasks with CGA overall with AE/DME as needed.       Dates: Start: 04/18/23

## 2023-04-19 NOTE — THERAPY
Physical Therapy   Daily Treatment     Patient Name: Lorena No  Age:  73 y.o., Sex:  female  Medical Record #: [id number]  Today's Date: 4/19/2023     Precautions  Precautions: Fall Risk, Weight Bearing As Tolerated Left Lower Extremity  Comments: LLE antalgia    Subjective    Patient agreeable to PT; received in w/c in bathroom with tech present.     Objective       04/19/23 0831   PT Charge Group   PT Therapeutic Exercise (Units) 3   PT Therapeutic Activities (Units) 1   PT Total Time Spent   PT Individual Total Time Spent (Mins) 60   Vitals   O2 (LPM) 0   O2 Delivery Device None - Room Air   Transfer Functional Level of Assist   Bed, Chair, Wheelchair Transfer Contact Guard Assist   Bed Chair Wheelchair Transfer Description Increased time;Set-up of equipment;Supervision for safety;Verbal cueing  (FWW)   Sitting Lower Body Exercises   Nustep Resistance Level 2  (10 min active, no breaks but limited by fatigue at end, BLE only, avg 55 spm)   Standing Lower Body Exercises   Standing Lower Body Exercises Yes  (// bars, SBA-CGA, seated breaks q2 sets, increasing prox LLE antalgia throughout progression during SLS tasks)   Hamstring Curl 2 sets of 10   Hip Abduction 2 sets of 10   Marching 2 sets of 10   Heel Rise 2 sets of 15   Toe Rise 2 sets of 15   Bed Mobility    Sit to Stand Contact Guard Assist  (// bars vs FWW)   Interdisciplinary Plan of Care Collaboration   IDT Collaboration with  Therapy Tech;Nursing;Occupational Therapist   Patient Position at End of Therapy Seated;Chair Alarm On;Self Releasing Lap Belt Applied;Call Light within Reach;Tray Table within Reach;Phone within Reach   Collaboration Comments Tech providing toilet transfer A at beginning of session; RN for labs draw; OT for treatment earlier today         Assessment    Patient has improving LLE strength but increasing antalgia with repeated LLE SLS tasks.  Good participation within structured setting.  Patient has mild anxiety with initial LLE  usage but decreased compared with yesterday. Improving activity tolerance.    Strengths: Able to follow instructions, Alert and oriented, Effective communication skills, Good insight into deficits/needs, Independent prior level of function, Making steady progress towards goals, Motivated for self care and independence, Pleasant and cooperative, Willingly participates in therapeutic activities  Barriers: Decreased endurance, Generalized weakness, Home accessibility, Impaired balance, Limited mobility, Pain, Poor family support    Plan    Transfers, weight acceptance, ambulation progression, LE strengthening, dynamic balance    Passport items to be completed:  Get in/out of bed safely, in/out of a vehicle, safely use mobility device, walk or wheel around home/community, navigate up and down stairs, show how to get up/down from the ground, ensure home is accessible, demonstrate HEP, complete caregiver training    Physical Therapy Problems (Active)       Problem: Balance       Dates: Start: 04/18/23         Goal: STG-Within one week, patient will initiate BLE ther ex strengthening towards HEP implementation.       Dates: Start: 04/18/23               Problem: Mobility       Dates: Start: 04/18/23         Goal: STG-Within one week, patient will ambulate 2x 50 feet with FWW at SBA level.       Dates: Start: 04/18/23               Problem: Mobility Transfers       Dates: Start: 04/18/23         Goal: STG-Within one week, patient will perform bed mobility at SBA level including BLE management in/out of bed.       Dates: Start: 04/18/23            Goal: STG-Within one week, patient will sit to stand at SBA level with FWW.       Dates: Start: 04/18/23            Goal: STG-Within one week, patient will transfer bed to chair at SBA level with FWW       Dates: Start: 04/18/23               Problem: PT-Long Term Goals       Dates: Start: 04/18/23         Goal: LTG-By discharge, patient will ambulate 150 feet with FWW at IND level  indoors & outdoors.       Dates: Start: 04/18/23            Goal: LTG-By discharge, patient will transfer one surface to another with FWW at IND level.       Dates: Start: 04/18/23            Goal: LTG-By discharge, patient will perform home exercise program with handout at IND level.       Dates: Start: 04/18/23            Goal: LTG-By discharge, patient will ambulate up/down 4 stairs with B railings at IND level.       Dates: Start: 04/18/23            Goal: LTG-By discharge, patient will transfer in/out of a car with FWW at IND level.       Dates: Start: 04/18/23

## 2023-04-19 NOTE — PROGRESS NOTES
"  Physical Medicine & Rehabilitation Progress Note    Encounter Date: 4/19/2023    Chief Complaint: Decreased mobility, hip pain    Interval Events (Subjective):  Patient sitting up in room. She reports therapy is going well. Reviewed AM labs and anemia with low Fe. Discussed slight improvement in her anemia. Discussed with hospitalist and will consider IV vs PO Fe. Denies NVD. Denies SOB.     Objective:  VITAL SIGNS: /74   Pulse 83   Temp 36.8 °C (98.3 °F) (Oral)   Resp 18   Ht 1.575 m (5' 2\")   Wt 57.1 kg (125 lb 14.4 oz)   SpO2 99%   BMI 23.03 kg/m²   Gen: NAD  Psych: Mood and affect appropriate  CV: RRR, 1+ edema  Resp: CTAB, no upper airway sounds  Abd: NTND  Neuro: AOx4, following commands  Unchanged from 4/18/23    Laboratory Values:  Recent Results (from the past 72 hour(s))   CBC WITHOUT DIFFERENTIAL    Collection Time: 04/16/23  2:01 PM   Result Value Ref Range    WBC 11.3 (H) 4.8 - 10.8 K/uL    RBC 2.51 (L) 4.20 - 5.40 M/uL    Hemoglobin 7.7 (L) 12.0 - 16.0 g/dL    Hematocrit 23.7 (L) 37.0 - 47.0 %    MCV 94.4 81.4 - 97.8 fL    MCH 30.7 27.0 - 33.0 pg    MCHC 32.5 (L) 33.6 - 35.0 g/dL    RDW 44.6 35.9 - 50.0 fL    Platelet Count 197 164 - 446 K/uL    MPV 9.9 9.0 - 12.9 fL   Renal Function Panel    Collection Time: 04/16/23  2:01 PM   Result Value Ref Range    Sodium 139 135 - 145 mmol/L    Potassium 3.9 3.6 - 5.5 mmol/L    Chloride 108 96 - 112 mmol/L    Co2 23 20 - 33 mmol/L    Glucose 120 (H) 65 - 99 mg/dL    Creatinine 0.53 0.50 - 1.40 mg/dL    Bun 13 8 - 22 mg/dL    Calcium 8.3 (L) 8.5 - 10.5 mg/dL    Phosphorus 1.3 (L) 2.5 - 4.5 mg/dL    Albumin 3.2 3.2 - 4.9 g/dL   CORRECTED CALCIUM    Collection Time: 04/16/23  2:01 PM   Result Value Ref Range    Correct Calcium 8.9 8.5 - 10.5 mg/dL   ESTIMATED GFR    Collection Time: 04/16/23  2:01 PM   Result Value Ref Range    GFR (CKD-EPI) 99 >60 mL/min/1.73 m 2   CBC WITHOUT DIFFERENTIAL    Collection Time: 04/17/23  7:13 AM   Result Value Ref " Range    WBC 7.2 4.8 - 10.8 K/uL    RBC 2.30 (L) 4.20 - 5.40 M/uL    Hemoglobin 7.2 (L) 12.0 - 16.0 g/dL    Hematocrit 21.4 (L) 37.0 - 47.0 %    MCV 93.0 81.4 - 97.8 fL    MCH 31.3 27.0 - 33.0 pg    MCHC 33.6 33.6 - 35.0 g/dL    RDW 44.8 35.9 - 50.0 fL    Platelet Count 191 164 - 446 K/uL    MPV 9.8 9.0 - 12.9 fL   Renal Function Panel    Collection Time: 04/17/23  7:13 AM   Result Value Ref Range    Sodium 140 135 - 145 mmol/L    Potassium 3.9 3.6 - 5.5 mmol/L    Chloride 109 96 - 112 mmol/L    Co2 24 20 - 33 mmol/L    Glucose 102 (H) 65 - 99 mg/dL    Creatinine 0.47 (L) 0.50 - 1.40 mg/dL    Bun 9 8 - 22 mg/dL    Calcium 8.4 (L) 8.5 - 10.5 mg/dL    Phosphorus 1.8 (L) 2.5 - 4.5 mg/dL    Albumin 2.9 (L) 3.2 - 4.9 g/dL   CORRECTED CALCIUM    Collection Time: 04/17/23  7:13 AM   Result Value Ref Range    Correct Calcium 9.3 8.5 - 10.5 mg/dL   ESTIMATED GFR    Collection Time: 04/17/23  7:13 AM   Result Value Ref Range    GFR (CKD-EPI) 102 >60 mL/min/1.73 m 2   SARS-CoV-2, PCR (In-House)    Collection Time: 04/17/23  5:00 PM    Specimen: Nasal; Respirate   Result Value Ref Range    SARS-CoV-2 Source NP Swab     SARS-CoV-2 by PCR NotDetected    CBC with Differential    Collection Time: 04/18/23  6:09 AM   Result Value Ref Range    WBC 7.4 4.8 - 10.8 K/uL    RBC 2.24 (L) 4.20 - 5.40 M/uL    Hemoglobin 7.0 (L) 12.0 - 16.0 g/dL    Hematocrit 20.8 (L) 37.0 - 47.0 %    MCV 92.9 81.4 - 97.8 fL    MCH 31.3 27.0 - 33.0 pg    MCHC 33.7 33.6 - 35.0 g/dL    RDW 44.1 35.9 - 50.0 fL    Platelet Count 218 164 - 446 K/uL    MPV 9.8 9.0 - 12.9 fL    Neutrophils-Polys 47.30 44.00 - 72.00 %    Lymphocytes 35.50 22.00 - 41.00 %    Monocytes 11.30 0.00 - 13.40 %    Eosinophils 4.80 0.00 - 6.90 %    Basophils 0.70 0.00 - 1.80 %    Immature Granulocytes 0.40 0.00 - 0.90 %    Nucleated RBC 0.00 /100 WBC    Neutrophils (Absolute) 3.49 2.00 - 7.15 K/uL    Lymphs (Absolute) 2.61 1.00 - 4.80 K/uL    Monos (Absolute) 0.83 0.00 - 0.85 K/uL    Eos  (Absolute) 0.35 0.00 - 0.51 K/uL    Baso (Absolute) 0.05 0.00 - 0.12 K/uL    Immature Granulocytes (abs) 0.03 0.00 - 0.11 K/uL    NRBC (Absolute) 0.00 K/uL   Comp Metabolic Panel (CMP)    Collection Time: 04/18/23  6:09 AM   Result Value Ref Range    Sodium 140 135 - 145 mmol/L    Potassium 4.1 3.6 - 5.5 mmol/L    Chloride 109 96 - 112 mmol/L    Co2 23 20 - 33 mmol/L    Anion Gap 8.0 7.0 - 16.0    Glucose 94 65 - 99 mg/dL    Bun 10 8 - 22 mg/dL    Creatinine 0.51 0.50 - 1.40 mg/dL    Calcium 8.4 (L) 8.5 - 10.5 mg/dL    AST(SGOT) 45 12 - 45 U/L    ALT(SGPT) 18 2 - 50 U/L    Alkaline Phosphatase 51 30 - 99 U/L    Total Bilirubin 0.6 0.1 - 1.5 mg/dL    Albumin 3.1 (L) 3.2 - 4.9 g/dL    Total Protein 5.5 (L) 6.0 - 8.2 g/dL    Globulin 2.4 1.9 - 3.5 g/dL    A-G Ratio 1.3 g/dL   HEMOGLOBIN A1C    Collection Time: 04/18/23  6:09 AM   Result Value Ref Range    Glycohemoglobin 5.2 4.0 - 5.6 %    Est Avg Glucose 103 mg/dL   TSH with Reflex to FT4    Collection Time: 04/18/23  6:09 AM   Result Value Ref Range    TSH 3.850 0.380 - 5.330 uIU/mL   Vitamin D, 25-hydroxy (blood)    Collection Time: 04/18/23  6:09 AM   Result Value Ref Range    25-Hydroxy   Vitamin D 25 38 30 - 100 ng/mL   CREATINE KINASE    Collection Time: 04/18/23  6:09 AM   Result Value Ref Range    CPK Total 728 (H) 0 - 154 U/L   MAGNESIUM    Collection Time: 04/18/23  6:09 AM   Result Value Ref Range    Magnesium 1.6 1.5 - 2.5 mg/dL   PHOSPHORUS    Collection Time: 04/18/23  6:09 AM   Result Value Ref Range    Phosphorus 3.2 2.5 - 4.5 mg/dL   CORRECTED CALCIUM    Collection Time: 04/18/23  6:09 AM   Result Value Ref Range    Correct Calcium 9.1 8.5 - 10.5 mg/dL   ESTIMATED GFR    Collection Time: 04/18/23  6:09 AM   Result Value Ref Range    GFR (CKD-EPI) 100 >60 mL/min/1.73 m 2   FERRITIN    Collection Time: 04/19/23  6:25 AM   Result Value Ref Range    Ferritin 266.0 10.0 - 291.0 ng/mL   IRON/TOTAL IRON BIND    Collection Time: 04/19/23  6:25 AM   Result  Value Ref Range    Iron 39 (L) 40 - 170 ug/dL    Total Iron Binding 207 (L) 250 - 450 ug/dL    Unsat Iron Binding 168 110 - 370 ug/dL    % Saturation 19 15 - 55 %   VITAMIN B12    Collection Time: 04/19/23  6:25 AM   Result Value Ref Range    Vitamin B12 -True Cobalamin 794 211 - 911 pg/mL   CBC WITHOUT DIFFERENTIAL    Collection Time: 04/19/23  8:30 AM   Result Value Ref Range    WBC 9.4 4.8 - 10.8 K/uL    RBC 2.51 (L) 4.20 - 5.40 M/uL    Hemoglobin 7.9 (L) 12.0 - 16.0 g/dL    Hematocrit 23.7 (L) 37.0 - 47.0 %    MCV 94.4 81.4 - 97.8 fL    MCH 31.5 27.0 - 33.0 pg    MCHC 33.3 (L) 33.6 - 35.0 g/dL    RDW 45.1 35.9 - 50.0 fL    Platelet Count 288 164 - 446 K/uL    MPV 9.7 9.0 - 12.9 fL       Medications:  Scheduled Medications   Medication Dose Frequency    Pharmacy Consult Request  1 Each PHARMACY TO DOSE    senna-docusate  2 Tablet BID    omeprazole  20 mg DAILY    acetaminophen  650 mg Q6HRS    enoxaparin (LOVENOX) injection  40 mg DAILY AT 1800    lidocaine  1 Patch Q24HR     PRN medications: Respiratory Therapy Consult, hydrALAZINE, senna-docusate **AND** polyethylene glycol/lytes **AND** magnesium hydroxide **AND** bisacodyl, mag hydrox-al hydrox-simeth, ondansetron **OR** ondansetron, traZODone, sodium chloride, oxyCODONE immediate-release **OR** oxyCODONE immediate-release, [COMPLETED] celecoxib **FOLLOWED BY** celecoxib, [START ON 4/20/2023] acetaminophen    Diet:  Current Diet Order   Procedures    Diet Order Diet: Level 6 - Soft and Bite Sized; Liquid level: Level 0 - Thin       Medical Decision Making and Plan:  Left hip fracture - Patient with fall on 4/13/23 with severe hip pain found to have fracture s/p IMN with Dr. Isidro on 4/14/23. Patient is WBAT.  -PT and OT for mobility and ADLs. Per guidelines, 15 hours per week between PT, OT and/or SLP.  -Follow-up Dr. Isidro.      Anemia - Check AM CBC - 7.0. Severe worsening anemia puts patient at high risk for further decline including respiratory  failure and shock. Consult hospitalist  -Recheck CBC, Fe panel, FOBT. Low Fe, ongoing severe anemia.      Leukocytosis - Check AM CBC. Most likely reactionary - improved     Azotemia - Check AM CMP - resolved     Hypocalcemia - Check AM CMP - 9.1, improved.      Hypophosphatemia - Check AM Mag/Phosph - improved. Not on supplements     Rhabdomyolysis - Check AM CPK - 728 trending down.     Pain - APAP/Oxycodone PRN and Celebrex BID. Discontinue scheduled Celebrex, PRN now available.     Skin - Patient at risk for skin breakdown due to debility in areas including sacrum, achilles, elbows and head in addition to other sites. Nursing to assess skin daily.      GI Ppx - Patient on Prilosec for GERD prophylaxis. Patient on Senna-docusate for constipation prophylaxis.      DVT Ppx - Patient Lovenox on transfer.  ____________________________________    T. Rolando Burton MD/PhD  [facility]   [facility]   ____________________________________

## 2023-04-19 NOTE — THERAPY
Occupational Therapy  Daily Treatment     Patient Name: Lorena No  Age:  73 y.o., Sex:  female  Medical Record #: [id number]  Today's Date: 4/19/2023     Precautions  Precautions: (P) Fall Risk, Weight Bearing As Tolerated Left Lower Extremity  Comments: (P) LLE antalgia         Subjective    Pt pleasant and agreeable to OT session.      Objective       04/19/23 1001   OT Charge Group   OT Therapy Activity (Units) 2   OT Total Time Spent   OT Individual Total Time Spent (Mins) 30   Precautions   Precautions Fall Risk;Weight Bearing As Tolerated Left Lower Extremity   Comments LLE antalgia   Cognition    Level of Consciousness Alert   IADL Treatments   Kitchen Mobility Education Pt educated on safe kitchen mobility techniques from FWW level. Pt retrieved items from cupboards, appliances, countertop at various heights w/ CGA-SBA. Pt also unloaded/loaded  standing at countertop. Educated pt on safety strategies, including placement of FWW, keeping frequently used items within easy reach, use of countertop for UB support and to transport items around kitchen, completing portions of cooking tasks while seated for pain mgt/energy conservation.   Interdisciplinary Plan of Care Collaboration   Patient Position at End of Therapy Seated;Chair Alarm On;Call Light within Reach;Tray Table within Reach;Phone within Reach;Self Releasing Lap Belt Applied         Assessment    Pt tolerated OT session well and demonstrated good safety with kitchen mobility task from FWW level. She reports that she would like to complete simple meal prep and laundry tasks during future OT session. Pt con't to be limited by pain with LLE WB.   Strengths: Able to follow instructions, Alert and oriented, Good insight into deficits/needs, Independent prior level of function, Making steady progress towards goals, Manages pain appropriately, Motivated for self care and independence, Pleasant and cooperative, Supportive family, Willingly  participates in therapeutic activities    Plan    Overhead reaching with balance  Simple cooking task, laundry  Outdoor mobility as able  Funtional mobility and transfers at FWW level  ADLs at FWW level  Pet management for dog and cat cely reaching to ground o  bowls    Occupational Therapy Goals (Active)       Problem: Bathing       Dates: Start: 04/18/23         Goal: STG-Within one week, patient will bathe with CGA overall with AE/DME as needed.       Dates: Start: 04/18/23               Problem: Dressing       Dates: Start: 04/18/23         Goal: STG-Within one week, patient will dress LB with CGA overall with AE/DME as needed.       Dates: Start: 04/18/23               Problem: Functional Transfers       Dates: Start: 04/18/23         Goal: STG-Within one week, patient will transfer to toilet with SBA overall with AE/DME as needed.       Dates: Start: 04/18/23               Problem: IADL's       Dates: Start: 04/18/23         Goal: STG-Within one week, patient will access kitchen area with SBA overall with AE/DME as needed.       Dates: Start: 04/18/23               Problem: OT Long Term Goals       Dates: Start: 04/18/23         Goal: LTG-By discharge, patient will complete basic self care tasks with mod I overall using AE/DME as needed.       Dates: Start: 04/18/23            Goal: LTG-By discharge, patient will perform bathroom transfers with mod I overall with AE/DME as needed.       Dates: Start: 04/18/23            Goal: LTG-By discharge, patient will complete basic home management with supervision-mod I overall using AE/DME as needed.       Dates: Start: 04/18/23               Problem: Toileting       Dates: Start: 04/18/23         Goal: STG-Within one week, patient will complete toileting tasks with CGA overall with AE/DME as needed.       Dates: Start: 04/18/23

## 2023-04-19 NOTE — THERAPY
"Physical Therapy   Daily Treatment     Patient Name: Lorena No  Age:  73 y.o., Sex:  female  Medical Record #: [id number]  Today's Date: 4/19/2023     Precautions  Precautions: Fall Risk, Weight Bearing As Tolerated Left Lower Extremity  Comments: LLE antalgia    Subjective    Patient reports having a low grade fever; received sitting in w/c at bedside; agreeable to PT.     Objective       04/19/23 1431   PT Charge Group   PT Gait Training (Units) 1   PT Therapeutic Activities (Units) 1   PT Total Time Spent   PT Individual Total Time Spent (Mins) 30   Vitals   O2 (LPM) 0   O2 Delivery Device None - Room Air   Gait Functional Level of Assist    Gait Level Of Assist Contact Guard Assist   Assistive Device Front Wheel Walker   Distance (Feet) 80   # of Times Distance was Traveled 1   Deviation Antalgic;Step To;Decreased Base Of Support;Bradykinetic;Decreased Heel Strike  (continues to use compensatory BUE support, impaired but improving RLE step length, impaired but improving L foot clearance)   Wheelchair Functional Level of Assist   Wheelchair Description   (pt still using BUE to assist LLE to/from w/c legrest)   Stairs Functional Level of Assist   Level of Assist with Stairs Minimal Assist   # of Stairs Climbed   (2 sets of 6 adaptive/4\" stairs)   Stairs Description Extra time;Hand rails;Limited by fatigue;Safety concerns;Supervision for safety;Verbal cueing   Transfer Functional Level of Assist   Bed, Chair, Wheelchair Transfer Contact Guard Assist  (w/c to EOB)   Bed Chair Wheelchair Transfer Description Increased time;Set-up of equipment;Supervision for safety;Verbal cueing  (FWW)   Bed Mobility    Sit to Supine Contact Guard Assist   Sit to Stand Contact Guard Assist   Scooting Contact Guard Assist   Interdisciplinary Plan of Care Collaboration   IDT Collaboration with  Nursing   Patient Position at End of Therapy In Bed;Bed Alarm On;Call Light within Reach;Tray Table within Reach;Phone within Reach "   Collaboration Comments CLOF         Assessment    Patient has improving stair performance, ambulation endurance, and self-LE management with sit to supine; patient uses step-to pattern with most of stairs today but varies leading LE throughout; antalgia at LLE continues but decreased compared with morning.    Strengths: Able to follow instructions, Alert and oriented, Effective communication skills, Good insight into deficits/needs, Independent prior level of function, Making steady progress towards goals, Motivated for self care and independence, Pleasant and cooperative, Willingly participates in therapeutic activities  Barriers: Decreased endurance, Generalized weakness, Home accessibility, Impaired balance, Limited mobility, Pain, Poor family support    Plan    Transfers, weight acceptance, ambulation progression, LE strengthening, dynamic balance    Passport items to be completed:  Get in/out of bed safely, in/out of a vehicle, safely use mobility device, walk or wheel around home/community, navigate up and down stairs, show how to get up/down from the ground, ensure home is accessible, demonstrate HEP, complete caregiver training    Physical Therapy Problems (Active)       Problem: Balance       Dates: Start: 04/18/23         Goal: STG-Within one week, patient will initiate BLE ther ex strengthening towards HEP implementation.       Dates: Start: 04/18/23               Problem: Mobility       Dates: Start: 04/18/23         Goal: STG-Within one week, patient will ambulate 2x 50 feet with FWW at SBA level.       Dates: Start: 04/18/23               Problem: Mobility Transfers       Dates: Start: 04/18/23         Goal: STG-Within one week, patient will perform bed mobility at SBA level including BLE management in/out of bed.       Dates: Start: 04/18/23            Goal: STG-Within one week, patient will sit to stand at SBA level with FWW.       Dates: Start: 04/18/23            Goal: STG-Within one week,  patient will transfer bed to chair at SBA level with FWW       Dates: Start: 04/18/23               Problem: PT-Long Term Goals       Dates: Start: 04/18/23         Goal: LTG-By discharge, patient will ambulate 150 feet with FWW at IND level indoors & outdoors.       Dates: Start: 04/18/23            Goal: LTG-By discharge, patient will transfer one surface to another with FWW at IND level.       Dates: Start: 04/18/23            Goal: LTG-By discharge, patient will perform home exercise program with handout at IND level.       Dates: Start: 04/18/23            Goal: LTG-By discharge, patient will ambulate up/down 4 stairs with B railings at IND level.       Dates: Start: 04/18/23            Goal: LTG-By discharge, patient will transfer in/out of a car with FWW at IND level.       Dates: Start: 04/18/23

## 2023-04-19 NOTE — CARE PLAN
Problem: Self Care  Goal: Patient will have the ability to perform ADLs independently or with assistance  Outcome: Progressing  Note: Patient able to perform regular activities this shift.  Pain controlled this shift.  Pain management includes PRN pain meds as well as non-pharmacological measures such as emotional support, rest, and repositioning.  Will continue to monitor.    The patient is Stable - Low risk of patient condition declining or worsening    Shift Goals  Clinical Goals: pain managment/safety  Patient Goals: pain management    Progress made toward(s) clinical / shift goals:  pt participates with therapy and is cooperative with nursing    Patient is not progressing towards the following goals:

## 2023-04-19 NOTE — PROGRESS NOTES
[facility] Medicine Daily Progress Note    Date of Service  4/19/2023    Chief Complaint:  Anemia with acuter blood loss.    Interval History:  Discussed about he H&H improved this am and will not have to give a blood transfusion.    Review of Systems  Review of Systems   Constitutional:  Negative for fever.   Eyes:  Negative for blurred vision.   Respiratory:  Negative for shortness of breath.    Cardiovascular:  Negative for palpitations.   Gastrointestinal:  Negative for nausea and vomiting.   Neurological:  Negative for dizziness and headaches.   Psychiatric/Behavioral:  Negative for hallucinations.       Physical Exam  Temp:  [36.7 °C (98.1 °F)-36.8 °C (98.3 °F)] 36.8 °C (98.3 °F)  Pulse:  [83-90] 83  Resp:  [16-18] 18  BP: (111-156)/(62-74) 132/74  SpO2:  [94 %-99 %] 99 %    Physical Exam  Vitals and nursing note reviewed.   Constitutional:       General: She is not in acute distress.  HENT:      Mouth/Throat:      Mouth: Mucous membranes are moist.      Pharynx: Oropharynx is clear.   Eyes:      General: No scleral icterus.  Neck:      Vascular: No JVD.   Cardiovascular:      Rate and Rhythm: Normal rate and regular rhythm.      Heart sounds: Normal heart sounds. No murmur heard.  Pulmonary:      Effort: Pulmonary effort is normal.      Breath sounds: Normal breath sounds. No stridor.   Abdominal:      General: There is no distension.      Palpations: Abdomen is soft.      Tenderness: There is no abdominal tenderness.   Musculoskeletal:      Cervical back: No rigidity.      Right lower leg: No edema.      Left lower leg: Edema present.      Comments: Hs left upper leg swelling.   Skin:     General: Skin is warm and dry.      Findings: No rash.   Neurological:      Mental Status: She is alert and oriented to person, place, and time.   Psychiatric:         Mood and Affect: Mood normal.         Behavior: Behavior normal.       Fluids    Intake/Output Summary (Last 24 hours) at 4/19/2023 1045  Last data filed at  4/19/2023 0900  Gross per 24 hour   Intake 840 ml   Output 350 ml   Net 490 ml       Laboratory  Recent Labs     04/17/23  0713 04/18/23  0609 04/19/23  0830   WBC 7.2 7.4 9.4   RBC 2.30* 2.24* 2.51*   HEMOGLOBIN 7.2* 7.0* 7.9*   HEMATOCRIT 21.4* 20.8* 23.7*   MCV 93.0 92.9 94.4   MCH 31.3 31.3 31.5   MCHC 33.6 33.7 33.3*   RDW 44.8 44.1 45.1   PLATELETCT 191 218 288   MPV 9.8 9.8 9.7     Recent Labs     04/16/23  1401 04/17/23  0713 04/18/23  0609   SODIUM 139 140 140   POTASSIUM 3.9 3.9 4.1   CHLORIDE 108 109 109   CO2 23 24 23   GLUCOSE 120* 102* 94   BUN 13 9 10   CREATININE 0.53 0.47* 0.51   CALCIUM 8.3* 8.4* 8.4*                   Imaging    Assessment/Plan  * Hip fracture requiring operative repair, left, closed, initial encounter ([facility])- (present on admission)  Assessment & Plan  2nd to [facility]  S/P surgical repair    Acute blood loss anemia  Assessment & Plan  Hb: 11.7 (4/14) --> 9.4 (4/15) --> 7.7 (4/16) --> 7.2 (4/17) --> 7.0 (4/18) --> 7.9 (4/19)  H&H was ok before surgery  Has left upper left swelling   Recent loss likely 2nd to surgery with post-op bleed  Fe: 39, sats 19%  B12: wnl  F/U SFOB  Will start Fe supplements  Monitor for any ongoing bleeding

## 2023-04-19 NOTE — CARE PLAN
The patient is Watcher - Medium risk of patient condition declining or worsening    Shift Goals  Clinical Goals: pain managment/safety  Patient Goals: pain management    Progress made toward(s) clinical / shift goals:        Problem: Knowledge Deficit - Standard  Goal: Patient and family/care givers will demonstrate understanding of plan of care, disease process/condition, diagnostic tests and medications  Outcome: Progressing, reviewed hs medications and plan of care with patient. Pt agreeable and expresses an understanding.      Problem: Pain - Standard  Goal: Alleviation of pain or a reduction in pain to the patient’s comfort goal  Outcome: Progressing, pt resting comfortably, standing tylenol provided.

## 2023-04-20 ENCOUNTER — APPOINTMENT (OUTPATIENT)
Dept: PHYSICAL THERAPY | Facility: REHABILITATION | Age: 73
DRG: 560 | End: 2023-04-20
Attending: PHYSICAL MEDICINE & REHABILITATION
Payer: MEDICARE

## 2023-04-20 ENCOUNTER — APPOINTMENT (OUTPATIENT)
Dept: OCCUPATIONAL THERAPY | Facility: REHABILITATION | Age: 73
DRG: 560 | End: 2023-04-20
Attending: PHYSICAL MEDICINE & REHABILITATION
Payer: MEDICARE

## 2023-04-20 LAB
APPEARANCE UR: CLEAR
BACTERIA #/AREA URNS HPF: NEGATIVE /HPF
BACTERIA BLD CULT: NORMAL
BILIRUB UR QL STRIP.AUTO: NEGATIVE
COLOR UR: YELLOW
EPI CELLS #/AREA URNS HPF: NEGATIVE /HPF
GLUCOSE UR STRIP.AUTO-MCNC: NEGATIVE MG/DL
HEMOCCULT STL QL: NEGATIVE
HYALINE CASTS #/AREA URNS LPF: NORMAL /LPF
KETONES UR STRIP.AUTO-MCNC: NEGATIVE MG/DL
LEUKOCYTE ESTERASE UR QL STRIP.AUTO: ABNORMAL
MICRO URNS: ABNORMAL
NITRITE UR QL STRIP.AUTO: NEGATIVE
PH UR STRIP.AUTO: 6.5 [PH] (ref 5–8)
PROT UR QL STRIP: NEGATIVE MG/DL
RBC # URNS HPF: NORMAL /HPF
RBC UR QL AUTO: NEGATIVE
SIGNIFICANT IND 70042: NORMAL
SITE SITE: NORMAL
SOURCE SOURCE: NORMAL
SP GR UR STRIP.AUTO: 1.01
UROBILINOGEN UR STRIP.AUTO-MCNC: 1 MG/DL
WBC #/AREA URNS HPF: NORMAL /HPF

## 2023-04-20 PROCEDURE — 700102 HCHG RX REV CODE 250 W/ 637 OVERRIDE(OP): Performed by: HOSPITALIST

## 2023-04-20 PROCEDURE — 81001 URINALYSIS AUTO W/SCOPE: CPT

## 2023-04-20 PROCEDURE — A9270 NON-COVERED ITEM OR SERVICE: HCPCS | Performed by: PHYSICAL MEDICINE & REHABILITATION

## 2023-04-20 PROCEDURE — 700101 HCHG RX REV CODE 250: Performed by: PHYSICAL MEDICINE & REHABILITATION

## 2023-04-20 PROCEDURE — 82272 OCCULT BLD FECES 1-3 TESTS: CPT

## 2023-04-20 PROCEDURE — 97116 GAIT TRAINING THERAPY: CPT

## 2023-04-20 PROCEDURE — 99232 SBSQ HOSP IP/OBS MODERATE 35: CPT | Performed by: PHYSICAL MEDICINE & REHABILITATION

## 2023-04-20 PROCEDURE — 97530 THERAPEUTIC ACTIVITIES: CPT | Mod: CQ

## 2023-04-20 PROCEDURE — 700102 HCHG RX REV CODE 250 W/ 637 OVERRIDE(OP): Performed by: PHYSICAL MEDICINE & REHABILITATION

## 2023-04-20 PROCEDURE — 97110 THERAPEUTIC EXERCISES: CPT

## 2023-04-20 PROCEDURE — A9270 NON-COVERED ITEM OR SERVICE: HCPCS | Performed by: HOSPITALIST

## 2023-04-20 PROCEDURE — 770010 HCHG ROOM/CARE - REHAB SEMI PRIVAT*

## 2023-04-20 PROCEDURE — 700111 HCHG RX REV CODE 636 W/ 250 OVERRIDE (IP): Performed by: PHYSICAL MEDICINE & REHABILITATION

## 2023-04-20 PROCEDURE — 97535 SELF CARE MNGMENT TRAINING: CPT

## 2023-04-20 PROCEDURE — 99232 SBSQ HOSP IP/OBS MODERATE 35: CPT | Performed by: HOSPITALIST

## 2023-04-20 RX ORDER — TIZANIDINE 4 MG/1
2 TABLET ORAL EVERY 6 HOURS PRN
Status: DISCONTINUED | OUTPATIENT
Start: 2023-04-20 | End: 2023-04-29 | Stop reason: HOSPADM

## 2023-04-20 RX ADMIN — SENNOSIDES AND DOCUSATE SODIUM 2 TABLET: 50; 8.6 TABLET ORAL at 07:51

## 2023-04-20 RX ADMIN — SENNOSIDES AND DOCUSATE SODIUM 2 TABLET: 50; 8.6 TABLET ORAL at 19:50

## 2023-04-20 RX ADMIN — ACETAMINOPHEN 650 MG: 325 TABLET ORAL at 07:50

## 2023-04-20 RX ADMIN — ENOXAPARIN SODIUM 40 MG: 40 INJECTION SUBCUTANEOUS at 17:10

## 2023-04-20 RX ADMIN — ACETAMINOPHEN 650 MG: 325 TABLET ORAL at 01:25

## 2023-04-20 RX ADMIN — FERROUS SULFATE TAB 325 MG (65 MG ELEMENTAL FE) 325 MG: 325 (65 FE) TAB at 07:50

## 2023-04-20 RX ADMIN — OMEPRAZOLE 20 MG: 20 CAPSULE, DELAYED RELEASE ORAL at 07:51

## 2023-04-20 RX ADMIN — FERROUS SULFATE TAB 325 MG (65 MG ELEMENTAL FE) 325 MG: 325 (65 FE) TAB at 17:09

## 2023-04-20 RX ADMIN — LIDOCAINE 1 PATCH: 50 PATCH TOPICAL at 19:45

## 2023-04-20 RX ADMIN — ACETAMINOPHEN 650 MG: 325 TABLET ORAL at 19:48

## 2023-04-20 ASSESSMENT — ENCOUNTER SYMPTOMS
NERVOUS/ANXIOUS: 0
COUGH: 0
DIZZINESS: 0
FEVER: 0
BLURRED VISION: 0
DIARRHEA: 0

## 2023-04-20 ASSESSMENT — GAIT ASSESSMENTS
DEVIATION: ANTALGIC;STEP TO;DECREASED BASE OF SUPPORT;BRADYKINETIC;DECREASED HEEL STRIKE
DISTANCE (FEET): 150
ASSISTIVE DEVICE: FRONT WHEEL WALKER
GAIT LEVEL OF ASSIST: STANDBY ASSIST
ASSISTIVE DEVICE: FRONT WHEEL WALKER
GAIT LEVEL OF ASSIST: CONTACT GUARD ASSIST
DEVIATION: ANTALGIC;BRADYKINETIC
ASSISTIVE DEVICE: FRONT WHEEL WALKER
GAIT LEVEL OF ASSIST: CONTACT GUARD ASSIST
DEVIATION: ANTALGIC;STEP TO
DISTANCE (FEET): 125

## 2023-04-20 ASSESSMENT — PAIN DESCRIPTION - PAIN TYPE: TYPE: ACUTE PAIN

## 2023-04-20 ASSESSMENT — PATIENT HEALTH QUESTIONNAIRE - PHQ9
1. LITTLE INTEREST OR PLEASURE IN DOING THINGS: NOT AT ALL
SUM OF ALL RESPONSES TO PHQ9 QUESTIONS 1 AND 2: 0
2. FEELING DOWN, DEPRESSED, IRRITABLE, OR HOPELESS: NOT AT ALL

## 2023-04-20 ASSESSMENT — ACTIVITIES OF DAILY LIVING (ADL): BED_CHAIR_WHEELCHAIR_TRANSFER_DESCRIPTION: ADAPTIVE EQUIPMENT;INCREASED TIME;SUPERVISION FOR SAFETY;VERBAL CUEING

## 2023-04-20 NOTE — PROGRESS NOTES
[facility] Medicine Daily Progress Note    Date of Service  4/20/2023    Chief Complaint:  Anemia with acuter blood loss.    Interval History:  Discussed about checking her H&H in the am.    Review of Systems  Review of Systems   Constitutional:  Negative for fever.   Eyes:  Negative for blurred vision.   Respiratory:  Negative for cough.    Cardiovascular:  Negative for chest pain.   Gastrointestinal:  Negative for diarrhea.   Musculoskeletal:  Negative for joint pain.   Neurological:  Negative for dizziness.   Psychiatric/Behavioral:  The patient is not nervous/anxious.       Physical Exam  Temp:  [36.8 °C (98.3 °F)-37.6 °C (99.7 °F)] 37.5 °C (99.5 °F)  Pulse:  [85-99] 85  Resp:  [16-18] 16  BP: (136-149)/(57-75) 149/75  SpO2:  [95 %-97 %] 95 %    Physical Exam  Vitals and nursing note reviewed.   Constitutional:       Appearance: She is not diaphoretic.   HENT:      Mouth/Throat:      Pharynx: No oropharyngeal exudate or posterior oropharyngeal erythema.   Eyes:      Extraocular Movements: Extraocular movements intact.   Neck:      Vascular: No carotid bruit or JVD.   Cardiovascular:      Rate and Rhythm: Normal rate and regular rhythm.      Heart sounds: Normal heart sounds. No murmur heard.  Pulmonary:      Effort: Pulmonary effort is normal.      Breath sounds: Normal breath sounds. No stridor.   Abdominal:      General: Bowel sounds are normal.      Palpations: Abdomen is soft.   Musculoskeletal:      Right lower leg: No edema.      Left lower leg: Edema present.      Comments: Hs left upper leg swelling.   Skin:     General: Skin is warm and dry.      Findings: No rash.   Neurological:      Mental Status: She is alert and oriented to person, place, and time.   Psychiatric:         Mood and Affect: Mood normal.         Behavior: Behavior normal.       Fluids    Intake/Output Summary (Last 24 hours) at 4/20/2023 1010  Last data filed at 4/20/2023 0834  Gross per 24 hour   Intake 1080 ml   Output --   Net 1080 ml          Laboratory  Recent Labs     04/18/23  0609 04/19/23  0830   WBC 7.4 9.4   RBC 2.24* 2.51*   HEMOGLOBIN 7.0* 7.9*   HEMATOCRIT 20.8* 23.7*   MCV 92.9 94.4   MCH 31.3 31.5   MCHC 33.7 33.3*   RDW 44.1 45.1   PLATELETCT 218 288   MPV 9.8 9.7       Recent Labs     04/18/23  0609   SODIUM 140   POTASSIUM 4.1   CHLORIDE 109   CO2 23   GLUCOSE 94   BUN 10   CREATININE 0.51   CALCIUM 8.4*                     Imaging    Assessment/Plan  * Hip fracture requiring operative repair, left, closed, initial encounter (HCC)- (present on admission)  Assessment & Plan  2nd to [facility]  S/P surgical repair    Acute blood loss anemia  Assessment & Plan  Hb: 11.7 (4/14) --> 9.4 (4/15) --> 7.7 (4/16) --> 7.2 (4/17) --> 7.0 (4/18) --> 7.9 (4/19)  H&H was ok before surgery  Has left upper left swelling   Recent loss likely 2nd to surgery with post-op bleed  Fe: 39, sats 19%  B12: wnl  SFOB (-) x 1  Cont Fe supplements  Monitor for any ongoing bleeding

## 2023-04-20 NOTE — THERAPY
"Physical Therapy   Daily Treatment     Patient Name: Lorena No  Age:  73 y.o., Sex:  female  Medical Record #: [id number]  Today's Date: 4/20/2023     Precautions  Precautions: Fall Risk, Weight Bearing As Tolerated Left Lower Extremity  Comments: LLE antalgia    Subjective    Pt seated EOB upon arrival, agreeable to session.  \"I was really active and was doing a lot of yard work\" pt was tearing up during session, easily redirect and education on focusing present     Objective       04/20/23 0701   PT Charge Group   PT Gait Training (Units) 1   PT Therapeutic Exercise (Units) 2   PT Therapeutic Activities (Units) 1   Supervising Physical Therapist Christos Antoine   PT Total Time Spent   PT Individual Total Time Spent (Mins) 60   Pain 0 - 10 Group   Location Hip   Location Orientation Left   Therapist Pain Assessment During Activity;Nurse Notified   Cognition    Level of Consciousness Alert   Gait Functional Level of Assist    Gait Level Of Assist Contact Guard Assist   Assistive Device Front Wheel Walker   Distance (Feet) 150   # of Times Distance was Traveled 1   Deviation Antalgic;Step To;Decreased Base Of Support;Bradykinetic;Decreased Heel Strike   Transfer Functional Level of Assist   Bed, Chair, Wheelchair Transfer Contact Guard Assist   Bed Chair Wheelchair Transfer Description Adaptive equipment;Increased time;Supervision for safety;Verbal cueing  (FWW)   Supine Lower Body Exercise   Supine Lower Body Exercises   (issued supine LE HEP)   Hip Abduction 1 set of 10;Left   Hip Adduction  1 set of 10;Bilateral   Short Arc Quad 1 set of 10;Left   Heel Slide 1 set of 10;Left   Ankle Pumps 1 set of 10;Bilateral   Gluteal Isometrics 1 set of 10;Bilateral   Quadriceps Isometrics 1 set of 10;Left   Bed Mobility    Supine to Sit Standby Assist   Sit to Supine Contact Guard Assist   Sit to Stand Contact Guard Assist   Scooting Standby Assist   Interdisciplinary Plan of Care Collaboration   IDT Collaboration with  Nursing "   Collaboration Comments pain med     Completed UB dressing IND, PTA assisted w/ LB dressing.    Assessment    Pt tolerated session well, required AAROM for supine therex d/t pain and LLE weakness. Is motivated to progress in therapy and pleasant throughout session. Progressing in ambulation distance, RN delivered meds at the end of session.    Strengths: Able to follow instructions, Alert and oriented, Effective communication skills, Good insight into deficits/needs, Independent prior level of function, Making steady progress towards goals, Motivated for self care and independence, Pleasant and cooperative, Willingly participates in therapeutic activities  Barriers: Decreased endurance, Generalized weakness, Home accessibility, Impaired balance, Limited mobility, Pain, Poor family support    Plan    Transfers, weight acceptance, ambulation progression, LE strengthening, dynamic balance     Passport items to be completed:  Get in/out of bed safely, in/out of a vehicle, safely use mobility device, walk or wheel around home/community, navigate up and down stairs, show how to get up/down from the ground, ensure home is accessible, demonstrate HEP, complete caregiver training    Physical Therapy Problems (Active)       Problem: Balance       Dates: Start: 04/18/23         Goal: STG-Within one week, patient will initiate BLE ther ex strengthening towards HEP implementation.       Dates: Start: 04/18/23               Problem: Mobility       Dates: Start: 04/18/23         Goal: STG-Within one week, patient will ambulate 2x 50 feet with FWW at SBA level.       Dates: Start: 04/18/23               Problem: Mobility Transfers       Dates: Start: 04/18/23         Goal: STG-Within one week, patient will perform bed mobility at SBA level including BLE management in/out of bed.       Dates: Start: 04/18/23            Goal: STG-Within one week, patient will sit to stand at SBA level with FWW.       Dates: Start: 04/18/23             Goal: STG-Within one week, patient will transfer bed to chair at SBA level with FWW       Dates: Start: 04/18/23               Problem: PT-Long Term Goals       Dates: Start: 04/18/23         Goal: LTG-By discharge, patient will ambulate 150 feet with FWW at IND level indoors & outdoors.       Dates: Start: 04/18/23            Goal: LTG-By discharge, patient will transfer one surface to another with FWW at IND level.       Dates: Start: 04/18/23            Goal: LTG-By discharge, patient will perform home exercise program with handout at IND level.       Dates: Start: 04/18/23            Goal: LTG-By discharge, patient will ambulate up/down 4 stairs with B railings at IND level.       Dates: Start: 04/18/23            Goal: LTG-By discharge, patient will transfer in/out of a car with FWW at IND level.       Dates: Start: 04/18/23

## 2023-04-20 NOTE — CARE PLAN
Problem: Mobility  Goal: STG-Within one week, patient will ambulate 2x 50 feet with FWW at SBA level.  Outcome: Not Met  Note: PT evaluation completed two days ago on 4/18/23.  Will monitor progress towards STGs.     Problem: Balance  Goal: STG-Within one week, patient will initiate BLE ther ex strengthening towards HEP implementation.  Outcome: Met     Problem: Mobility Transfers  Goal: STG-Within one week, patient will perform bed mobility at SBA level including BLE management in/out of bed.  Outcome: Not Met  Note: PT evaluation completed two days ago on 4/18/23.  Will monitor progress towards STGs.  Goal: STG-Within one week, patient will sit to stand at SBA level with FWW.  Outcome: Not Met  Note: PT evaluation completed two days ago on 4/18/23.  Will monitor progress towards STGs.  Goal: STG-Within one week, patient will transfer bed to chair at SBA level with FWW  Outcome: Not Met  Note: PT evaluation completed two days ago on 4/18/23.  Will monitor progress towards STGs.

## 2023-04-20 NOTE — THERAPY
Recreational Therapy   Initial Evaluation     Patient Name: Lorena No  Age:  73 y.o., Sex:  female  Medical Record #: [id number]  Today's Date: 4/20/2023     Subjective    Patient was ready and agreeable to recreation therapy session.     Objective       04/20/23 0931   Procedural Tracking   Procedural Tracking Community Re-Integration;Community Skills Development;Leisure Skills Awareness;Leisure Skills Development;Cognitive Skills Training;Social Skills Training;Gross Motor Functional Leisure Skills;Fine Motor Functional Leisure Skills;Group Treatment   Treatment Time   Total Time Spent (mins) 30   Total Time Missed 0   Leisure History   Leisure Interests Crafts;Family;Pets;Reading;Hobbies   Pre-Morbid Leisure Lifestyle Individual;Sedentary   Prior Living Arrangements   Lives with - Patient's Self Care Capacity Alone and Able to Care For Self   Steps Into Home 4   Steps In Home 0   Ambulation Independent   Assistive Devices Used None   Driving / Transportation Driving Independent   Functional Ability Status - Physical   Endurance Low   Right  Weak   Left  Weak   Right Arm Weak   Left Arm Weak   Right Leg Weak   Left Leg Weak   Functional Ability Status - Cognitive   Attention Span Remains on Task   Comprehension Follows One Step Commands;Follows Two Step Commands   Judgment Able to Make Independent Decisions   Functional Ability Status - Emotional    Affect Appropriate   Mood Appropriate   Behavior Appropriate;Cooperative   Leisure Competence Measure   Leisure Awareness Moderate Assist   Leisure Attitude Moderate Assist   Leisure Skills Moderate Assist   Cultural / Social Behaviors Moderate Assist   Interpersonal Skills Moderate Assist   Community Integration Skills Moderate Assist   Social Contact Moderate Assist   Community Participation Moderate Assist   Clinical Impression   Clinical Impression Impaired Fine Motor Leisure Functioning;Impaired Gross Motor Leisure Functioning;Impaired Endurance;Impaired  Cognitive Leisure Functioning;Impaired Leisure Skills;Impaired Relaxation and Coping Skills;Impaired Community Skills;Impaired Group Interaction Skills;Impaired Attention Span   Current Discharge Plan   Current Discharge Plan Return to Prior Living Situation   Benefit    Benefit Patient would Benefit from Inpatient Recreational Therapy to Maximize Independent Leisure Functioning    Interdisciplinary Plan of Care Collaboration   Patient Position at End of Therapy Seated;Phone within Reach;Tray Table within Reach;Call Light within Reach   Strengths & Barriers   Strengths Able to follow instructions;Alert and oriented;Willingly participates in therapeutic activities;Pleasant and cooperative;Motivated for self care and independence   Barriers Decreased endurance;Generalized weakness;Fatigue;Impaired activity tolerance;Impaired balance;Pain;Limited mobility         Assessment  Patient is 73 y.o. female with a diagnosis of hip fracture.  Additional factors influencing patient status / progress (ie: cognitive factors, co-morbidities, social support, etc): Patient is a 73 y.o. with a PMH of HTN who presented to ED on 4/14/23 after fall at home. Patient reportedly fell the night prior and had worsening hip pain. She was found in ED to have left hip fracture. Patient underwent IMN treatment on 4/15/23 with Dr. Isidro for the left hip fracture. Post-operative course complicated by severe anemia, leukocytosis, and variable blood pressure. Patient reports having done yard work all day prior to the fall.        Plan  Recommend Recreational Therapy 30-60 minutes per day 3-4 days per week for 10 days for the following treatments:  Community Re-Integration, Community Skills Development, Leisure Skills Awareness, Leisure Skills Development, Social Skills Training, Cognitive Skills Training, Gross Motor Functional Leisure Skills, Fine Motor Functional Leisure Skills, and Group Treatment    Passport items to be completed:  Verbalize  two positive leisure activities, discuss returning to work, hobbies, community groups or volunteer activities, explore community resources     Goals:  Long term and short term goals have been discussed with patient and they are in agreement.    Recreation Therapy Problems (Active)       Problem: Recreation Therapy       Dates: Start: 04/20/23         Goal: STG-Within one week, patient will identify two new leisure interests.        Dates: Start: 04/20/23            Goal: STG-Within one week, patient will identify two new coping skills.        Dates: Start: 04/20/23            Goal: LTG-By discharge, patient will identify three new leisure interests.        Dates: Start: 04/20/23            Goal: LTG-By discharge, patient will identify and demonstrate three new coping skills.        Dates: Start: 04/20/23

## 2023-04-20 NOTE — THERAPY
04/20/23 1350   IDT Conference   IDT Conference I have reviewed and discussed the POC and barriers to discharge for this patient.  I am knowledgeable of the patient's needs and have attended the IDT Conference.  (D/C set for Saturday 4/29/23 pending transportation)

## 2023-04-20 NOTE — CARE PLAN
Problem: Bathing  Goal: STG-Within one week, patient will bathe with CGA overall with AE/DME as needed.  Outcome: Progressing     Problem: Dressing  Goal: STG-Within one week, patient will dress LB with CGA overall with AE/DME as needed.  Outcome: Progressing     Problem: Toileting  Goal: STG-Within one week, patient will complete toileting tasks with CGA overall with AE/DME as needed.  Outcome: Progressing     Problem: Functional Transfers  Goal: STG-Within one week, patient will transfer to toilet with SBA overall with AE/DME as needed.  Outcome: Progressing     Problem: IADL's  Goal: STG-Within one week, patient will access kitchen area with SBA overall with AE/DME as needed.  Outcome: Progressing

## 2023-04-20 NOTE — CARE PLAN
Problem: Skin Integrity  Goal: Patient's skin integrity will be maintained or improve  Outcome: Progressing  Note: Patient's skin remains intact and free from new or accidental injury this shift.  Will continue to monitor.    The patient is Stable - Low risk of patient condition declining or worsening    Shift Goals  Clinical Goals: pain managment/safety  Patient Goals: sleep    Progress made toward(s) clinical / shift goals:  pt skin intact, no s/s breakdown, incisions healing well    Patient is not progressing towards the following goals:

## 2023-04-20 NOTE — CARE PLAN
Problem: Knowledge Deficit - Standard  Goal: Patient and family/care givers will demonstrate understanding of plan of care, disease process/condition, diagnostic tests and medications  Outcome: Progressing     Problem: Fall Risk - Rehab  Goal: Patient will remain free from falls  Outcome: Progressing   The patient is Stable - Low risk of patient condition declining or worsening    Shift Goals  Clinical Goals: pain managment/safety  Patient Goals: sleep    Progress made toward(s) clinical / shift goals:  Patient sleeping in bed reports relief with interventions.    Patient is not progressing towards the following goals:

## 2023-04-20 NOTE — THERAPY
Physical Therapy   Daily Treatment     Patient Name: Lorena No  Age:  73 y.o., Sex:  female  Medical Record #: [id number]  Today's Date: 4/20/2023     Precautions  Precautions: Fall Risk, Weight Bearing As Tolerated Left Lower Extremity  Comments: LLE antalgia    Subjective    Patient agreeable to PT.     Objective       04/20/23 1301   PT Charge Group   PT Gait Training (Units) 1   PT Therapeutic Exercise (Units) 1   PT Total Time Spent   PT Individual Total Time Spent (Mins) 30   Vitals   O2 (LPM) 0   O2 Delivery Device None - Room Air   Gait Functional Level of Assist    Gait Level Of Assist Standby Assist   Assistive Device Front Wheel Walker   Distance (Feet)   (50 ft, 65 ft, 75 ft, and 40 ft; indoors)   # of Times Distance was Traveled 1   Deviation Antalgic;Bradykinetic  (focus on increased R step length for increased LLE wt acceptance and R=L step length; mildly decreased L knee & hip X in MidStance)   Sitting Lower Body Exercises   Sitting Lower Body Exercises   (2 lbs BLE)   Ankle Pumps 1 set of 15   Long Arc Quad 1 set of 10   Marching 1 set of 10  (with Mod A on LLE to increase amplitude of movement)   Interdisciplinary Plan of Care Collaboration   Patient Position at End of Therapy Seated;Chair Alarm On;Self Releasing Lap Belt Applied;Call Light within Reach;Tray Table within Reach;Phone within Reach         Assessment    Patient has improving B step length today but reports decreased endurance due to fatigue; still limited with L hip flexion for all seated activities due to pain, edema, and weakness; pushing for increased LLE wt acceptance, mobility, and decreased A with activities.    Strengths: Able to follow instructions, Alert and oriented, Effective communication skills, Good insight into deficits/needs, Independent prior level of function, Making steady progress towards goals, Motivated for self care and independence, Pleasant and cooperative, Willingly participates in therapeutic  activities  Barriers: Decreased endurance, Generalized weakness, Home accessibility, Impaired balance, Limited mobility, Pain, Poor family support    Plan    Transfers, weight acceptance, ambulation progression, LE strengthening, dynamic balance    Passport items to be completed:  Get in/out of bed safely, in/out of a vehicle, safely use mobility device, walk or wheel around home/community, navigate up and down stairs, show how to get up/down from the ground, ensure home is accessible, demonstrate HEP, complete caregiver training    Physical Therapy Problems (Active)       Problem: Balance       Dates: Start: 04/20/23         Goal: STG-Within one week, patient will perform standing home exercise program with handout at SPV level.       Dates: Start: 04/20/23               Problem: Mobility       Dates: Start: 04/18/23         Goal: STG-Within one week, patient will ambulate 2x 50 feet with FWW at SBA level.       Dates: Start: 04/18/23         Goal Note filed on 04/20/23 0846 by Gomez Antoine, PT       PT evaluation completed two days ago on 4/18/23.  Will monitor progress towards STGs.                 Problem: Mobility Transfers       Dates: Start: 04/18/23         Goal: STG-Within one week, patient will perform bed mobility at SBA level including BLE management in/out of bed.       Dates: Start: 04/18/23         Goal Note filed on 04/20/23 0846 by Gomez Antoine, PT       PT evaluation completed two days ago on 4/18/23.  Will monitor progress towards STGs.              Goal: STG-Within one week, patient will sit to stand at SBA level with FWW.       Dates: Start: 04/18/23         Goal Note filed on 04/20/23 0846 by Gomez Antoine, PT       PT evaluation completed two days ago on 4/18/23.  Will monitor progress towards STGs.              Goal: STG-Within one week, patient will transfer bed to chair at SBA level with FWW       Dates: Start: 04/18/23         Goal Note filed on 04/20/23 0846 by  Gomez Antoine, PT       PT evaluation completed two days ago on 4/18/23.  Will monitor progress towards STGs.                 Problem: PT-Long Term Goals       Dates: Start: 04/18/23         Goal: LTG-By discharge, patient will ambulate 150 feet with FWW at IND level indoors & outdoors.       Dates: Start: 04/18/23            Goal: LTG-By discharge, patient will transfer one surface to another with FWW at IND level.       Dates: Start: 04/18/23            Goal: LTG-By discharge, patient will perform home exercise program with handout at IND level.       Dates: Start: 04/18/23            Goal: LTG-By discharge, patient will ambulate up/down 4 stairs with B railings at IND level.       Dates: Start: 04/18/23            Goal: LTG-By discharge, patient will transfer in/out of a car with FWW at IND level.       Dates: Start: 04/18/23

## 2023-04-20 NOTE — PROGRESS NOTES
Physical Medicine & Rehabilitation Progress Note    Encounter Date: 4/20/2023    Chief Complaint: Decreased mobility, hip pain    Interval Events (Subjective):  Patient sitting up in room. She reportedly had frequency all night long. Discussed would check UA. Discussed otherwise may start medication to relax the bladder. Discussed would recheck anemia as well. Mild elevation in SBP, she reports minimal pain. She does report occasional spasms. Will start Tizanidine. Discussed will need to monitor LFTs    _____________________________________  Interdisciplinary Team Conference   Most recent IDT on 4/20/2023    I, Shaan Burton M.D./Ph.D., was present and led the interdisciplinary team conference on 4/20/2023.  I led the IDT conference and agree with the IDT conference documentation and plan of care as noted below.     Nursing:  Diet Current Diet Order   Procedures    Diet Order Diet: Level 6 - Soft and Bite Sized; Liquid level: Level 0 - Thin       Eating ADL Supervision      % of Last Meal  Oral Nutrition: Breakfast, Between % Consumed   Sleep    Bowel Last BM: 04/19/23   Bladder Continent   Barriers to Discharge Home:  FOBT pending    Physical Therapy:  Bed Mobility    Transfers Contact Guard Assist  Adaptive equipment, Increased time, Supervision for safety, Verbal cueing (FWW)   Mobility Contact Guard Assist   Stairs    Barriers to Discharge Home:  Antalgic gait  Needs to be independent    FWW vs cane    Occupational Therapy:  Grooming Contact Guard Assist, Standing (with FWW for oral hygiene)   Bathing Minimal Assist   UB Dressing Supervision   LB Dressing Standby Assist   Toileting Contact Guard Assist   Shower & Transfer Dominic   Barriers to Discharge Home:  Caregiver for animals  Pain and activity tolerance    Rec Therapy  Leisure with kids    Case Management:  Continues to work on disposition and DME needs.      Discharge  "Date/Disposition:  4/29/23  _____________________________________      Objective:  VITAL SIGNS: BP (!) 149/75   Pulse 85   Temp 37.5 °C (99.5 °F) (Oral) Comment: Took a sip of coffee before  Resp 16   Ht 1.575 m (5' 2\")   Wt 57.1 kg (125 lb 14.4 oz)   SpO2 95%   BMI 23.03 kg/m²   Gen: NAD  Psych: Mood and affect appropriate  CV: RRR, 0 edema  Resp: CTAB, no upper airway sounds  Abd: NTND  Neuro: AOx4, following commands    Laboratory Values:  Recent Results (from the past 72 hour(s))   SARS-CoV-2, PCR (In-House)    Collection Time: 04/17/23  5:00 PM    Specimen: Nasal; Respirate   Result Value Ref Range    SARS-CoV-2 Source NP Swab     SARS-CoV-2 by PCR NotDetected    CBC with Differential    Collection Time: 04/18/23  6:09 AM   Result Value Ref Range    WBC 7.4 4.8 - 10.8 K/uL    RBC 2.24 (L) 4.20 - 5.40 M/uL    Hemoglobin 7.0 (L) 12.0 - 16.0 g/dL    Hematocrit 20.8 (L) 37.0 - 47.0 %    MCV 92.9 81.4 - 97.8 fL    MCH 31.3 27.0 - 33.0 pg    MCHC 33.7 33.6 - 35.0 g/dL    RDW 44.1 35.9 - 50.0 fL    Platelet Count 218 164 - 446 K/uL    MPV 9.8 9.0 - 12.9 fL    Neutrophils-Polys 47.30 44.00 - 72.00 %    Lymphocytes 35.50 22.00 - 41.00 %    Monocytes 11.30 0.00 - 13.40 %    Eosinophils 4.80 0.00 - 6.90 %    Basophils 0.70 0.00 - 1.80 %    Immature Granulocytes 0.40 0.00 - 0.90 %    Nucleated RBC 0.00 /100 WBC    Neutrophils (Absolute) 3.49 2.00 - 7.15 K/uL    Lymphs (Absolute) 2.61 1.00 - 4.80 K/uL    Monos (Absolute) 0.83 0.00 - 0.85 K/uL    Eos (Absolute) 0.35 0.00 - 0.51 K/uL    Baso (Absolute) 0.05 0.00 - 0.12 K/uL    Immature Granulocytes (abs) 0.03 0.00 - 0.11 K/uL    NRBC (Absolute) 0.00 K/uL   Comp Metabolic Panel (CMP)    Collection Time: 04/18/23  6:09 AM   Result Value Ref Range    Sodium 140 135 - 145 mmol/L    Potassium 4.1 3.6 - 5.5 mmol/L    Chloride 109 96 - 112 mmol/L    Co2 23 20 - 33 mmol/L    Anion Gap 8.0 7.0 - 16.0    Glucose 94 65 - 99 mg/dL    Bun 10 8 - 22 mg/dL    Creatinine 0.51 0.50 - " 1.40 mg/dL    Calcium 8.4 (L) 8.5 - 10.5 mg/dL    AST(SGOT) 45 12 - 45 U/L    ALT(SGPT) 18 2 - 50 U/L    Alkaline Phosphatase 51 30 - 99 U/L    Total Bilirubin 0.6 0.1 - 1.5 mg/dL    Albumin 3.1 (L) 3.2 - 4.9 g/dL    Total Protein 5.5 (L) 6.0 - 8.2 g/dL    Globulin 2.4 1.9 - 3.5 g/dL    A-G Ratio 1.3 g/dL   HEMOGLOBIN A1C    Collection Time: 04/18/23  6:09 AM   Result Value Ref Range    Glycohemoglobin 5.2 4.0 - 5.6 %    Est Avg Glucose 103 mg/dL   TSH with Reflex to FT4    Collection Time: 04/18/23  6:09 AM   Result Value Ref Range    TSH 3.850 0.380 - 5.330 uIU/mL   Vitamin D, 25-hydroxy (blood)    Collection Time: 04/18/23  6:09 AM   Result Value Ref Range    25-Hydroxy   Vitamin D 25 38 30 - 100 ng/mL   CREATINE KINASE    Collection Time: 04/18/23  6:09 AM   Result Value Ref Range    CPK Total 728 (H) 0 - 154 U/L   MAGNESIUM    Collection Time: 04/18/23  6:09 AM   Result Value Ref Range    Magnesium 1.6 1.5 - 2.5 mg/dL   PHOSPHORUS    Collection Time: 04/18/23  6:09 AM   Result Value Ref Range    Phosphorus 3.2 2.5 - 4.5 mg/dL   CORRECTED CALCIUM    Collection Time: 04/18/23  6:09 AM   Result Value Ref Range    Correct Calcium 9.1 8.5 - 10.5 mg/dL   ESTIMATED GFR    Collection Time: 04/18/23  6:09 AM   Result Value Ref Range    GFR (CKD-EPI) 100 >60 mL/min/1.73 m 2   OCCULT BLOOD X2 (STOOL)    Collection Time: 04/18/23  7:25 AM   Result Value Ref Range    Occult Blood 1 Negative Negative   FERRITIN    Collection Time: 04/19/23  6:25 AM   Result Value Ref Range    Ferritin 266.0 10.0 - 291.0 ng/mL   IRON/TOTAL IRON BIND    Collection Time: 04/19/23  6:25 AM   Result Value Ref Range    Iron 39 (L) 40 - 170 ug/dL    Total Iron Binding 207 (L) 250 - 450 ug/dL    Unsat Iron Binding 168 110 - 370 ug/dL    % Saturation 19 15 - 55 %   VITAMIN B12    Collection Time: 04/19/23  6:25 AM   Result Value Ref Range    Vitamin B12 -True Cobalamin 794 211 - 911 pg/mL   CBC WITHOUT DIFFERENTIAL    Collection Time: 04/19/23  8:30  AM   Result Value Ref Range    WBC 9.4 4.8 - 10.8 K/uL    RBC 2.51 (L) 4.20 - 5.40 M/uL    Hemoglobin 7.9 (L) 12.0 - 16.0 g/dL    Hematocrit 23.7 (L) 37.0 - 47.0 %    MCV 94.4 81.4 - 97.8 fL    MCH 31.5 27.0 - 33.0 pg    MCHC 33.3 (L) 33.6 - 35.0 g/dL    RDW 45.1 35.9 - 50.0 fL    Platelet Count 288 164 - 446 K/uL    MPV 9.7 9.0 - 12.9 fL       Medications:  Scheduled Medications   Medication Dose Frequency    ferrous sulfate  325 mg BID WITH MEALS    Pharmacy Consult Request  1 Each PHARMACY TO DOSE    senna-docusate  2 Tablet BID    omeprazole  20 mg DAILY    enoxaparin (LOVENOX) injection  40 mg DAILY AT 1800    lidocaine  1 Patch Q24HR     PRN medications: Respiratory Therapy Consult, hydrALAZINE, senna-docusate **AND** polyethylene glycol/lytes **AND** magnesium hydroxide **AND** bisacodyl, mag hydrox-al hydrox-simeth, ondansetron **OR** ondansetron, traZODone, sodium chloride, oxyCODONE immediate-release **OR** oxyCODONE immediate-release, [COMPLETED] celecoxib **FOLLOWED BY** celecoxib, acetaminophen    Diet:  Current Diet Order   Procedures    Diet Order Diet: Level 6 - Soft and Bite Sized; Liquid level: Level 0 - Thin       Medical Decision Making and Plan:  Left hip fracture - Patient with fall on 4/13/23 with severe hip pain found to have fracture s/p IMN with Dr. Isidro on 4/14/23. Patient is WBAT.  -PT and OT for mobility and ADLs. Per guidelines, 15 hours per week between PT, OT and/or SLP.  -Follow-up Dr. Isidro.      Anemia - Check AM CBC - 7.0. Severe worsening anemia puts patient at high risk for further decline including respiratory failure and shock. Consult hospitalist  -Recheck CBC, Fe panel, FOBT. Low Fe, ongoing severe anemia. Repeat 4/21     Leukocytosis - Check AM CBC. Most likely reactionary - improved     Azotemia - Check AM CMP - resolved     Hypocalcemia - Check AM CMP - 9.1, improved.      Hypophosphatemia - Check AM Mag/Phosph - improved. Not on supplements     Rhabdomyolysis -  Check AM CPK - 728 trending down.     Pain - APAP/Oxycodone PRN and Celebrex BID. Discontinue scheduled Celebrex, PRN now available.     Muscle spasms - Patient having severe muscle spasms putting her at risk for increased falls and injury due to hip muscles buckling and given her recent hip fracture she has less ability to protect herself. Will start Tizanidine. Tizanidine is a high risk medication which must have LFTs monitored regularly. Will check AM CMP    Skin - Patient at risk for skin breakdown due to debility in areas including sacrum, achilles, elbows and head in addition to other sites. Nursing to assess skin daily.      GI Ppx - Patient on Prilosec for GERD prophylaxis. Patient on Senna-docusate for constipation prophylaxis.      DVT Ppx - Patient Lovenox on transfer.  ____________________________________    T. Rolando Burton MD/PhD  [facility] - Physical Medicine & Rehabilitation   [facility] - Brain Injury Medicine   ____________________________________

## 2023-04-20 NOTE — THERAPY
Physical Therapy   Daily Treatment     Patient Name: Lorena No  Age:  73 y.o., Sex:  female  Medical Record #: [id number]  Today's Date: 4/20/2023     Precautions  Precautions: Fall Risk, Weight Bearing As Tolerated Left Lower Extremity  Comments: LLE antalgia    Subjective    Pt up in wc, willing to participate.     Objective       04/20/23 1001   PT Charge Group   PT Gait Training (Units) 2   PT Total Time Spent   PT Individual Total Time Spent (Mins) 30   Gait Functional Level of Assist    Gait Level Of Assist Contact Guard Assist   Assistive Device Front Wheel Walker   Distance (Feet) 125   # of Times Distance was Traveled 1   Deviation Antalgic;Step To   Standing Lower Body Exercises   Heel Rise 2 sets of 10   Other Exercises standing terminal knee/ ext/ hip ext with manual cues for quad/glut sets 2 x 10 LLE.   Bed Mobility    Sit to Supine Minimal Assist   Sit to Stand Contact Guard Assist   Neuro-Muscular Treatments   Neuro-Muscular Treatments Anterior weight shift;Biofeedback;Sequencing;Tactile Cuing;Verbal Cuing   Comments gait training with // bars and mirror for visual feedback, verbal and tactile cues for stride through with RLE and increased WB throught  ft x 2.         Assessment    Pt tolerated gait and standing exercises well despite fatigue, remains antalgic and with limited weight bearing/ quad weakness LLE. Provided with cold pack in supine post-tx.    Strengths: Able to follow instructions, Alert and oriented, Effective communication skills, Good insight into deficits/needs, Independent prior level of function, Making steady progress towards goals, Motivated for self care and independence, Pleasant and cooperative, Willingly participates in therapeutic activities  Barriers: Decreased endurance, Generalized weakness, Home accessibility, Impaired balance, Limited mobility, Pain, Poor family support    Plan    Transfers, weight acceptance, ambulation progression, LE strengthening, dynamic  balance     Passport items to be completed:  Get in/out of bed safely, in/out of a vehicle, safely use mobility device, walk or wheel around home/community, navigate up and down stairs, show how to get up/down from the ground, ensure home is accessible, demonstrate HEP, complete caregiver training      Physical Therapy Problems (Active)       Problem: Balance       Dates: Start: 04/20/23         Goal: STG-Within one week, patient will perform standing home exercise program with handout at SPV level.       Dates: Start: 04/20/23               Problem: Mobility       Dates: Start: 04/18/23         Goal: STG-Within one week, patient will ambulate 2x 50 feet with FWW at SBA level.       Dates: Start: 04/18/23         Goal Note filed on 04/20/23 0846 by Gomez Antoine, PT       PT evaluation completed two days ago on 4/18/23.  Will monitor progress towards STGs.                 Problem: Mobility Transfers       Dates: Start: 04/18/23         Goal: STG-Within one week, patient will perform bed mobility at SBA level including BLE management in/out of bed.       Dates: Start: 04/18/23         Goal Note filed on 04/20/23 0846 by Gomez Antoine, PT       PT evaluation completed two days ago on 4/18/23.  Will monitor progress towards STGs.              Goal: STG-Within one week, patient will sit to stand at SBA level with FWW.       Dates: Start: 04/18/23         Goal Note filed on 04/20/23 0846 by Gomez Antoine, PT       PT evaluation completed two days ago on 4/18/23.  Will monitor progress towards STGs.              Goal: STG-Within one week, patient will transfer bed to chair at SBA level with FWW       Dates: Start: 04/18/23         Goal Note filed on 04/20/23 0846 by Gomez Antoine, PT       PT evaluation completed two days ago on 4/18/23.  Will monitor progress towards STGs.                 Problem: PT-Long Term Goals       Dates: Start: 04/18/23         Goal: LTG-By discharge, patient will  ambulate 150 feet with FWW at IND level indoors & outdoors.       Dates: Start: 04/18/23            Goal: LTG-By discharge, patient will transfer one surface to another with FWW at IND level.       Dates: Start: 04/18/23            Goal: LTG-By discharge, patient will perform home exercise program with handout at IND level.       Dates: Start: 04/18/23            Goal: LTG-By discharge, patient will ambulate up/down 4 stairs with B railings at IND level.       Dates: Start: 04/18/23            Goal: LTG-By discharge, patient will transfer in/out of a car with FWW at IND level.       Dates: Start: 04/18/23

## 2023-04-20 NOTE — THERAPY
Occupational Therapy  Daily Treatment     Patient Name: Lorena No  Age:  73 y.o., Sex:  female  Medical Record #: [id number]  Today's Date: 4/20/2023     Precautions  Precautions: Fall Risk, Weight Bearing As Tolerated Left Lower Extremity  Comments: LLE antalgia         Subjective    Pt in bed upon arrival agreeable to OT session.     Objective    **Correction: pt seen at 1401 for 60 min this date.**     04/20/23 1421   OT Charge Group   Charges Yes   OT Self Care / ADL (Units) 4   OT Total Time Spent   OT Individual Total Time Spent (Mins) 60   Precautions   Precautions Fall Risk;Weight Bearing As Tolerated Left Lower Extremity   Comments LLE antalgia   Functional Level of Assist   Grooming Modified Independent;Seated  (Pt with increased fatigue and pain this date for completion of grooming post shower, completed at  level)   Bathing Contact Guard Assist   Upper Body Dressing Supervision   Lower Body Dressing Standby Assist   Bed, Chair, Wheelchair Transfer Contact Guard Assist   Tub / Shower Transfers Contact Guard Assist   Supine Upper Body Exercises   Supine Upper Body Exercises Yes   Bicep Curl 2 sets of 10;Bilateral;Light Resistance Theraband  (pink theraband)   Other Exercise Shoulder ER 2 sets of 10 with pink theraband   Interdisciplinary Plan of Care Collaboration   Patient Position at End of Therapy In Bed;Bed Alarm On;Call Light within Reach;Tray Table within Reach;Phone within Reach         Assessment    Pt tolerated session well, though with increased pain in LLE and more fatigue. Notified RN of pain and was present for checking incision site post shower. Pt able to complete shower with CGA overall for standing balance when attempting to stand. When seated pt able to complete all activities with supervision in shower.    Strengths: Able to follow instructions, Alert and oriented, Good insight into deficits/needs, Independent prior level of function, Making steady progress towards goals, Manages  pain appropriately, Motivated for self care and independence, Pleasant and cooperative, Supportive family, Willingly participates in therapeutic activities    Plan    Overhead reaching with balance  Simple cooking task, laundry  Outdoor mobility as able  Funtional mobility and transfers at FWW level  ADLs at FWW level  Pet management for dog and cat cely reaching to ground to  bowls      Occupational Therapy Goals (Active)       Problem: Bathing       Dates: Start: 04/18/23         Goal: STG-Within one week, patient will bathe with CGA overall with AE/DME as needed.       Dates: Start: 04/18/23               Problem: Dressing       Dates: Start: 04/18/23         Goal: STG-Within one week, patient will dress LB with CGA overall with AE/DME as needed.       Dates: Start: 04/18/23               Problem: Functional Transfers       Dates: Start: 04/18/23         Goal: STG-Within one week, patient will transfer to toilet with SBA overall with AE/DME as needed.       Dates: Start: 04/18/23               Problem: IADL's       Dates: Start: 04/18/23         Goal: STG-Within one week, patient will access kitchen area with SBA overall with AE/DME as needed.       Dates: Start: 04/18/23               Problem: OT Long Term Goals       Dates: Start: 04/18/23         Goal: LTG-By discharge, patient will complete basic self care tasks with mod I overall using AE/DME as needed.       Dates: Start: 04/18/23            Goal: LTG-By discharge, patient will perform bathroom transfers with mod I overall with AE/DME as needed.       Dates: Start: 04/18/23            Goal: LTG-By discharge, patient will complete basic home management with supervision-mod I overall using AE/DME as needed.       Dates: Start: 04/18/23               Problem: Toileting       Dates: Start: 04/18/23         Goal: STG-Within one week, patient will complete toileting tasks with CGA overall with AE/DME as needed.       Dates: Start: 04/18/23

## 2023-04-20 NOTE — DISCHARGE PLANNING
CM met with patient to update on IDT and DC date of 4/29/23.  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  She has a cat and a dog that she cares for and she will have transportation home by her daughter when DC's.  CM answered questions and will continue to monitor for DC needs.

## 2023-04-21 ENCOUNTER — APPOINTMENT (OUTPATIENT)
Dept: OCCUPATIONAL THERAPY | Facility: REHABILITATION | Age: 73
DRG: 560 | End: 2023-04-21
Attending: PHYSICAL MEDICINE & REHABILITATION
Payer: MEDICARE

## 2023-04-21 ENCOUNTER — APPOINTMENT (OUTPATIENT)
Dept: PHYSICAL THERAPY | Facility: REHABILITATION | Age: 73
DRG: 560 | End: 2023-04-21
Attending: PHYSICAL MEDICINE & REHABILITATION
Payer: MEDICARE

## 2023-04-21 LAB
ALBUMIN SERPL BCP-MCNC: 3.4 G/DL (ref 3.2–4.9)
ALBUMIN/GLOB SERPL: 1.3 G/DL
ALP SERPL-CCNC: 66 U/L (ref 30–99)
ALT SERPL-CCNC: 30 U/L (ref 2–50)
ANION GAP SERPL CALC-SCNC: 10 MMOL/L (ref 7–16)
AST SERPL-CCNC: 37 U/L (ref 12–45)
BASOPHILS # BLD AUTO: 0.6 % (ref 0–1.8)
BASOPHILS # BLD: 0.06 K/UL (ref 0–0.12)
BILIRUB SERPL-MCNC: 0.6 MG/DL (ref 0.1–1.5)
BUN SERPL-MCNC: 10 MG/DL (ref 8–22)
CALCIUM ALBUM COR SERPL-MCNC: 9.3 MG/DL (ref 8.5–10.5)
CALCIUM SERPL-MCNC: 8.8 MG/DL (ref 8.5–10.5)
CHLORIDE SERPL-SCNC: 106 MMOL/L (ref 96–112)
CO2 SERPL-SCNC: 23 MMOL/L (ref 20–33)
CREAT SERPL-MCNC: 0.56 MG/DL (ref 0.5–1.4)
EOSINOPHIL # BLD AUTO: 0.29 K/UL (ref 0–0.51)
EOSINOPHIL NFR BLD: 3.1 % (ref 0–6.9)
ERYTHROCYTE [DISTWIDTH] IN BLOOD BY AUTOMATED COUNT: 46.4 FL (ref 35.9–50)
GFR SERPLBLD CREATININE-BSD FMLA CKD-EPI: 97 ML/MIN/1.73 M 2
GLOBULIN SER CALC-MCNC: 2.6 G/DL (ref 1.9–3.5)
GLUCOSE SERPL-MCNC: 93 MG/DL (ref 65–99)
HCT VFR BLD AUTO: 23.1 % (ref 37–47)
HGB BLD-MCNC: 7.5 G/DL (ref 12–16)
IMM GRANULOCYTES # BLD AUTO: 0.06 K/UL (ref 0–0.11)
IMM GRANULOCYTES NFR BLD AUTO: 0.6 % (ref 0–0.9)
LYMPHOCYTES # BLD AUTO: 1.92 K/UL (ref 1–4.8)
LYMPHOCYTES NFR BLD: 20.5 % (ref 22–41)
MCH RBC QN AUTO: 30.7 PG (ref 27–33)
MCHC RBC AUTO-ENTMCNC: 32.5 G/DL (ref 33.6–35)
MCV RBC AUTO: 94.7 FL (ref 81.4–97.8)
MONOCYTES # BLD AUTO: 1.01 K/UL (ref 0–0.85)
MONOCYTES NFR BLD AUTO: 10.8 % (ref 0–13.4)
NEUTROPHILS # BLD AUTO: 6.01 K/UL (ref 2–7.15)
NEUTROPHILS NFR BLD: 64.4 % (ref 44–72)
NRBC # BLD AUTO: 0 K/UL
NRBC BLD-RTO: 0 /100 WBC
PHOSPHATE SERPL-MCNC: 3.3 MG/DL (ref 2.5–4.5)
PLATELET # BLD AUTO: 337 K/UL (ref 164–446)
PMV BLD AUTO: 9.5 FL (ref 9–12.9)
POTASSIUM SERPL-SCNC: 3.8 MMOL/L (ref 3.6–5.5)
PROT SERPL-MCNC: 6 G/DL (ref 6–8.2)
RBC # BLD AUTO: 2.44 M/UL (ref 4.2–5.4)
SODIUM SERPL-SCNC: 139 MMOL/L (ref 135–145)
WBC # BLD AUTO: 9.4 K/UL (ref 4.8–10.8)

## 2023-04-21 PROCEDURE — 700101 HCHG RX REV CODE 250: Performed by: PHYSICAL MEDICINE & REHABILITATION

## 2023-04-21 PROCEDURE — A9270 NON-COVERED ITEM OR SERVICE: HCPCS | Performed by: HOSPITALIST

## 2023-04-21 PROCEDURE — 700111 HCHG RX REV CODE 636 W/ 250 OVERRIDE (IP): Performed by: PHYSICAL MEDICINE & REHABILITATION

## 2023-04-21 PROCEDURE — 84100 ASSAY OF PHOSPHORUS: CPT

## 2023-04-21 PROCEDURE — 97116 GAIT TRAINING THERAPY: CPT

## 2023-04-21 PROCEDURE — A9270 NON-COVERED ITEM OR SERVICE: HCPCS | Performed by: PHYSICAL MEDICINE & REHABILITATION

## 2023-04-21 PROCEDURE — 80053 COMPREHEN METABOLIC PANEL: CPT

## 2023-04-21 PROCEDURE — 36415 COLL VENOUS BLD VENIPUNCTURE: CPT

## 2023-04-21 PROCEDURE — 97110 THERAPEUTIC EXERCISES: CPT

## 2023-04-21 PROCEDURE — 700102 HCHG RX REV CODE 250 W/ 637 OVERRIDE(OP): Performed by: HOSPITALIST

## 2023-04-21 PROCEDURE — 770010 HCHG ROOM/CARE - REHAB SEMI PRIVAT*

## 2023-04-21 PROCEDURE — 99232 SBSQ HOSP IP/OBS MODERATE 35: CPT | Performed by: HOSPITALIST

## 2023-04-21 PROCEDURE — 97535 SELF CARE MNGMENT TRAINING: CPT

## 2023-04-21 PROCEDURE — 97530 THERAPEUTIC ACTIVITIES: CPT

## 2023-04-21 PROCEDURE — 700102 HCHG RX REV CODE 250 W/ 637 OVERRIDE(OP): Performed by: PHYSICAL MEDICINE & REHABILITATION

## 2023-04-21 PROCEDURE — 99232 SBSQ HOSP IP/OBS MODERATE 35: CPT | Performed by: PHYSICAL MEDICINE & REHABILITATION

## 2023-04-21 PROCEDURE — 85025 COMPLETE CBC W/AUTO DIFF WBC: CPT

## 2023-04-21 RX ADMIN — OXYCODONE HYDROCHLORIDE 5 MG: 5 TABLET ORAL at 03:24

## 2023-04-21 RX ADMIN — FERROUS SULFATE TAB 325 MG (65 MG ELEMENTAL FE) 325 MG: 325 (65 FE) TAB at 17:30

## 2023-04-21 RX ADMIN — OMEPRAZOLE 20 MG: 20 CAPSULE, DELAYED RELEASE ORAL at 08:01

## 2023-04-21 RX ADMIN — FERROUS SULFATE TAB 325 MG (65 MG ELEMENTAL FE) 325 MG: 325 (65 FE) TAB at 08:01

## 2023-04-21 RX ADMIN — SENNOSIDES AND DOCUSATE SODIUM 2 TABLET: 50; 8.6 TABLET ORAL at 21:49

## 2023-04-21 RX ADMIN — CELECOXIB 200 MG: 100 CAPSULE ORAL at 06:15

## 2023-04-21 RX ADMIN — ENOXAPARIN SODIUM 40 MG: 40 INJECTION SUBCUTANEOUS at 17:30

## 2023-04-21 RX ADMIN — SENNOSIDES AND DOCUSATE SODIUM 2 TABLET: 50; 8.6 TABLET ORAL at 08:01

## 2023-04-21 RX ADMIN — OXYCODONE HYDROCHLORIDE 5 MG: 5 TABLET ORAL at 15:18

## 2023-04-21 RX ADMIN — OXYCODONE HYDROCHLORIDE 10 MG: 10 TABLET ORAL at 21:48

## 2023-04-21 RX ADMIN — ACETAMINOPHEN 650 MG: 325 TABLET ORAL at 03:21

## 2023-04-21 RX ADMIN — LIDOCAINE 1 PATCH: 50 PATCH TOPICAL at 21:49

## 2023-04-21 ASSESSMENT — GAIT ASSESSMENTS
ASSISTIVE DEVICE: FRONT WHEEL WALKER
GAIT LEVEL OF ASSIST: STANDBY ASSIST
DISTANCE (FEET): 125
DEVIATION: ANTALGIC;BRADYKINETIC;DECREASED HEEL STRIKE
ASSISTIVE DEVICE: FRONT WHEEL WALKER
GAIT LEVEL OF ASSIST: STANDBY ASSIST

## 2023-04-21 ASSESSMENT — ACTIVITIES OF DAILY LIVING (ADL): BED_CHAIR_WHEELCHAIR_TRANSFER_DESCRIPTION: ADAPTIVE EQUIPMENT

## 2023-04-21 ASSESSMENT — ENCOUNTER SYMPTOMS
COUGH: 0
NERVOUS/ANXIOUS: 0
DIARRHEA: 0
BLURRED VISION: 0
DIZZINESS: 0
FEVER: 0

## 2023-04-21 ASSESSMENT — PAIN SCALES - WONG BAKER: WONGBAKER_NUMERICALRESPONSE: HURTS A LITTLE MORE

## 2023-04-21 NOTE — THERAPY
"Occupational Therapy  Daily Treatment     Patient Name: Lorena No  Age:  73 y.o., Sex:  female  Medical Record #: [id number]  Today's Date: 4/21/2023     Precautions  Precautions: (P) Fall Risk, Weight Bearing As Tolerated Left Lower Extremity  Comments: LLE antalgia         Subjective    \"I'd like to be able to  my dog and help him on the couch.\"      Objective       04/21/23 1101   OT Charge Group   Charges Yes   OT Self Care / ADL (Units) 2   OT Total Time Spent   OT Individual Total Time Spent (Mins) 30   Precautions   Precautions Fall Risk;Weight Bearing As Tolerated Left Lower Extremity   Pain   Pain Scales Pérez Baker Scale   Pain 0 - 10 Group   Location Leg   Location Orientation Left   Therapist Pain Assessment During Activity   Pain- Pérez Baker Scale Group   Pérez-No Scale  4    Cognition    Cognition / Consciousness WDL   Level of Consciousness Alert   Functional Level of Assist   Lower Body Dressing Standby Assist   Lower Body Dressing Description Reacher   Bed, Chair, Wheelchair Transfer Standby Assist   Bed Chair Wheelchair Transfer Description Adaptive equipment   Balance   Sitting Balance (Static) Fair +   Sitting Balance (Dynamic) Fair   Standing Balance (Static) Fair   Standing Balance (Dynamic) Fair -   Weight Shift Sitting Fair   Weight Shift Standing Fair   Skilled Intervention Compensatory Strategies   Interdisciplinary Plan of Care Collaboration   IDT Collaboration with  Nursing   Patient Position at End of Therapy Seated;Call Light within Reach;Chair Alarm On   Collaboration Comments CLOF         Assessment    Patient tolerated treatment well with fair standing tolerance and mild LLE pain noted with in-facility mobility.  Patient required two seated rest breaks for 300 ft of ambulation with FWW.      Strengths: Able to follow instructions, Alert and oriented, Good insight into deficits/needs, Independent prior level of function, Making steady progress towards goals, Manages pain " appropriately, Motivated for self care and independence, Pleasant and cooperative, Supportive family, Willingly participates in therapeutic activities    Plan    Overhead reaching with balance  Simple cooking task, laundry  Outdoor mobility as able  Funtional mobility and transfers at FWW level  ADLs at FWW level  Pet management for dog and cat cely reaching to ground to  bowls    Passport items to be completed:  Perform bathroom transfers, complete dressing, complete feeding, get ready for the day, prepare a simple meal, participate in household tasks, adapt home for safety needs, demonstrate home exercise program, complete caregiver training     Occupational Therapy Goals (Active)       Problem: Bathing       Dates: Start: 04/18/23         Goal: STG-Within one week, patient will bathe with CGA overall with AE/DME as needed.       Dates: Start: 04/18/23               Problem: Dressing       Dates: Start: 04/18/23         Goal: STG-Within one week, patient will dress LB with CGA overall with AE/DME as needed.       Dates: Start: 04/18/23               Problem: Functional Transfers       Dates: Start: 04/18/23         Goal: STG-Within one week, patient will transfer to toilet with SBA overall with AE/DME as needed.       Dates: Start: 04/18/23               Problem: IADL's       Dates: Start: 04/18/23         Goal: STG-Within one week, patient will access kitchen area with SBA overall with AE/DME as needed.       Dates: Start: 04/18/23               Problem: OT Long Term Goals       Dates: Start: 04/18/23         Goal: LTG-By discharge, patient will complete basic self care tasks with mod I overall using AE/DME as needed.       Dates: Start: 04/18/23            Goal: LTG-By discharge, patient will perform bathroom transfers with mod I overall with AE/DME as needed.       Dates: Start: 04/18/23            Goal: LTG-By discharge, patient will complete basic home management with supervision-mod I overall using  AE/DME as needed.       Dates: Start: 04/18/23               Problem: Toileting       Dates: Start: 04/18/23         Goal: STG-Within one week, patient will complete toileting tasks with CGA overall with AE/DME as needed.       Dates: Start: 04/18/23

## 2023-04-21 NOTE — THERAPY
Physical Therapy   Daily Treatment     Patient Name: Lorena No  Age:  73 y.o., Sex:  female  Medical Record #: [id number]  Today's Date: 4/21/2023     Precautions  Precautions: Fall Risk, Weight Bearing As Tolerated Left Lower Extremity  Comments: LLE antalgia    Subjective    Patient agreeable to PT.     Objective       04/21/23 0931   PT Charge Group   PT Therapeutic Exercise (Units) 2   PT Total Time Spent   PT Individual Total Time Spent (Mins) 30   Standing Lower Body Exercises   Standing Lower Body Exercises   (// bars, SBA-SPV)   Hamstring Curl 2 sets of 10   Hip Abduction 2 sets of 10   Marching 2 sets of 15   Heel Rise 2 sets of 10   Toe Rise 2 sets of 10   Mini Squat 2 sets of 10   Bed Mobility    Sit to Stand Supervised  (in // bars, setup A)   Interdisciplinary Plan of Care Collaboration   Patient Position at End of Therapy Seated;Chair Alarm On;Self Releasing Lap Belt Applied;Call Light within Reach;Tray Table within Reach;Phone within Reach         Assessment    Patient has improving use of LLE with repetition; improving endurance and activity tolerance during standing HEP noted; good motivation; requires external cueing for increased quality with L hip flexion, still has present but slowly improving LLE pain, requires cueing for inhibition of compensations with L hip flexion prn.    Strengths: Able to follow instructions, Alert and oriented, Effective communication skills, Good insight into deficits/needs, Independent prior level of function, Making steady progress towards goals, Motivated for self care and independence, Pleasant and cooperative, Willingly participates in therapeutic activities  Barriers: Decreased endurance, Generalized weakness, Home accessibility, Impaired balance, Limited mobility, Pain, Poor family support    Plan    LLE weight acceptance, FWW vs 4WW ambulation progression, LLE strengthening, initiate car transfers, stairs, dynamic balance    Passport items to be  completed:  Get in/out of bed safely, in/out of a vehicle, safely use mobility device, walk or wheel around home/community, navigate up and down stairs, show how to get up/down from the ground, ensure home is accessible, demonstrate HEP, complete caregiver training    Physical Therapy Problems (Active)       Problem: Balance       Dates: Start: 04/20/23         Goal: STG-Within one week, patient will perform standing home exercise program with handout at SPV level.       Dates: Start: 04/20/23               Problem: Mobility       Dates: Start: 04/18/23         Goal: STG-Within one week, patient will ambulate 2x 50 feet with FWW at SBA level.       Dates: Start: 04/18/23         Goal Note filed on 04/20/23 0846 by Gomez Antoine, PT       PT evaluation completed two days ago on 4/18/23.  Will monitor progress towards STGs.                 Problem: Mobility Transfers       Dates: Start: 04/18/23         Goal: STG-Within one week, patient will perform bed mobility at SBA level including BLE management in/out of bed.       Dates: Start: 04/18/23         Goal Note filed on 04/20/23 0846 by Gomez Antoine, PT       PT evaluation completed two days ago on 4/18/23.  Will monitor progress towards STGs.              Goal: STG-Within one week, patient will sit to stand at SBA level with FWW.       Dates: Start: 04/18/23         Goal Note filed on 04/20/23 0846 by Gomez Antoine, PT       PT evaluation completed two days ago on 4/18/23.  Will monitor progress towards STGs.              Goal: STG-Within one week, patient will transfer bed to chair at SBA level with FWW       Dates: Start: 04/18/23         Goal Note filed on 04/20/23 0846 by Gomez Antoine, PT       PT evaluation completed two days ago on 4/18/23.  Will monitor progress towards STGs.                 Problem: PT-Long Term Goals       Dates: Start: 04/18/23         Goal: LTG-By discharge, patient will ambulate 150 feet with FWW at IND  level indoors & outdoors.       Dates: Start: 04/18/23            Goal: LTG-By discharge, patient will transfer one surface to another with FWW at IND level.       Dates: Start: 04/18/23            Goal: LTG-By discharge, patient will perform home exercise program with handout at IND level.       Dates: Start: 04/18/23            Goal: LTG-By discharge, patient will ambulate up/down 4 stairs with B railings at IND level.       Dates: Start: 04/18/23            Goal: LTG-By discharge, patient will transfer in/out of a car with FWW at IND level.       Dates: Start: 04/18/23

## 2023-04-21 NOTE — PROGRESS NOTES
[facility] Medicine Daily Progress Note    Date of Service  4/21/2023    Chief Complaint:  Anemia with acuter blood loss.    Interval History:  Note (04/21):  Patient has been doing well for a while.                          No medical issues to address a this time.                          Continue current care.                          Will sign off.                          Re-consult is needed.    Review of Systems  Review of Systems   Constitutional:  Negative for fever.   Eyes:  Negative for blurred vision.   Respiratory:  Negative for cough.    Cardiovascular:  Negative for chest pain.   Gastrointestinal:  Negative for diarrhea.   Musculoskeletal:  Negative for joint pain.   Neurological:  Negative for dizziness.   Psychiatric/Behavioral:  The patient is not nervous/anxious.       Physical Exam  Temp:  [36.7 °C (98.1 °F)-36.9 °C (98.5 °F)] 36.7 °C (98.1 °F)  Pulse:  [] 95  Resp:  [16-18] 18  BP: (121-149)/(70-82) 149/82  SpO2:  [95 %-97 %] 95 %    Physical Exam  Vitals and nursing note reviewed.   Constitutional:       Appearance: She is not diaphoretic.   HENT:      Mouth/Throat:      Pharynx: No oropharyngeal exudate or posterior oropharyngeal erythema.   Eyes:      Extraocular Movements: Extraocular movements intact.   Neck:      Vascular: No carotid bruit or JVD.   Cardiovascular:      Rate and Rhythm: Normal rate and regular rhythm.      Heart sounds: Normal heart sounds. No murmur heard.  Pulmonary:      Effort: Pulmonary effort is normal.      Breath sounds: Normal breath sounds. No stridor.   Abdominal:      General: Bowel sounds are normal.      Palpations: Abdomen is soft.   Musculoskeletal:      Right lower leg: No edema.      Left lower leg: Edema present.      Comments: Hs left upper leg swelling.   Skin:     General: Skin is warm and dry.      Findings: No rash.   Neurological:      Mental Status: She is alert and oriented to person, place, and time.   Psychiatric:         Mood and Affect:  Mood normal.         Behavior: Behavior normal.       Fluids  No intake or output data in the 24 hours ending 04/21/23 1020      Laboratory  Recent Labs     04/19/23  0830 04/21/23  0557   WBC 9.4 9.4   RBC 2.51* 2.44*   HEMOGLOBIN 7.9* 7.5*   HEMATOCRIT 23.7* 23.1*   MCV 94.4 94.7   MCH 31.5 30.7   MCHC 33.3* 32.5*   RDW 45.1 46.4   PLATELETCT 288 337   MPV 9.7 9.5       Recent Labs     04/21/23  0557   SODIUM 139   POTASSIUM 3.8   CHLORIDE 106   CO2 23   GLUCOSE 93   BUN 10   CREATININE 0.56   CALCIUM 8.8                     Imaging    Assessment/Plan  * Hip fracture requiring operative repair, left, closed, initial encounter ([facility])- (present on admission)  Assessment & Plan  2nd to [facility]  S/P surgical repair    Acute blood loss anemia  Assessment & Plan  Hb: 11.7 (4/14) --> 9.4 (4/15) --> 7.7 (4/16) --> 7.2 (4/17) --> 7.0 (4/18) --> 7.9 (4/19) --> 7.5 (4/21)  H&H was ok before surgery  Has left upper left swelling   Recent loss likely 2nd to surgery with post-op bleed  Fe: 39, sats 19%  B12: wnl  SFOB (-) x 1  Cont Fe supplements  Monitor for now

## 2023-04-21 NOTE — CARE PLAN
"The patient is Watcher - Medium risk of patient condition declining or worsening    Shift Goals  Clinical Goals: safety    Problem: Pain - Standard  Goal: Alleviation of pain or a reduction in pain to the patient’s comfort goal  Note: Patient is having leg pain, PRN oxy given, will continue to monitor.      Problem: Fall Risk - Rehab  Goal: Patient will remain free from falls  Note: Michelle English Fall risk Assessment Score: 11    Moderate fall risk Interventions  - Bed and strip alarm   - Yellow sign by the door   - Yellow wrist band \"Fall risk\"  - Room near to the nurse station  - Do not leave patient unattended in the bathroom  - Fall risk education provided     "

## 2023-04-21 NOTE — CARE PLAN
The patient is Stable - Low risk of patient condition declining or worsening    Shift Goals  Clinical Goals: pain managment/sleep  Patient Goals: sleep    Progress made toward(s) clinical / shift goals:        Problem: Pain - Standard  Goal: Alleviation of pain or a reduction in pain to the patient’s comfort goal  Outcome: Progressing, post opt pain improving, prn tylenol provided. Pt resting comfortably.      Problem: Skin Integrity  Goal: Patient's skin integrity will be maintained or improve  Outcome: Progressing, surgical site sophia, well approximated staples intact.

## 2023-04-21 NOTE — THERAPY
"Occupational Therapy  Daily Treatment     Patient Name: Lorena No  Age:  73 y.o., Sex:  female  Medical Record #: [id number]  Today's Date: 4/21/2023     Precautions  Precautions: Fall Risk, Weight Bearing As Tolerated Left Lower Extremity  Comments: LLE antalgia         Subjective    \"I've been wheeling all around using both legs to switch rooms. That's probably why I'm in a little bit more pain.\"      Objective       04/21/23 1401   OT Charge Group   Charges Yes   OT Self Care / ADL (Units) 1   OT Therapy Activity (Units) 3   OT Total Time Spent   OT Individual Total Time Spent (Mins) 60   Cognition    Cognition / Consciousness WDL   Level of Consciousness Alert   Sitting Upper Body Exercises   Sitting Upper Body Exercises Yes   Other Exercise Simulation of picking dog up from ground to couch level.  Patient able to  16# from ground level without LOB or increased c/o pain x 6.   IADL Treatments   IADL Treatments Meal preparation   Meal Preparation Standing cooking activity with seated RBs as needed secondary to L hip pain.  Good overall safety awareness and fair standing tolerance.  Patient notes that she does have a chair that she could utilize at home for longer cooking activity.   Balance   Sitting Balance (Static) Fair +   Sitting Balance (Dynamic) Fair   Standing Balance (Static) Fair   Standing Balance (Dynamic) Fair -   Weight Shift Sitting Fair   Weight Shift Standing Fair   Skilled Intervention Compensatory Strategies   Interdisciplinary Plan of Care Collaboration   IDT Collaboration with  Physical Therapist   Patient Position at End of Therapy Seated   Collaboration Comments Transfer of care to PT         Assessment    Patient tolerated standing meal prep activity well with seated rest breaks provided secondary to increased L hip pain. Simulated \"Wilbert pick-up\" activity practiced from seated level with patient able to  16# weight without LOB or increased complaint of LLE pain x 6. "     Strengths: Able to follow instructions, Alert and oriented, Good insight into deficits/needs, Independent prior level of function, Making steady progress towards goals, Manages pain appropriately, Motivated for self care and independence, Pleasant and cooperative, Supportive family, Willingly participates in therapeutic activities    Plan    Patient would like to do laundry tomorrow.  Continue focus on increased standing tolerance, picking up items from floor level with/without modification (pet food bowls are the primary concern), and overhead reaching activity in standing.     Passport items to be completed:  Perform bathroom transfers, complete dressing, complete feeding, get ready for the day, prepare a simple meal, participate in household tasks, adapt home for safety needs, demonstrate home exercise program, complete caregiver training     Occupational Therapy Goals (Active)       Problem: Bathing       Dates: Start: 04/18/23         Goal: STG-Within one week, patient will bathe with CGA overall with AE/DME as needed.       Dates: Start: 04/18/23               Problem: Dressing       Dates: Start: 04/18/23         Goal: STG-Within one week, patient will dress LB with CGA overall with AE/DME as needed.       Dates: Start: 04/18/23               Problem: Functional Transfers       Dates: Start: 04/18/23         Goal: STG-Within one week, patient will transfer to toilet with SBA overall with AE/DME as needed.       Dates: Start: 04/18/23               Problem: IADL's       Dates: Start: 04/18/23         Goal: STG-Within one week, patient will access kitchen area with SBA overall with AE/DME as needed.       Dates: Start: 04/18/23               Problem: OT Long Term Goals       Dates: Start: 04/18/23         Goal: LTG-By discharge, patient will complete basic self care tasks with mod I overall using AE/DME as needed.       Dates: Start: 04/18/23            Goal: LTG-By discharge, patient will perform bathroom  transfers with mod I overall with AE/DME as needed.       Dates: Start: 04/18/23            Goal: LTG-By discharge, patient will complete basic home management with supervision-mod I overall using AE/DME as needed.       Dates: Start: 04/18/23               Problem: Toileting       Dates: Start: 04/18/23         Goal: STG-Within one week, patient will complete toileting tasks with CGA overall with AE/DME as needed.       Dates: Start: 04/18/23

## 2023-04-21 NOTE — THERAPY
Physical Therapy   Daily Treatment     Patient Name: Lorena No  Age:  73 y.o., Sex:  female  Medical Record #: [id number]  Today's Date: 4/21/2023     Precautions  Precautions: Fall Risk, Weight Bearing As Tolerated Left Lower Extremity  Comments: LLE antalgia    Subjective    Patient agreeable to PT.     Objective       04/21/23 1501   PT Charge Group   PT Gait Training (Units) 2   PT Therapeutic Exercise (Units) 1   PT Therapeutic Activities (Units) 1   PT Total Time Spent   PT Individual Total Time Spent (Mins) 60   Vitals   O2 (LPM) 0   O2 Delivery Device None - Room Air   Pain   Intervention Rest;Ambulation / Increased Activity;Education   Pain 0 - 10 Group   Location Leg   Location Orientation Left   Description Aching   Comfort Goal 0   Therapist Pain Assessment   (6-7 with walking; edema continues; RN notified)   Gait Functional Level of Assist    Gait Level Of Assist Standby Assist   Assistive Device Front Wheel Walker   Distance (Feet) 125  (indoors)   # of Times Distance was Traveled 3   Deviation Antalgic;Bradykinetic;Decreased Heel Strike  (cueing for increased R step length and cueing for increased heel strike.  Moderate WB through BUE onto FWW as pain increases.)   Sitting Lower Body Exercises   Sitting Lower Body Exercises   (4 lbs RLE, 2 lbs LLE)   Ankle Pumps 2 sets of 15   Long Arc Quad 2 sets of 10   Marching 2 sets of 10  (improving L hip flexion noted)   Standing Lower Body Exercises   Comments will continue standing exercises and stair training next week due to antalgia and fatigue today; discussed initiation of car transfers, object from floor, and outdoor ambulation with patient; also discussed pain management   Bed Mobility    Sit to Stand Standby Assist  (FWW)   Scooting Supervised   Interdisciplinary Plan of Care Collaboration   IDT Collaboration with  Nursing   Patient Position at End of Therapy Seated;Chair Alarm On;Self Releasing Lap Belt Applied;Call Light within Reach;Tray Table  within Reach;Phone within Reach   Collaboration Comments pain meds admin         Assessment    Patient has improving ambulation endurance today; requires prolonged rest breaks due to pain; good motivation and improving use of L hip flexion without self-assist with BUE; requires repetition with education as pt unable to recall some of education by end of session; pleasant.    Strengths: Able to follow instructions, Alert and oriented, Effective communication skills, Good insight into deficits/needs, Independent prior level of function, Making steady progress towards goals, Motivated for self care and independence, Pleasant and cooperative, Willingly participates in therapeutic activities  Barriers: Decreased endurance, Generalized weakness, Home accessibility, Impaired balance, Limited mobility, Pain, Poor family support    Plan    LLE weight acceptance, FWW ambulation progression outdoors (trial 4WW?), LLE strengthening, initiate car transfers, stairs, standing balance with decreased UE support    Passport items to be completed:  Get in/out of bed safely, in/out of a vehicle, safely use mobility device, walk or wheel around home/community, navigate up and down stairs, show how to get up/down from the ground, ensure home is accessible, demonstrate HEP, complete caregiver training    Physical Therapy Problems (Active)       Problem: Balance       Dates: Start: 04/20/23         Goal: STG-Within one week, patient will perform standing home exercise program with handout at SPV level.       Dates: Start: 04/20/23               Problem: Mobility       Dates: Start: 04/18/23         Goal: STG-Within one week, patient will ambulate 2x 50 feet with FWW at SBA level.       Dates: Start: 04/18/23         Goal Note filed on 04/20/23 0846 by Gomez Antoine, PT       PT evaluation completed two days ago on 4/18/23.  Will monitor progress towards STGs.                 Problem: Mobility Transfers       Dates: Start: 04/18/23          Goal: STG-Within one week, patient will perform bed mobility at SBA level including BLE management in/out of bed.       Dates: Start: 04/18/23         Goal Note filed on 04/20/23 0846 by Gomez Antoine, PT       PT evaluation completed two days ago on 4/18/23.  Will monitor progress towards STGs.              Goal: STG-Within one week, patient will sit to stand at SBA level with FWW.       Dates: Start: 04/18/23         Goal Note filed on 04/20/23 0846 by Gomez Antoine, PT       PT evaluation completed two days ago on 4/18/23.  Will monitor progress towards STGs.              Goal: STG-Within one week, patient will transfer bed to chair at SBA level with FWW       Dates: Start: 04/18/23         Goal Note filed on 04/20/23 0846 by Gomez Antoine, PT       PT evaluation completed two days ago on 4/18/23.  Will monitor progress towards STGs.                 Problem: PT-Long Term Goals       Dates: Start: 04/18/23         Goal: LTG-By discharge, patient will ambulate 150 feet with FWW at IND level indoors & outdoors.       Dates: Start: 04/18/23            Goal: LTG-By discharge, patient will transfer one surface to another with FWW at IND level.       Dates: Start: 04/18/23            Goal: LTG-By discharge, patient will perform home exercise program with handout at IND level.       Dates: Start: 04/18/23            Goal: LTG-By discharge, patient will ambulate up/down 4 stairs with B railings at IND level.       Dates: Start: 04/18/23            Goal: LTG-By discharge, patient will transfer in/out of a car with FWW at IND level.       Dates: Start: 04/18/23

## 2023-04-21 NOTE — THERAPY
Physical Therapy   Daily Treatment     Patient Name: Lorena No  Age:  73 y.o., Sex:  female  Medical Record #: [id number]  Today's Date: 4/21/2023     Precautions  Precautions: Fall Risk, Weight Bearing As Tolerated Left Lower Extremity  Comments: LLE antalgia    Subjective    Pt seated in the wc upon arrival, agreeable to PT     Objective       04/21/23 0831   PT Charge Group   PT Gait Training (Units) 1   PT Therapeutic Exercise (Units) 1   Supervising Physical Therapist Christos Antoine   PT Total Time Spent   PT Individual Total Time Spent (Mins) 30   Gait Functional Level of Assist    Gait Level Of Assist Standby Assist   Assistive Device Front Wheel Walker   Distance (Feet)   (71', 132' on indoor level surfaces)   # of Times Distance was Traveled 2   Deviation Antalgic;Step To;Other (Comment);Bradykinetic;Decreased Heel Strike;Decreased Toe Off  (step to/ step through, R LE vautling vc for in knee extension in mid stance)   Standing Lower Body Exercises   Standing Lower Body Exercises Yes   Hamstring Curl 2 sets of 10   Marching 2 sets of 10   Heel Rise 2 sets of 10   Bed Mobility    Sit to Stand Standby Assist   Interdisciplinary Plan of Care Collaboration   Patient Position at End of Therapy Seated;Chair Alarm On;Call Light within Reach         Assessment    Pt with improved gait quality after first walk, she increased her distance however cont to be limited by L hip weakness.   Strengths: Able to follow instructions, Alert and oriented, Effective communication skills, Good insight into deficits/needs, Independent prior level of function, Making steady progress towards goals, Motivated for self care and independence, Pleasant and cooperative, Willingly participates in therapeutic activities  Barriers: Decreased endurance, Generalized weakness, Home accessibility, Impaired balance, Limited mobility, Pain, Poor family support    Plan    Transfers, weight acceptance, ambulation progression, LE strengthening,  dynamic balance     Passport items to be completed:  Get in/out of bed safely, in/out of a vehicle, safely use mobility device, walk or wheel around home/community, navigate up and down stairs, show how to get up/down from the ground, ensure home is accessible, demonstrate HEP, complete caregiver training    Physical Therapy Problems (Active)       Problem: Balance       Dates: Start: 04/20/23         Goal: STG-Within one week, patient will perform standing home exercise program with handout at SPV level.       Dates: Start: 04/20/23               Problem: Mobility       Dates: Start: 04/18/23         Goal: STG-Within one week, patient will ambulate 2x 50 feet with FWW at SBA level.       Dates: Start: 04/18/23         Goal Note filed on 04/20/23 0846 by Gomez Antoine, PT       PT evaluation completed two days ago on 4/18/23.  Will monitor progress towards STGs.                 Problem: Mobility Transfers       Dates: Start: 04/18/23         Goal: STG-Within one week, patient will perform bed mobility at SBA level including BLE management in/out of bed.       Dates: Start: 04/18/23         Goal Note filed on 04/20/23 0846 by Gomez Antoine, PT       PT evaluation completed two days ago on 4/18/23.  Will monitor progress towards STGs.              Goal: STG-Within one week, patient will sit to stand at SBA level with FWW.       Dates: Start: 04/18/23         Goal Note filed on 04/20/23 0846 by Gomez Antoine, PT       PT evaluation completed two days ago on 4/18/23.  Will monitor progress towards STGs.              Goal: STG-Within one week, patient will transfer bed to chair at SBA level with FWW       Dates: Start: 04/18/23         Goal Note filed on 04/20/23 0846 by Gomez Antoine, PT       PT evaluation completed two days ago on 4/18/23.  Will monitor progress towards STGs.                 Problem: PT-Long Term Goals       Dates: Start: 04/18/23         Goal: LTG-By discharge, patient  will ambulate 150 feet with FWW at IND level indoors & outdoors.       Dates: Start: 04/18/23            Goal: LTG-By discharge, patient will transfer one surface to another with FWW at IND level.       Dates: Start: 04/18/23            Goal: LTG-By discharge, patient will perform home exercise program with handout at IND level.       Dates: Start: 04/18/23            Goal: LTG-By discharge, patient will ambulate up/down 4 stairs with B railings at IND level.       Dates: Start: 04/18/23            Goal: LTG-By discharge, patient will transfer in/out of a car with FWW at IND level.       Dates: Start: 04/18/23

## 2023-04-21 NOTE — PROGRESS NOTES
"  Physical Medicine & Rehabilitation Progress Note    Encounter Date: 4/21/2023    Chief Complaint: Decreased mobility, hip pain    Interval Events (Subjective):  Patient sitting up in room. She reports therapy is going well. Reviewed AM labs and continues to be anemic. Discussed would repeat Monday. Denies SOB. Denies NVD.    _____________________________________  Interdisciplinary Team Conference   Most recent IDT on 4/20/2023     Discharge Date/Disposition:  4/29/23  _____________________________________      Objective:  VITAL SIGNS: BP (!) 149/82   Pulse 95   Temp 36.7 °C (98.1 °F) (Oral)   Resp 18   Ht 1.575 m (5' 2\")   Wt 57.1 kg (125 lb 14.4 oz)   SpO2 95%   BMI 23.03 kg/m²   Gen: NAD  Psych: Mood and affect appropriate  CV: RRR, 0 edema  Resp: CTAB, no upper airway sounds  Abd: NTND  Neuro: AOx4, following commands  Unchanged from 4/20/23    Laboratory Values:  Recent Results (from the past 72 hour(s))   FERRITIN    Collection Time: 04/19/23  6:25 AM   Result Value Ref Range    Ferritin 266.0 10.0 - 291.0 ng/mL   IRON/TOTAL IRON BIND    Collection Time: 04/19/23  6:25 AM   Result Value Ref Range    Iron 39 (L) 40 - 170 ug/dL    Total Iron Binding 207 (L) 250 - 450 ug/dL    Unsat Iron Binding 168 110 - 370 ug/dL    % Saturation 19 15 - 55 %   VITAMIN B12    Collection Time: 04/19/23  6:25 AM   Result Value Ref Range    Vitamin B12 -True Cobalamin 794 211 - 911 pg/mL   CBC WITHOUT DIFFERENTIAL    Collection Time: 04/19/23  8:30 AM   Result Value Ref Range    WBC 9.4 4.8 - 10.8 K/uL    RBC 2.51 (L) 4.20 - 5.40 M/uL    Hemoglobin 7.9 (L) 12.0 - 16.0 g/dL    Hematocrit 23.7 (L) 37.0 - 47.0 %    MCV 94.4 81.4 - 97.8 fL    MCH 31.5 27.0 - 33.0 pg    MCHC 33.3 (L) 33.6 - 35.0 g/dL    RDW 45.1 35.9 - 50.0 fL    Platelet Count 288 164 - 446 K/uL    MPV 9.7 9.0 - 12.9 fL   URINALYSIS    Collection Time: 04/20/23  1:45 PM   Result Value Ref Range    Color Yellow     Character Clear     Specific Gravity 1.012 " <1.035    Ph 6.5 5.0 - 8.0    Glucose Negative Negative mg/dL    Ketones Negative Negative mg/dL    Protein Negative Negative mg/dL    Bilirubin Negative Negative    Urobilinogen, Urine 1.0 Negative    Nitrite Negative Negative    Leukocyte Esterase Trace (A) Negative    Occult Blood Negative Negative    Micro Urine Req Microscopic    URINE MICROSCOPIC (W/UA)    Collection Time: 04/20/23  1:45 PM   Result Value Ref Range    WBC 0-2 /hpf    RBC 0-2 /hpf    Bacteria Negative None /hpf    Epithelial Cells Negative /hpf    Hyaline Cast 0-2 /lpf   OCCULT BLOOD STOOL    Collection Time: 04/20/23  7:38 PM   Result Value Ref Range    Occult Blood Feces Negative Negative   CBC WITH DIFFERENTIAL    Collection Time: 04/21/23  5:57 AM   Result Value Ref Range    WBC 9.4 4.8 - 10.8 K/uL    RBC 2.44 (L) 4.20 - 5.40 M/uL    Hemoglobin 7.5 (L) 12.0 - 16.0 g/dL    Hematocrit 23.1 (L) 37.0 - 47.0 %    MCV 94.7 81.4 - 97.8 fL    MCH 30.7 27.0 - 33.0 pg    MCHC 32.5 (L) 33.6 - 35.0 g/dL    RDW 46.4 35.9 - 50.0 fL    Platelet Count 337 164 - 446 K/uL    MPV 9.5 9.0 - 12.9 fL    Neutrophils-Polys 64.40 44.00 - 72.00 %    Lymphocytes 20.50 (L) 22.00 - 41.00 %    Monocytes 10.80 0.00 - 13.40 %    Eosinophils 3.10 0.00 - 6.90 %    Basophils 0.60 0.00 - 1.80 %    Immature Granulocytes 0.60 0.00 - 0.90 %    Nucleated RBC 0.00 /100 WBC    Neutrophils (Absolute) 6.01 2.00 - 7.15 K/uL    Lymphs (Absolute) 1.92 1.00 - 4.80 K/uL    Monos (Absolute) 1.01 (H) 0.00 - 0.85 K/uL    Eos (Absolute) 0.29 0.00 - 0.51 K/uL    Baso (Absolute) 0.06 0.00 - 0.12 K/uL    Immature Granulocytes (abs) 0.06 0.00 - 0.11 K/uL    NRBC (Absolute) 0.00 K/uL   PHOSPHORUS    Collection Time: 04/21/23  5:57 AM   Result Value Ref Range    Phosphorus 3.3 2.5 - 4.5 mg/dL   Comp Metabolic Panel    Collection Time: 04/21/23  5:57 AM   Result Value Ref Range    Sodium 139 135 - 145 mmol/L    Potassium 3.8 3.6 - 5.5 mmol/L    Chloride 106 96 - 112 mmol/L    Co2 23 20 - 33 mmol/L     Anion Gap 10.0 7.0 - 16.0    Glucose 93 65 - 99 mg/dL    Bun 10 8 - 22 mg/dL    Creatinine 0.56 0.50 - 1.40 mg/dL    Calcium 8.8 8.5 - 10.5 mg/dL    AST(SGOT) 37 12 - 45 U/L    ALT(SGPT) 30 2 - 50 U/L    Alkaline Phosphatase 66 30 - 99 U/L    Total Bilirubin 0.6 0.1 - 1.5 mg/dL    Albumin 3.4 3.2 - 4.9 g/dL    Total Protein 6.0 6.0 - 8.2 g/dL    Globulin 2.6 1.9 - 3.5 g/dL    A-G Ratio 1.3 g/dL   CORRECTED CALCIUM    Collection Time: 04/21/23  5:57 AM   Result Value Ref Range    Correct Calcium 9.3 8.5 - 10.5 mg/dL   ESTIMATED GFR    Collection Time: 04/21/23  5:57 AM   Result Value Ref Range    GFR (CKD-EPI) 97 >60 mL/min/1.73 m 2       Medications:  Scheduled Medications   Medication Dose Frequency    ferrous sulfate  325 mg BID WITH MEALS    Pharmacy Consult Request  1 Each PHARMACY TO DOSE    senna-docusate  2 Tablet BID    omeprazole  20 mg DAILY    enoxaparin (LOVENOX) injection  40 mg DAILY AT 1800    lidocaine  1 Patch Q24HR     PRN medications: tizanidine, Respiratory Therapy Consult, hydrALAZINE, senna-docusate **AND** polyethylene glycol/lytes **AND** magnesium hydroxide **AND** bisacodyl, mag hydrox-al hydrox-simeth, ondansetron **OR** ondansetron, traZODone, sodium chloride, oxyCODONE immediate-release **OR** oxyCODONE immediate-release, [COMPLETED] celecoxib **FOLLOWED BY** celecoxib, acetaminophen    Diet:  Current Diet Order   Procedures    Diet Order Diet: Level 6 - Soft and Bite Sized; Liquid level: Level 0 - Thin       Medical Decision Making and Plan:  Left hip fracture - Patient with fall on 4/13/23 with severe hip pain found to have fracture s/p IMN with Dr. Isidro on 4/14/23. Patient is WBAT.  -PT and OT for mobility and ADLs. Per guidelines, 15 hours per week between PT, OT and/or SLP.  -Follow-up Dr. Isidro.      Anemia - Check AM CBC - 7.0. Severe worsening anemia puts patient at high risk for further decline including respiratory failure and shock. Consult hospitalist  -Recheck CBC,  Fe panel, FOBT. Low Fe, ongoing severe anemia. Repeat 4/21 - 7.5     Leukocytosis - Check AM CBC. Most likely reactionary - improved     Azotemia - Check AM CMP - resolved     Hypocalcemia - Check AM CMP - 9.1, improved.      Hypophosphatemia - Check AM Mag/Phosph - improved. Not on supplements     Rhabdomyolysis - Check AM CPK - 728 trending down.     Pain - APAP/Oxycodone PRN and Celebrex BID. Discontinue scheduled Celebrex, PRN now available.     Muscle spasms - Patient having severe muscle spasms putting her at risk for increased falls and injury due to hip muscles buckling and given her recent hip fracture she has less ability to protect herself. Will start Tizanidine. Tizanidine is a high risk medication which must have LFTs monitored regularly. Will check AM CMP - Cr and LFTs stable. Continue Tizanidine for hip spasms    Skin - Patient at risk for skin breakdown due to debility in areas including sacrum, achilles, elbows and head in addition to other sites. Nursing to assess skin daily.      GI Ppx - Patient on Prilosec for GERD prophylaxis. Patient on Senna-docusate for constipation prophylaxis.      DVT Ppx - Patient Lovenox on transfer.  ____________________________________    T. Rolando Burton MD/PhD  [facility] - Physical Medicine & Rehabilitation   [facility] - Brain Injury Medicine   ____________________________________

## 2023-04-22 ENCOUNTER — APPOINTMENT (OUTPATIENT)
Dept: OCCUPATIONAL THERAPY | Facility: REHABILITATION | Age: 73
DRG: 560 | End: 2023-04-22
Attending: PHYSICAL MEDICINE & REHABILITATION
Payer: MEDICARE

## 2023-04-22 PROCEDURE — A9270 NON-COVERED ITEM OR SERVICE: HCPCS | Performed by: PHYSICAL MEDICINE & REHABILITATION

## 2023-04-22 PROCEDURE — 97110 THERAPEUTIC EXERCISES: CPT

## 2023-04-22 PROCEDURE — 97530 THERAPEUTIC ACTIVITIES: CPT

## 2023-04-22 PROCEDURE — 770010 HCHG ROOM/CARE - REHAB SEMI PRIVAT*

## 2023-04-22 PROCEDURE — 700102 HCHG RX REV CODE 250 W/ 637 OVERRIDE(OP): Performed by: HOSPITALIST

## 2023-04-22 PROCEDURE — 700111 HCHG RX REV CODE 636 W/ 250 OVERRIDE (IP): Performed by: PHYSICAL MEDICINE & REHABILITATION

## 2023-04-22 PROCEDURE — A9270 NON-COVERED ITEM OR SERVICE: HCPCS | Performed by: HOSPITALIST

## 2023-04-22 PROCEDURE — 700102 HCHG RX REV CODE 250 W/ 637 OVERRIDE(OP): Performed by: PHYSICAL MEDICINE & REHABILITATION

## 2023-04-22 PROCEDURE — 700101 HCHG RX REV CODE 250: Performed by: PHYSICAL MEDICINE & REHABILITATION

## 2023-04-22 RX ADMIN — FERROUS SULFATE TAB 325 MG (65 MG ELEMENTAL FE) 325 MG: 325 (65 FE) TAB at 09:04

## 2023-04-22 RX ADMIN — OXYCODONE HYDROCHLORIDE 5 MG: 5 TABLET ORAL at 02:49

## 2023-04-22 RX ADMIN — OXYCODONE HYDROCHLORIDE 10 MG: 10 TABLET ORAL at 09:05

## 2023-04-22 RX ADMIN — LIDOCAINE 1 PATCH: 50 PATCH TOPICAL at 21:04

## 2023-04-22 RX ADMIN — FERROUS SULFATE TAB 325 MG (65 MG ELEMENTAL FE) 325 MG: 325 (65 FE) TAB at 17:10

## 2023-04-22 RX ADMIN — SENNOSIDES AND DOCUSATE SODIUM 2 TABLET: 50; 8.6 TABLET ORAL at 21:04

## 2023-04-22 RX ADMIN — OMEPRAZOLE 20 MG: 20 CAPSULE, DELAYED RELEASE ORAL at 09:04

## 2023-04-22 RX ADMIN — TIZANIDINE 2 MG: 4 TABLET ORAL at 17:10

## 2023-04-22 RX ADMIN — ACETAMINOPHEN 650 MG: 325 TABLET ORAL at 23:00

## 2023-04-22 RX ADMIN — SENNOSIDES AND DOCUSATE SODIUM 2 TABLET: 50; 8.6 TABLET ORAL at 09:04

## 2023-04-22 RX ADMIN — OXYCODONE HYDROCHLORIDE 10 MG: 10 TABLET ORAL at 17:10

## 2023-04-22 RX ADMIN — ENOXAPARIN SODIUM 40 MG: 40 INJECTION SUBCUTANEOUS at 17:11

## 2023-04-22 NOTE — CARE PLAN
Problem: Fall Risk - Rehab  Goal: Patient will remain free from falls  Outcome: Progressing  Patient is compliant with requesting help to the bathroom and for transferring to wheelchair.     Problem: Pain - Standard  Goal: Alleviation of pain or a reduction in pain to the patient’s comfort goal  Outcome: Progressing  Patient has been requesting PRN pain medication when needed.    The patient is Stable - Low risk of patient condition declining or worsening    Shift Goals  Clinical Goals: Safety  Patient Goals: Pain management, sleep    Progress made toward(s) clinical / shift goals:  Progressing    Patient is not progressing towards the following goals:

## 2023-04-22 NOTE — THERAPY
Occupational Therapy  Daily Treatment     Patient Name: Lorena No  Age:  73 y.o., Sex:  female  Medical Record #: [id number]  Today's Date: 4/22/2023     Precautions  Precautions: Fall Risk, Weight Bearing As Tolerated Left Lower Extremity  Comments: LLE antalgia    Subjective    Pt pleasant and motivated to work w/ OT     Objective     04/22/23 1331   OT Charge Group   Charges Yes   OT Therapy Activity (Units) 3   OT Therapeutic Exercise (Units) 1   OT Total Time Spent   OT Individual Total Time Spent (Mins) 60   Precautions   Precautions Fall Risk;Weight Bearing As Tolerated Left Lower Extremity   Comments LLE antalgia   Vitals   O2 Delivery Device None - Room Air   Pain 0 - 10 Group   Therapist Pain Assessment   (Pt c/o LE stiffness)   Cognition    Level of Consciousness Alert   ABS (Agitated Behavior Scale)   Agitated Behavior Scale Performed No   Sleep/Wake Cycle   Sleep & Rest Awake   Sitting Upper Body Exercises   Sitting Upper Body Exercises Yes   Chest Press 3 sets of 10;Light Resistance Theraband   Tricep Press 3 sets of 10;Light Resistance Theraband   Elbow Extension 3 sets of 10;Light Resistance Theraband   IADL Treatments   IADL Treatments Kitchen mobility education;Home management   Kitchen Mobility Education   (Pt tolerated standing dish washing activity. Pt scooted baking dish w/ spatula from stove top, across counter, into sink. Pt able to pump soap into baking dish using bilat UEs. Pt leaned against sink for balance w/ CGA. Provided v/c for safe FWW mngmnt.)   Home Management   (Pt tolerated laundry task w/ FWW and CGA for balance. Pt able to reach into top-loading washer when she dropped the detergent cup. Pt able to bend down w/ CGA to load front-loading dryer. Pt able to reach into overhead cabinet to retrieve dryer sheet.)   Comments   (Pt tolerated grocery retrieval task w/ CGA for safety, FWW, and shopping cart. Pt able to reach high and low shelves, and place item into shopping cart. Pt  reported the lowest grocery shelf is about the level where her pt's food/water dishes are.)   Interdisciplinary Plan of Care Collaboration   Patient Position at End of Therapy Seated;Chair Alarm On;Call Light within Reach;Tray Table within Reach;Phone within Reach   Strengths & Barriers   Strengths Able to follow instructions;Alert and oriented;Good insight into deficits/needs;Independent prior level of function;Making steady progress towards goals;Manages pain appropriately;Motivated for self care and independence;Pleasant and cooperative;Supportive family;Willingly participates in therapeutic activities     Assessment    Pt seen for tx, addressing IADLs. Pt tolerated kitchen navigation, laundry, and grocery activity focused on overhead and below reaching. Pt tolerated activities well w/ CGA for balance/safety. Pt reported feeling more confident in reaching low in order to feed her pets. Continue OT POC.    Strengths: Able to follow instructions, Alert and oriented, Good insight into deficits/needs, Independent prior level of function, Making steady progress towards goals, Manages pain appropriately, Motivated for self care and independence, Pleasant and cooperative, Supportive family, Willingly participates in therapeutic activities    Plan    Standing balance  Higher level ADLs  Overhead and below reaching tasks  ADLs/functional transfers    Passport items to be completed:  Perform bathroom transfers, complete dressing, complete feeding, get ready for the day, prepare a simple meal, participate in household tasks, adapt home for safety needs, demonstrate home exercise program, complete caregiver training     Occupational Therapy Goals (Active)       Problem: Bathing       Dates: Start: 04/18/23         Goal: STG-Within one week, patient will bathe with CGA overall with AE/DME as needed.       Dates: Start: 04/18/23               Problem: Dressing       Dates: Start: 04/18/23         Goal: STG-Within one week,  patient will dress LB with CGA overall with AE/DME as needed.       Dates: Start: 04/18/23               Problem: Functional Transfers       Dates: Start: 04/18/23         Goal: STG-Within one week, patient will transfer to toilet with SBA overall with AE/DME as needed.       Dates: Start: 04/18/23               Problem: IADL's       Dates: Start: 04/18/23         Goal: STG-Within one week, patient will access kitchen area with SBA overall with AE/DME as needed.       Dates: Start: 04/18/23               Problem: OT Long Term Goals       Dates: Start: 04/18/23         Goal: LTG-By discharge, patient will complete basic self care tasks with mod I overall using AE/DME as needed.       Dates: Start: 04/18/23            Goal: LTG-By discharge, patient will perform bathroom transfers with mod I overall with AE/DME as needed.       Dates: Start: 04/18/23            Goal: LTG-By discharge, patient will complete basic home management with supervision-mod I overall using AE/DME as needed.       Dates: Start: 04/18/23               Problem: Toileting       Dates: Start: 04/18/23         Goal: STG-Within one week, patient will complete toileting tasks with CGA overall with AE/DME as needed.       Dates: Start: 04/18/23

## 2023-04-22 NOTE — CARE PLAN
The patient is Watcher - Medium risk of patient condition declining or worsening    Shift Goals  Clinical Goals: pain control  Patient Goals: Pain management, sleep      Problem: Fall Risk - Rehab  Goal: Patient will remain free from falls  Outcome: Progressing     Problem: Pain - Standard  Goal: Alleviation of pain or a reduction in pain to the patient’s comfort goal  Outcome: Progressing

## 2023-04-23 ENCOUNTER — APPOINTMENT (OUTPATIENT)
Dept: PHYSICAL THERAPY | Facility: REHABILITATION | Age: 73
DRG: 560 | End: 2023-04-23
Attending: PHYSICAL MEDICINE & REHABILITATION
Payer: MEDICARE

## 2023-04-23 ENCOUNTER — APPOINTMENT (OUTPATIENT)
Dept: OCCUPATIONAL THERAPY | Facility: REHABILITATION | Age: 73
DRG: 560 | End: 2023-04-23
Attending: PHYSICAL MEDICINE & REHABILITATION
Payer: MEDICARE

## 2023-04-23 PROCEDURE — 97116 GAIT TRAINING THERAPY: CPT

## 2023-04-23 PROCEDURE — 97530 THERAPEUTIC ACTIVITIES: CPT

## 2023-04-23 PROCEDURE — A9270 NON-COVERED ITEM OR SERVICE: HCPCS | Performed by: PHYSICAL MEDICINE & REHABILITATION

## 2023-04-23 PROCEDURE — 700102 HCHG RX REV CODE 250 W/ 637 OVERRIDE(OP): Performed by: HOSPITALIST

## 2023-04-23 PROCEDURE — 700101 HCHG RX REV CODE 250: Performed by: PHYSICAL MEDICINE & REHABILITATION

## 2023-04-23 PROCEDURE — A9270 NON-COVERED ITEM OR SERVICE: HCPCS | Performed by: HOSPITALIST

## 2023-04-23 PROCEDURE — 97110 THERAPEUTIC EXERCISES: CPT

## 2023-04-23 PROCEDURE — 770010 HCHG ROOM/CARE - REHAB SEMI PRIVAT*

## 2023-04-23 PROCEDURE — 700102 HCHG RX REV CODE 250 W/ 637 OVERRIDE(OP): Performed by: PHYSICAL MEDICINE & REHABILITATION

## 2023-04-23 PROCEDURE — 700111 HCHG RX REV CODE 636 W/ 250 OVERRIDE (IP): Performed by: PHYSICAL MEDICINE & REHABILITATION

## 2023-04-23 RX ADMIN — LIDOCAINE 1 PATCH: 50 PATCH TOPICAL at 20:08

## 2023-04-23 RX ADMIN — OXYCODONE HYDROCHLORIDE 10 MG: 10 TABLET ORAL at 02:32

## 2023-04-23 RX ADMIN — SENNOSIDES AND DOCUSATE SODIUM 2 TABLET: 50; 8.6 TABLET ORAL at 20:08

## 2023-04-23 RX ADMIN — FERROUS SULFATE TAB 325 MG (65 MG ELEMENTAL FE) 325 MG: 325 (65 FE) TAB at 17:18

## 2023-04-23 RX ADMIN — FERROUS SULFATE TAB 325 MG (65 MG ELEMENTAL FE) 325 MG: 325 (65 FE) TAB at 08:40

## 2023-04-23 RX ADMIN — OMEPRAZOLE 20 MG: 20 CAPSULE, DELAYED RELEASE ORAL at 08:40

## 2023-04-23 RX ADMIN — OXYCODONE HYDROCHLORIDE 10 MG: 10 TABLET ORAL at 22:14

## 2023-04-23 RX ADMIN — SENNOSIDES AND DOCUSATE SODIUM 2 TABLET: 50; 8.6 TABLET ORAL at 08:40

## 2023-04-23 RX ADMIN — ACETAMINOPHEN 650 MG: 325 TABLET ORAL at 17:18

## 2023-04-23 RX ADMIN — OXYCODONE HYDROCHLORIDE 5 MG: 5 TABLET ORAL at 05:57

## 2023-04-23 RX ADMIN — ENOXAPARIN SODIUM 40 MG: 40 INJECTION SUBCUTANEOUS at 17:18

## 2023-04-23 ASSESSMENT — ACTIVITIES OF DAILY LIVING (ADL)
BED_CHAIR_WHEELCHAIR_TRANSFER_DESCRIPTION: ADAPTIVE EQUIPMENT
TOILET_TRANSFER_DESCRIPTION: GRAB BAR

## 2023-04-23 ASSESSMENT — PAIN SCALES - WONG BAKER: WONGBAKER_NUMERICALRESPONSE: HURTS JUST A LITTLE BIT

## 2023-04-23 ASSESSMENT — PATIENT HEALTH QUESTIONNAIRE - PHQ9
2. FEELING DOWN, DEPRESSED, IRRITABLE, OR HOPELESS: NOT AT ALL
1. LITTLE INTEREST OR PLEASURE IN DOING THINGS: NOT AT ALL
SUM OF ALL RESPONSES TO PHQ9 QUESTIONS 1 AND 2: 0

## 2023-04-23 ASSESSMENT — GAIT ASSESSMENTS
ASSISTIVE DEVICE: FRONT WHEEL WALKER
DEVIATION: ANTALGIC;STEP TO;BRADYKINETIC
DISTANCE (FEET): 100
GAIT LEVEL OF ASSIST: CONTACT GUARD ASSIST

## 2023-04-23 ASSESSMENT — PAIN DESCRIPTION - PAIN TYPE: TYPE: ACUTE PAIN

## 2023-04-23 NOTE — THERAPY
Occupational Therapy  Daily Treatment     Patient Name: Lorena No  Age:  73 y.o., Sex:  female  Medical Record #: [id number]  Today's Date: 4/23/2023     Precautions  Precautions: (P) Fall Risk, Weight Bearing As Tolerated Left Lower Extremity  Comments: (P) LLE antalgia         Subjective    Pt seated in wc upon arrival, agreeable to OT session.     Objective       04/23/23 1301   OT Charge Group   OT Therapy Activity (Units) 4   OT Total Time Spent   OT Individual Total Time Spent (Mins) 60   Precautions   Precautions Fall Risk;Weight Bearing As Tolerated Left Lower Extremity   Comments LLE antalgia   Functional Level of Assist   Bed, Chair, Wheelchair Transfer Standby Assist   Balance   Standing Balance (Static) Fair   Standing Balance (Dynamic) Fair -   Comments Pt completed 3 standing balance activites at elevated table. Pt completed 2 large floor puzzles on table with increased time and RB between each puzzle. Pt utiized UE support as needed, though able to reach across table to place adn grab pieces with SBA overall. Pt then completed memory game with this therapist. Pt with impaired recall throughout the game, though with min cueing able to pair up several tiles.   Bed Mobility    Sit to Supine Minimal Assist   Interdisciplinary Plan of Care Collaboration   Patient Position at End of Therapy In Bed;Bed Alarm On;Call Light within Reach;Tray Table within Reach;Phone within Reach       Completed functional mobility at FWW with SBA overall from room to main gym.    Assessment    Pt with increased tolerance for standing, though req seated RB between activities. This strategy should be taken for carryover to home upon dc. Pt motivated to complete all activities this date and would cont to benefit from recall/memory activities and overall balance.    Strengths: Able to follow instructions, Alert and oriented, Good insight into deficits/needs, Independent prior level of function, Making steady progress towards  goals, Manages pain appropriately, Motivated for self care and independence, Pleasant and cooperative, Supportive family, Willingly participates in therapeutic activities    Plan    Standing balance  Higher level ADLs  Overhead and below reaching tasks  ADLs/functional transfers      Occupational Therapy Goals (Active)       Problem: Bathing       Dates: Start: 04/18/23         Goal: STG-Within one week, patient will bathe with CGA overall with AE/DME as needed.       Dates: Start: 04/18/23               Problem: Dressing       Dates: Start: 04/18/23         Goal: STG-Within one week, patient will dress LB with CGA overall with AE/DME as needed.       Dates: Start: 04/18/23               Problem: Functional Transfers       Dates: Start: 04/18/23         Goal: STG-Within one week, patient will transfer to toilet with SBA overall with AE/DME as needed.       Dates: Start: 04/18/23               Problem: IADL's       Dates: Start: 04/18/23         Goal: STG-Within one week, patient will access kitchen area with SBA overall with AE/DME as needed.       Dates: Start: 04/18/23               Problem: OT Long Term Goals       Dates: Start: 04/18/23         Goal: LTG-By discharge, patient will complete basic self care tasks with mod I overall using AE/DME as needed.       Dates: Start: 04/18/23            Goal: LTG-By discharge, patient will perform bathroom transfers with mod I overall with AE/DME as needed.       Dates: Start: 04/18/23            Goal: LTG-By discharge, patient will complete basic home management with supervision-mod I overall using AE/DME as needed.       Dates: Start: 04/18/23               Problem: Toileting       Dates: Start: 04/18/23         Goal: STG-Within one week, patient will complete toileting tasks with CGA overall with AE/DME as needed.       Dates: Start: 04/18/23

## 2023-04-23 NOTE — THERAPY
Physical Therapy   Daily Treatment     Patient Name: Lorena No  Age:  73 y.o., Sex:  female  Medical Record #: [id number]  Today's Date: 4/23/2023     Precautions  Precautions: (P) Fall Risk, Weight Bearing As Tolerated Left Lower Extremity  Comments: (P) LLE antalgia    Subjective    I did not sleep welll. I am very tired. I don't feel like I can do much.     Objective       04/23/23 0831   PT Charge Group   PT Gait Training (Units) 2   PT Therapeutic Exercise (Units) 1   PT Therapeutic Activities (Units) 1   PT Total Time Spent   PT Individual Total Time Spent (Mins) 60   Precautions   Precautions Fall Risk;Weight Bearing As Tolerated Left Lower Extremity   Comments LLE antalgia   Vitals   Pulse 89   Blood Pressure  93/61   Pulse Oximetry 95 %   O2 (LPM) 0   O2 Delivery Device None - Room Air   Vitals Comments post gait   Pain 0 - 10 Group   Location Leg   Location Orientation Left   Pain Rating Scale (NPRS) 6   Description Aching   Therapist Pain Assessment During Activity   Cognition    Level of Consciousness Alert   Sleep/Wake Cycle   Sleep & Rest Awake   Gait Functional Level of Assist    Gait Level Of Assist Contact Guard Assist   Assistive Device Front Wheel Walker   Distance (Feet) 100  (reports of sweating and need to use restroom during gait training)   # of Times Distance was Traveled 1   Deviation Antalgic;Step To;Bradykinetic   Stairs Functional Level of Assist   Level of Assist with Stairs Unable to Participate  (light headedness)   Transfer Functional Level of Assist   Bed, Chair, Wheelchair Transfer Standby Assist   Bed Chair Wheelchair Transfer Description Adaptive equipment   Toilet Transfers Standby Assist   Toilet Transfer Description Grab bar   Supine Lower Body Exercise   Hip Abduction 2 sets of 10;Left  (AAROM)   Short Arc Quad 2 sets of 10;Left   Ankle Pumps 2 sets of 10   Gluteal Isometrics 2 sets of 10;Bilateral   Quadriceps Isometrics 2 sets of 10   Bed Mobility    Sit to Supine Minimal  Assist   Sit to Stand Standby Assist   Scooting Supervised   Neuro-Muscular Treatments   Neuro-Muscular Treatments Biofeedback;Postural Facilitation;Sequencing   Comments gait training in // bars with mirror to increase stride length and weightbearing LLE, forward and backwards- 4 laps- limited by c/o light headedness. Vitals taken.   Interdisciplinary Plan of Care Collaboration   IDT Collaboration with  Nursing   Patient Position at End of Therapy In Bed;Bed Alarm On;Call Light within Reach;Tray Table within Reach;Phone within Reach   Collaboration Comments low BP, c/o weakness         Assessment    Patient with limited stamina today. C/o weakness, light headedness with all standing activities. Vitals taken- BP low end of normal- RN notified. Patient able to increase WB through LLE with mirror, but limited follow-through due to pain. Poor night's sleep. Planning to rest prior to next therapy.     Strengths: Able to follow instructions, Alert and oriented, Effective communication skills, Good insight into deficits/needs, Independent prior level of function, Making steady progress towards goals, Motivated for self care and independence, Pleasant and cooperative, Willingly participates in therapeutic activities  Barriers: Decreased endurance, Generalized weakness, Home accessibility, Impaired balance, Limited mobility, Pain, Poor family support    Plan    LLE weight acceptance, FWW ambulation progression outdoors (trial 4WW?), LLE strengthening, initiate car transfers, stairs, standing balance with decreased UE support       Passport items to be completed:  Get in/out of bed safely, in/out of a vehicle, safely use mobility device, walk or wheel around home/community, navigate up and down stairs, show how to get up/down from the ground, ensure home is accessible, demonstrate HEP, complete caregiver training    Physical Therapy Problems (Active)       Problem: Balance       Dates: Start: 04/20/23         Goal:  STG-Within one week, patient will perform standing home exercise program with handout at SPV level.       Dates: Start: 04/20/23               Problem: Mobility       Dates: Start: 04/18/23         Goal: STG-Within one week, patient will ambulate 2x 50 feet with FWW at SBA level.       Dates: Start: 04/18/23         Goal Note filed on 04/20/23 0846 by Gomez Antoine, PT       PT evaluation completed two days ago on 4/18/23.  Will monitor progress towards STGs.                 Problem: Mobility Transfers       Dates: Start: 04/18/23         Goal: STG-Within one week, patient will perform bed mobility at SBA level including BLE management in/out of bed.       Dates: Start: 04/18/23         Goal Note filed on 04/20/23 0846 by Gomez Antoine, PT       PT evaluation completed two days ago on 4/18/23.  Will monitor progress towards STGs.              Goal: STG-Within one week, patient will sit to stand at SBA level with FWW.       Dates: Start: 04/18/23         Goal Note filed on 04/20/23 0846 by Gomez Antoine, PT       PT evaluation completed two days ago on 4/18/23.  Will monitor progress towards STGs.              Goal: STG-Within one week, patient will transfer bed to chair at SBA level with FWW       Dates: Start: 04/18/23         Goal Note filed on 04/20/23 0846 by Gomez Antoine, PT       PT evaluation completed two days ago on 4/18/23.  Will monitor progress towards STGs.                 Problem: PT-Long Term Goals       Dates: Start: 04/18/23         Goal: LTG-By discharge, patient will ambulate 150 feet with FWW at IND level indoors & outdoors.       Dates: Start: 04/18/23            Goal: LTG-By discharge, patient will transfer one surface to another with FWW at IND level.       Dates: Start: 04/18/23            Goal: LTG-By discharge, patient will perform home exercise program with handout at IND level.       Dates: Start: 04/18/23            Goal: LTG-By discharge, patient will  ambulate up/down 4 stairs with B railings at IND level.       Dates: Start: 04/18/23            Goal: LTG-By discharge, patient will transfer in/out of a car with FWW at IND level.       Dates: Start: 04/18/23

## 2023-04-23 NOTE — CARE PLAN
The patient is Stable - Low risk of patient condition declining or worsening    Shift Goals  Clinical Goals: pain management  Patient Goals: Sleep, pain management    Progress made toward(s) clinical / shift goals:    Problem: Pain - Standard  Goal: Alleviation of pain or a reduction in pain to the patient’s comfort goal  Outcome: Progressing     Patient able to verbalize pain level and verbalize an acceptable level of pain.

## 2023-04-23 NOTE — THERAPY
Recreational Therapy  Daily Treatment     Patient Name: Lorena No  AGE:  73 y.o., SEX:  female  Medical Record #: [id number]  Today's Date: 4/23/2023       Subjective    Patient was ready and agreeable to recreation therapy session.      Objective       04/23/23 1031   Procedural Tracking   Procedural Tracking Community Re-Integration;Community Skills Development;Leisure Skills Awareness;Leisure Skills Development;Social Skills Training;Cognitive Skills Training;Gross Motor Functional Leisure Skills;Fine Motor Functional Leisure Skills;Group Treatment   Treatment Time   Total Time Spent (mins) 60   Total Time Missed 0   Functional Ability Status - Cognitive   Attention Span Remains on Task   Comprehension Follows Two Step Commands;Follows One Step Commands   Judgment Able to Make Independent Decisions   Functional Ability Status - Emotional    Affect Appropriate   Mood Appropriate   Behavior Appropriate;Cooperative   Skilled Intervention    Skilled Intervention Gross Motor Leisure;Fine Motor Leisure;Cognitive Leisure;Group Participation;Social Skills   Skilled Intervention Comments Amber   Interdisciplinary Plan of Care Collaboration   IDT Collaboration with  Recreation Therapist   Patient Position at End of Therapy Seated;Phone within Reach;Tray Table within Reach;Call Light within Reach   Strengths & Barriers   Strengths Able to follow instructions;Alert and oriented;Willingly participates in therapeutic activities;Pleasant and cooperative;Motivated for self care and independence   Barriers Decreased endurance;Fatigue;Generalized weakness;Impaired activity tolerance;Impaired balance;Pain;Limited mobility         Assessment    Patient participated in new game, [employer], during recreation therapy group session. Patient required mod cues for scanning, sequencing and strategy. Patient was socially acceptable throughout session.     Strengths: (P) Able to follow instructions, Alert and oriented, Willingly  participates in therapeutic activities, Pleasant and cooperative, Motivated for self care and independence  Barriers: (P) Decreased endurance, Fatigue, Generalized weakness, Impaired activity tolerance, Impaired balance, Pain, Limited mobility    Plan    Patient will benefit continued recreation therapy session.     Passport items to be completed:  Verbalize two positive leisure activities, discuss returning to work, hobbies, community groups or volunteer activities, explore community resources

## 2023-04-23 NOTE — CARE PLAN
Problem: Fall Risk - Rehab  Goal: Patient will remain free from falls  Outcome: Progressing  Pt has been using call light to request assistance for out-of-bed and to go to the toilet.  Pt verbalized understanding of falls prevention rationale.     Problem: Pain - Standard  Goal: Alleviation of pain or a reduction in pain to the patient’s comfort goal  Outcome: Progressing  Pt has been proactive in communicating with the RN in managing her pain.    The patient is Stable - Low risk of patient condition declining or worsening    Shift Goals  Clinical Goals: Safety, pain management  Patient Goals: Sleep, pain management    Progress made toward(s) clinical / shift goals:  Progressing    Patient is not progressing towards the following goals:

## 2023-04-24 ENCOUNTER — APPOINTMENT (OUTPATIENT)
Dept: PHYSICAL THERAPY | Facility: REHABILITATION | Age: 73
DRG: 560 | End: 2023-04-24
Attending: PHYSICAL MEDICINE & REHABILITATION
Payer: MEDICARE

## 2023-04-24 ENCOUNTER — APPOINTMENT (OUTPATIENT)
Dept: OCCUPATIONAL THERAPY | Facility: REHABILITATION | Age: 73
DRG: 560 | End: 2023-04-24
Attending: PHYSICAL MEDICINE & REHABILITATION
Payer: MEDICARE

## 2023-04-24 PROCEDURE — 99232 SBSQ HOSP IP/OBS MODERATE 35: CPT | Performed by: PHYSICAL MEDICINE & REHABILITATION

## 2023-04-24 PROCEDURE — 97535 SELF CARE MNGMENT TRAINING: CPT

## 2023-04-24 PROCEDURE — 700102 HCHG RX REV CODE 250 W/ 637 OVERRIDE(OP): Performed by: HOSPITALIST

## 2023-04-24 PROCEDURE — 97110 THERAPEUTIC EXERCISES: CPT

## 2023-04-24 PROCEDURE — 700111 HCHG RX REV CODE 636 W/ 250 OVERRIDE (IP): Performed by: PHYSICAL MEDICINE & REHABILITATION

## 2023-04-24 PROCEDURE — 97530 THERAPEUTIC ACTIVITIES: CPT

## 2023-04-24 PROCEDURE — A9270 NON-COVERED ITEM OR SERVICE: HCPCS | Performed by: PHYSICAL MEDICINE & REHABILITATION

## 2023-04-24 PROCEDURE — 97116 GAIT TRAINING THERAPY: CPT

## 2023-04-24 PROCEDURE — A9270 NON-COVERED ITEM OR SERVICE: HCPCS | Performed by: HOSPITALIST

## 2023-04-24 PROCEDURE — 700101 HCHG RX REV CODE 250: Performed by: PHYSICAL MEDICINE & REHABILITATION

## 2023-04-24 PROCEDURE — 700102 HCHG RX REV CODE 250 W/ 637 OVERRIDE(OP): Performed by: PHYSICAL MEDICINE & REHABILITATION

## 2023-04-24 PROCEDURE — 770010 HCHG ROOM/CARE - REHAB SEMI PRIVAT*

## 2023-04-24 RX ADMIN — OXYCODONE HYDROCHLORIDE 5 MG: 5 TABLET ORAL at 07:41

## 2023-04-24 RX ADMIN — OXYCODONE HYDROCHLORIDE 5 MG: 5 TABLET ORAL at 04:28

## 2023-04-24 RX ADMIN — OMEPRAZOLE 20 MG: 20 CAPSULE, DELAYED RELEASE ORAL at 07:41

## 2023-04-24 RX ADMIN — LIDOCAINE 1 PATCH: 50 PATCH TOPICAL at 21:13

## 2023-04-24 RX ADMIN — TIZANIDINE 2 MG: 4 TABLET ORAL at 17:00

## 2023-04-24 RX ADMIN — ENOXAPARIN SODIUM 40 MG: 40 INJECTION SUBCUTANEOUS at 17:00

## 2023-04-24 RX ADMIN — TRAZODONE HYDROCHLORIDE 50 MG: 50 TABLET ORAL at 22:50

## 2023-04-24 RX ADMIN — FERROUS SULFATE TAB 325 MG (65 MG ELEMENTAL FE) 325 MG: 325 (65 FE) TAB at 07:41

## 2023-04-24 RX ADMIN — SENNOSIDES AND DOCUSATE SODIUM 2 TABLET: 50; 8.6 TABLET ORAL at 07:41

## 2023-04-24 RX ADMIN — MAGNESIUM HYDROXIDE 30 ML: 1200 LIQUID ORAL at 07:41

## 2023-04-24 RX ADMIN — OXYCODONE HYDROCHLORIDE 10 MG: 10 TABLET ORAL at 11:18

## 2023-04-24 RX ADMIN — TIZANIDINE 2 MG: 4 TABLET ORAL at 07:41

## 2023-04-24 RX ADMIN — OXYCODONE HYDROCHLORIDE 5 MG: 5 TABLET ORAL at 17:00

## 2023-04-24 RX ADMIN — FERROUS SULFATE TAB 325 MG (65 MG ELEMENTAL FE) 325 MG: 325 (65 FE) TAB at 17:00

## 2023-04-24 RX ADMIN — SENNOSIDES AND DOCUSATE SODIUM 2 TABLET: 50; 8.6 TABLET ORAL at 21:12

## 2023-04-24 ASSESSMENT — GAIT ASSESSMENTS
ASSISTIVE DEVICE: FRONT WHEEL WALKER
DEVIATION: ANTALGIC;STEP TO;SHUFFLED GAIT;DECREASED HEEL STRIKE;DECREASED TOE OFF
ASSISTIVE DEVICE: FRONT WHEEL WALKER
DEVIATION: ANTALGIC;STEP TO
DISTANCE (FEET): 50
GAIT LEVEL OF ASSIST: SUPERVISED
GAIT LEVEL OF ASSIST: SUPERVISED
DISTANCE (FEET): 150

## 2023-04-24 ASSESSMENT — ACTIVITIES OF DAILY LIVING (ADL)
BED_CHAIR_WHEELCHAIR_TRANSFER_DESCRIPTION: ADAPTIVE EQUIPMENT;INCREASED TIME;VERBAL CUEING;SUPERVISION FOR SAFETY
BED_CHAIR_WHEELCHAIR_TRANSFER_DESCRIPTION: ADAPTIVE EQUIPMENT;SUPERVISION FOR SAFETY;INCREASED TIME
TOILET_TRANSFER_DESCRIPTION: ADAPTIVE EQUIPMENT;GRAB BAR;INCREASED TIME;SUPERVISION FOR SAFETY

## 2023-04-24 NOTE — THERAPY
Occupational Therapy  Daily Treatment     Patient Name: Lorena No  Age:  73 y.o., Sex:  female  Medical Record #: [id number]  Today's Date: 4/24/2023     Precautions  Precautions: Fall Risk, Weight Bearing As Tolerated Left Lower Extremity  Comments: LLE antalgia         Subjective    Pt seated in wc upon arrival, agreeable to OT session.     Objective       04/24/23 1401   OT Charge Group   OT Self Care / ADL (Units) 1   OT Therapeutic Exercise (Units) 1   OT Total Time Spent   OT Individual Total Time Spent (Mins) 30   Precautions   Precautions Fall Risk;Weight Bearing As Tolerated Left Lower Extremity   Comments LLE antalgia   Functional Level of Assist   Toileting Contact Guard Assist   Toilet Transfers Contact Guard Assist   Sitting Upper Body Exercises   Upper Extremity Bike Level 4 Resistance  (10 min overall, 5 min forward, 5 min backward)   Interdisciplinary Plan of Care Collaboration   Patient Position at End of Therapy Seated;Chair Alarm On;Self Releasing Lap Belt Applied;Call Light within Reach;Tray Table within Reach;Phone within Reach         Assessment    Pt with increased pain in LLE this session, ice applied during session and RN aware. Pt req CGA for transfer to toileting d/t inability to place weight into LLE. Pt tolerated UE therex this date, and would cont to benefit from OT services focused on overall endurance, standing balance as able, and endurance.    Strengths: Able to follow instructions, Alert and oriented, Good insight into deficits/needs, Independent prior level of function, Making steady progress towards goals, Manages pain appropriately, Motivated for self care and independence, Pleasant and cooperative, Supportive family, Willingly participates in therapeutic activities    Plan    ADLs, transfers, functional mobility with FWW, standing tolerance/balance with accepting more weight through L LE  Overhead and below reaching tasks      Occupational Therapy Goals (Active)        Problem: Bathing       Dates: Start: 04/18/23         Goal: STG-Within one week, patient will bathe with CGA overall with AE/DME as needed.       Dates: Start: 04/18/23               Problem: Dressing       Dates: Start: 04/18/23         Goal: STG-Within one week, patient will dress LB with CGA overall with AE/DME as needed.       Dates: Start: 04/18/23               Problem: Functional Transfers       Dates: Start: 04/18/23         Goal: STG-Within one week, patient will transfer to toilet with SBA overall with AE/DME as needed.       Dates: Start: 04/18/23               Problem: IADL's       Dates: Start: 04/18/23         Goal: STG-Within one week, patient will access kitchen area with SBA overall with AE/DME as needed.       Dates: Start: 04/18/23               Problem: OT Long Term Goals       Dates: Start: 04/18/23         Goal: LTG-By discharge, patient will complete basic self care tasks with mod I overall using AE/DME as needed.       Dates: Start: 04/18/23            Goal: LTG-By discharge, patient will perform bathroom transfers with mod I overall with AE/DME as needed.       Dates: Start: 04/18/23            Goal: LTG-By discharge, patient will complete basic home management with supervision-mod I overall using AE/DME as needed.       Dates: Start: 04/18/23               Problem: Toileting       Dates: Start: 04/18/23         Goal: STG-Within one week, patient will complete toileting tasks with CGA overall with AE/DME as needed.       Dates: Start: 04/18/23

## 2023-04-24 NOTE — THERAPY
"Physical Therapy   Daily Treatment     Patient Name: Lorena No  Age:  73 y.o., Sex:  female  Medical Record #: [id number]  Today's Date: 4/24/2023     Precautions  Precautions: (P) Fall Risk, Weight Bearing As Tolerated Left Lower Extremity  Comments: LLE antalgia    Subjective    \"I'm a little tired today-- I didn't sleep well.\" Pt in bed at arrival, agreeable to PT tx.   Seen for 2 sessions:  0830-0930  [id number]     Objective  0830-0930 04/24/23 0831   PT Charge Group   PT Gait Training (Units) 1   PT Therapeutic Exercise (Units) 2   PT Therapeutic Activities (Units) 1   PT Total Time Spent   PT Individual Total Time Spent (Mins) 60   Gait Functional Level of Assist    Gait Level Of Assist Supervised   Assistive Device Front Wheel Walker   Distance (Feet) 150   # of Times Distance was Traveled 1  (1 x 20')   Deviation Antalgic;Step To;Shuffled Gait;Decreased Heel Strike;Decreased Toe Off  (dec knee ext in L stance)   Transfer Functional Level of Assist   Bed, Chair, Wheelchair Transfer Standby Assist   Bed Chair Wheelchair Transfer Description Adaptive equipment;Supervision for safety;Increased time   Toilet Transfers Supervised   Toilet Transfer Description Adaptive equipment;Grab bar;Increased time;Supervision for safety   Supine Lower Body Exercise   Hip Abduction 3 sets of 10;Bilateral  (strap to assist LLE)   Short Arc Quad 3 sets of 10;Left  (strap to assist c/ LLE)   Heel Slide 3 sets of 10;Bilateral  (strap to assist with LLE)   Ankle Pumps 1 set of 10   Sitting Lower Body Exercises   Nustep Resistance Level 3  (BLE, BUE; 10'00\" total)   Standing Lower Body Exercises   Other Exercises standing TKE 1 x 8 @ 5\" holds c/ MC at posterior knee to facilitate quad contraction   Bed Mobility    Supine to Sit Supervised   Sit to Stand Supervised   Scooting Supervised   Rolling Supervised   Neuro-Muscular Treatments   Neuro-Muscular Treatments Co-Contraction;Facilitation  (c/ TE as above to facilitate quad " "contraction)   Interdisciplinary Plan of Care Collaboration   Patient Position at End of Therapy Seated;Chair Alarm On;Call Light within Reach;Tray Table within Reach;Phone within Reach     TA: toilet transfer, seated and standing balance for UB/LB dressing, hand hygiene, pericare; SetupA for dressing with review of sock jacinto prior to practice.    Education: rationale for supine TE and focus on knee extension/mm setting to improve stability and WB tolerance in L stance. Plan to wean from w/c to prep for FWW level at home. Pt reports understanding & agreement.    [id number]     04/24/23 1514   PT Charge Group   PT Gait Training (Units) 1   PT Therapeutic Exercise (Units) 1   PT Total Time Spent   PT Individual Total Time Spent (Mins) 30   Gait Functional Level of Assist    Gait Level Of Assist Supervised   Assistive Device Front Wheel Walker   Distance (Feet) 50   # of Times Distance was Traveled 2  (1 x 30')   Deviation Antalgic;Step To   Stairs Functional Level of Assist   Level of Assist with Stairs Contact Guard Assist   # of Stairs Climbed 12  (2 x 6 @ 4\" c/ BHR)   Stairs Description Extra time;Hand rails;Verbal cueing   Transfer Functional Level of Assist   Bed, Chair, Wheelchair Transfer Supervised   Bed Chair Wheelchair Transfer Description Adaptive equipment;Increased time;Verbal cueing;Supervision for safety  (stand pivot/step around c/ FWW)   Standing Lower Body Exercises   Hip Abduction 1 set of 15;Bilateral  (c/ towel slide/powder board for dec friction to inc ROM)   Other Exercises hip flexion/ext 1 x 15 each c/ towel slide for dec friction on floor; standing TKE c/ GTB resistance 1 x 20 @ 3\" holds, 10\" hold last rep; standing DF stretch on incline 1 x 1 min LLE   Neuro-Muscular Treatments   Neuro-Muscular Treatments Anterior weight shift;Weight Shift Right;Weight Shift Left;Tactile Cuing;Sequencing;Postural Facilitation  (c/ standing TE and gait; cues to facilitate reciprocal step pattern/length) "   Interdisciplinary Plan of Care Collaboration   Patient Position at End of Therapy Seated;Chair Alarm On;Self Releasing Lap Belt Applied;Call Light within Reach;Tray Table within Reach;Phone within Reach     Progression of gait c/ focus on reciprocal stepping, WB on LLE vs hop to technique over household distances.  Stairs practice as above, VC for step to favoring LLE.   NMRE/TE: in standing to promote LLE WB tolerance, ROM for increased step length; reps/sets as above.   Seated rests btw activities.     Assessment    Latoya improving in WB tolerance, overall activity tolerance. Continues c/ poor L knee ext control and endurance in stance. Will benefit from continued review and progression of TE to improve NM control and stance stability.     Strengths: Able to follow instructions, Alert and oriented, Effective communication skills, Good insight into deficits/needs, Independent prior level of function, Making steady progress towards goals, Motivated for self care and independence, Pleasant and cooperative, Willingly participates in therapeutic activities  Barriers: Decreased endurance, Generalized weakness, Home accessibility, Impaired balance, Limited mobility, Pain, Poor family support    Plan  Review supine and standing TE for LLE strength/AG ROM  LLE weight acceptance, FWW ambulation progression outdoors (trial 4WW?), LLE strengthening, initiate car transfers, stairs, standing balance with decreased UE support     Passport items to be completed:  Get in/out of bed safely, in/out of a vehicle, safely use mobility device, walk or wheel around home/community, navigate up and down stairs, show how to get up/down from the ground, ensure home is accessible, demonstrate HEP, complete caregiver training    Physical Therapy Problems (Active)       Problem: Balance       Dates: Start: 04/20/23         Goal: STG-Within one week, patient will perform standing home exercise program with handout at SPV level.       Dates:  Start: 04/20/23               Problem: Mobility       Dates: Start: 04/18/23         Goal: STG-Within one week, patient will ambulate 2x 50 feet with FWW at SBA level.       Dates: Start: 04/18/23         Goal Note filed on 04/20/23 0846 by Gomez Antoine, PT       PT evaluation completed two days ago on 4/18/23.  Will monitor progress towards STGs.                 Problem: Mobility Transfers       Dates: Start: 04/18/23         Goal: STG-Within one week, patient will perform bed mobility at SBA level including BLE management in/out of bed.       Dates: Start: 04/18/23         Goal Note filed on 04/20/23 0846 by Gomez Antoine, PT       PT evaluation completed two days ago on 4/18/23.  Will monitor progress towards STGs.              Goal: STG-Within one week, patient will sit to stand at SBA level with FWW.       Dates: Start: 04/18/23         Goal Note filed on 04/20/23 0846 by Gomez Antoine, PT       PT evaluation completed two days ago on 4/18/23.  Will monitor progress towards STGs.              Goal: STG-Within one week, patient will transfer bed to chair at SBA level with FWW       Dates: Start: 04/18/23         Goal Note filed on 04/20/23 0846 by Gomez Antoine, PT       PT evaluation completed two days ago on 4/18/23.  Will monitor progress towards STGs.                 Problem: PT-Long Term Goals       Dates: Start: 04/18/23         Goal: LTG-By discharge, patient will ambulate 150 feet with FWW at IND level indoors & outdoors.       Dates: Start: 04/18/23            Goal: LTG-By discharge, patient will transfer one surface to another with FWW at IND level.       Dates: Start: 04/18/23            Goal: LTG-By discharge, patient will perform home exercise program with handout at IND level.       Dates: Start: 04/18/23            Goal: LTG-By discharge, patient will ambulate up/down 4 stairs with B railings at IND level.       Dates: Start: 04/18/23            Goal: LTG-By  discharge, patient will transfer in/out of a car with FWW at IND level.       Dates: Start: 04/18/23

## 2023-04-24 NOTE — PROGRESS NOTES
"  Physical Medicine & Rehabilitation Progress Note    Encounter Date: 4/24/2023    Chief Complaint: Decreased mobility, hip pain    Interval Events (Subjective):  Patient sitting up in room. She reports therapy is going well. She did require oxycodone 10 mg today. She reports she was hurting a little more than usual. Denies NVD.     _____________________________________  Interdisciplinary Team Conference   Most recent IDT on 4/20/2023     Discharge Date/Disposition:  4/29/23  _____________________________________      Objective:  VITAL SIGNS: /72   Pulse 90   Temp 36.6 °C (97.8 °F) (Oral)   Resp 18   Ht 1.575 m (5' 2\")   Wt 57.1 kg (125 lb 14.4 oz)   SpO2 97%   BMI 23.03 kg/m²   Gen: NAD  Psych: Mood and affect appropriate  CV: RRR, 0 edema  Resp: CTAB, no upper airway sounds  Abd: NTND  Neuro: AOx4, following commands    Laboratory Values:  No results found for this or any previous visit (from the past 72 hour(s)).      Medications:  Scheduled Medications   Medication Dose Frequency    ferrous sulfate  325 mg BID WITH MEALS    Pharmacy Consult Request  1 Each PHARMACY TO DOSE    senna-docusate  2 Tablet BID    omeprazole  20 mg DAILY    enoxaparin (LOVENOX) injection  40 mg DAILY AT 1800    lidocaine  1 Patch Q24HR     PRN medications: tizanidine, Respiratory Therapy Consult, hydrALAZINE, senna-docusate **AND** polyethylene glycol/lytes **AND** magnesium hydroxide **AND** bisacodyl, mag hydrox-al hydrox-simeth, ondansetron **OR** ondansetron, traZODone, sodium chloride, oxyCODONE immediate-release **OR** oxyCODONE immediate-release, [COMPLETED] celecoxib **FOLLOWED BY** celecoxib, acetaminophen    Diet:  Current Diet Order   Procedures    Diet Order Diet: Level 6 - Soft and Bite Sized; Liquid level: Level 0 - Thin       Medical Decision Making and Plan:  Left hip fracture - Patient with fall on 4/13/23 with severe hip pain found to have fracture s/p IMN with Dr. Isidro on 4/14/23. Patient is " WBAT.  -PT and OT for mobility and ADLs. Per guidelines, 15 hours per week between PT, OT and/or SLP.  -Follow-up Dr. Isidro.      Anemia - Check AM CBC - 7.0. Severe worsening anemia puts patient at high risk for further decline including respiratory failure and shock. Consult hospitalist  -Recheck CBC, Fe panel, FOBT. Low Fe, ongoing severe anemia. Repeat 4/21 - 7.5. Will recheck on Fe supplement.     Leukocytosis - Check AM CBC. Most likely reactionary - improved     Azotemia - Check AM CMP - resolved     Hypocalcemia - Check AM CMP - 9.1, improved.      Hypophosphatemia - Check AM Mag/Phosph - improved. Not on supplements     Rhabdomyolysis - Check AM CPK - 728 trending down.     Pain - APAP/Oxycodone PRN and Celebrex BID. Discontinue scheduled Celebrex, PRN now available.     Muscle spasms - Patient having severe muscle spasms putting her at risk for increased falls and injury due to hip muscles buckling and given her recent hip fracture she has less ability to protect herself. Will start Tizanidine. Tizanidine is a high risk medication which must have LFTs monitored regularly. Will check AM CMP - Cr and LFTs stable. Continue Tizanidine.     Skin - Patient at risk for skin breakdown due to debility in areas including sacrum, achilles, elbows and head in addition to other sites. Nursing to assess skin daily.      GI Ppx - Patient on Prilosec for GERD prophylaxis. Patient on Senna-docusate for constipation prophylaxis.      DVT Ppx - Patient Lovenox on transfer.  ____________________________________    T. Rolando Burton MD/PhD  [facility] - Physical Medicine & Rehabilitation   [facility] - Brain Injury Medicine   ____________________________________

## 2023-04-24 NOTE — THERAPY
Recreational Therapy  Daily Treatment     Patient Name: Lorena oN  AGE:  73 y.o., SEX:  female  Medical Record #: [id number]  Today's Date: 4/24/2023       Subjective    Patient was ready and agreeable to recreation therapy session.      Objective       04/24/23 1301   Procedural Tracking   Procedural Tracking Community Re-Integration;Community Skills Development;Leisure Skills Awareness;Leisure Skills Development;Social Skills Training;Cognitive Skills Training;Gross Motor Functional Leisure Skills;Fine Motor Functional Leisure Skills;Group Treatment   Treatment Time   Total Time Spent (mins) 30   Total Time Missed 0   Functional Ability Status - Cognitive   Attention Span Remains on Task   Comprehension Follows One Step Commands   Judgment Able to Make Independent Decisions   Functional Ability Status - Emotional    Affect Appropriate   Mood Appropriate   Behavior Appropriate;Cooperative   Skilled Intervention    Skilled Intervention Gross Motor Leisure;Fine Motor Leisure;Cognitive Leisure   Interdisciplinary Plan of Care Collaboration   Patient Position at End of Therapy Seated;Phone within Reach;Tray Table within Reach;Call Light within Reach   Strengths & Barriers   Strengths Able to follow instructions;Alert and oriented;Willingly participates in therapeutic activities;Pleasant and cooperative;Making steady progress towards goals   Barriers Fatigue;Generalized weakness;Impaired activity tolerance;Limited mobility         Assessment    Patient participated in new game, [employer], during recreation therapy session. Patient required min cues for when to draw/discard a card. Patient was able to make sets & runs mod I, and needed min cues to place completed phase onto table. Patient was mod I for fine motor aspects of activity. Patient benefited from the use of a card vergara.     Strengths: (P) Able to follow instructions, Alert and oriented, Willingly participates in therapeutic activities, Pleasant and  cooperative, Making steady progress towards goals  Barriers: (P) Fatigue, Generalized weakness, Impaired activity tolerance, Limited mobility    Plan    Patient will benefit from continued recreation therapy sessions.     Passport items to be completed:  Verbalize two positive leisure activities, discuss returning to work, hobbies, community groups or volunteer activities, explore community resources

## 2023-04-24 NOTE — CARE PLAN
The patient is Watcher - Medium risk of patient condition declining or worsening    Shift Goals  Clinical Goals: Pain management  Patient Goals: Sleep, pain management  Problem: Fall Risk - Rehab  Goal: Patient will remain free from falls  Outcome: Progressing

## 2023-04-24 NOTE — THERAPY
Occupational Therapy  Daily Treatment     Patient Name: Lorena No  Age:  73 y.o., Sex:  female  Medical Record #: [id number]  Today's Date: 4/24/2023     Precautions  Precautions: (P) Fall Risk, Weight Bearing As Tolerated Left Lower Extremity  Comments: LLE antalgia         Subjective    Patient was seated in w/c with legs up on the bed upon OT arrival.  She was pleasant and agreeable to OT.  She requested to walk to the gym with FWW.     Objective       04/24/23 1031   OT Charge Group   OT Therapy Activity (Units) 1   OT Therapeutic Exercise (Units) 3   OT Total Time Spent   OT Individual Total Time Spent (Mins) 60   Precautions   Precautions Fall Risk;Weight Bearing As Tolerated Left Lower Extremity   Pain   Intervention Cold Pack;Nurse Notified  (pain meds given by RN, ice x 20 minutes)   Pain 0 - 10 Group   Location Hip   Location Orientation Left   Sitting Upper Body Exercises   Lat Pull 3 sets of 10;Bilateral;Weight (See Comments for lbs)  (30 lbs on equalizer)   Tricep Press 3 sets of 10;Bilateral;Weight (See Comments for lbs)  (25, 35, 35 lbs on rickshaw forward)   Sitting Lower Body Exercises   Nustep Time (See Comments)  (level 4 x 10 minutes with B UE/LE)   Interdisciplinary Plan of Care Collaboration   IDT Collaboration with  Nursing   Patient Position at End of Therapy Seated;Call Light within Reach;Tray Table within Reach;Phone within Reach   Collaboration Comments OT requested pain meds on patient's behalf     Functional mobility with FWW and CGA from room to main gym.    Assessment    Patient ambulates with antalgic gait with FWW due to hip pain. Tolerated all activities well.  Pain increased with mobility and was agreeable to OT getting ice for her hip and request pain medicine from the nurse.  Strengths: Able to follow instructions, Alert and oriented, Good insight into deficits/needs, Independent prior level of function, Making steady progress towards goals, Manages pain appropriately,  Motivated for self care and independence, Pleasant and cooperative, Supportive family, Willingly participates in therapeutic activities    Plan    ADLs, transfers, functional mobility with FWW, standing tolerance/balance with accepting more weight through L LE    Occupational Therapy Goals (Active)       Problem: Bathing       Dates: Start: 04/18/23         Goal: STG-Within one week, patient will bathe with CGA overall with AE/DME as needed.       Dates: Start: 04/18/23               Problem: Dressing       Dates: Start: 04/18/23         Goal: STG-Within one week, patient will dress LB with CGA overall with AE/DME as needed.       Dates: Start: 04/18/23               Problem: Functional Transfers       Dates: Start: 04/18/23         Goal: STG-Within one week, patient will transfer to toilet with SBA overall with AE/DME as needed.       Dates: Start: 04/18/23               Problem: IADL's       Dates: Start: 04/18/23         Goal: STG-Within one week, patient will access kitchen area with SBA overall with AE/DME as needed.       Dates: Start: 04/18/23               Problem: OT Long Term Goals       Dates: Start: 04/18/23         Goal: LTG-By discharge, patient will complete basic self care tasks with mod I overall using AE/DME as needed.       Dates: Start: 04/18/23            Goal: LTG-By discharge, patient will perform bathroom transfers with mod I overall with AE/DME as needed.       Dates: Start: 04/18/23            Goal: LTG-By discharge, patient will complete basic home management with supervision-mod I overall using AE/DME as needed.       Dates: Start: 04/18/23               Problem: Toileting       Dates: Start: 04/18/23         Goal: STG-Within one week, patient will complete toileting tasks with CGA overall with AE/DME as needed.       Dates: Start: 04/18/23

## 2023-04-24 NOTE — CARE PLAN
The patient is Watcher - Medium risk of patient condition declining or worsening    Shift Goals  Clinical Goals: pain and bowel mangement    Problem: Pain - Standard  Goal: Alleviation of pain or a reduction in pain to the patient’s comfort goal  Note: Patient is having left hip pain, PRN oxy and zanaflex given, will continue to monitor.      Problem: Bowel Elimination  Goal: Patient will participate in bowel management program  Note: LBM 4/21, PRN milk of mag given along with prune juice, no results yet, will continue to monitor

## 2023-04-25 ENCOUNTER — APPOINTMENT (OUTPATIENT)
Dept: OCCUPATIONAL THERAPY | Facility: REHABILITATION | Age: 73
DRG: 560 | End: 2023-04-25
Attending: PHYSICAL MEDICINE & REHABILITATION
Payer: MEDICARE

## 2023-04-25 ENCOUNTER — APPOINTMENT (OUTPATIENT)
Dept: PHYSICAL THERAPY | Facility: REHABILITATION | Age: 73
DRG: 560 | End: 2023-04-25
Attending: PHYSICAL MEDICINE & REHABILITATION
Payer: MEDICARE

## 2023-04-25 LAB
ANION GAP SERPL CALC-SCNC: 10 MMOL/L (ref 7–16)
BASOPHILS # BLD AUTO: 1 % (ref 0–1.8)
BASOPHILS # BLD: 0.09 K/UL (ref 0–0.12)
BUN SERPL-MCNC: 12 MG/DL (ref 8–22)
CALCIUM SERPL-MCNC: 8.9 MG/DL (ref 8.5–10.5)
CHLORIDE SERPL-SCNC: 105 MMOL/L (ref 96–112)
CO2 SERPL-SCNC: 24 MMOL/L (ref 20–33)
CREAT SERPL-MCNC: 0.54 MG/DL (ref 0.5–1.4)
EOSINOPHIL # BLD AUTO: 0.14 K/UL (ref 0–0.51)
EOSINOPHIL NFR BLD: 1.5 % (ref 0–6.9)
ERYTHROCYTE [DISTWIDTH] IN BLOOD BY AUTOMATED COUNT: 54.4 FL (ref 35.9–50)
GFR SERPLBLD CREATININE-BSD FMLA CKD-EPI: 98 ML/MIN/1.73 M 2
GLUCOSE SERPL-MCNC: 97 MG/DL (ref 65–99)
HCT VFR BLD AUTO: 25.6 % (ref 37–47)
HGB BLD-MCNC: 8.4 G/DL (ref 12–16)
IMM GRANULOCYTES # BLD AUTO: 0.06 K/UL (ref 0–0.11)
IMM GRANULOCYTES NFR BLD AUTO: 0.7 % (ref 0–0.9)
LYMPHOCYTES # BLD AUTO: 1.72 K/UL (ref 1–4.8)
LYMPHOCYTES NFR BLD: 18.7 % (ref 22–41)
MAGNESIUM SERPL-MCNC: 2 MG/DL (ref 1.5–2.5)
MCH RBC QN AUTO: 31.5 PG (ref 27–33)
MCHC RBC AUTO-ENTMCNC: 32.8 G/DL (ref 33.6–35)
MCV RBC AUTO: 95.9 FL (ref 81.4–97.8)
MONOCYTES # BLD AUTO: 1.06 K/UL (ref 0–0.85)
MONOCYTES NFR BLD AUTO: 11.5 % (ref 0–13.4)
NEUTROPHILS # BLD AUTO: 6.11 K/UL (ref 2–7.15)
NEUTROPHILS NFR BLD: 66.6 % (ref 44–72)
NRBC # BLD AUTO: 0 K/UL
NRBC BLD-RTO: 0 /100 WBC
PLATELET # BLD AUTO: 516 K/UL (ref 164–446)
PMV BLD AUTO: 9.7 FL (ref 9–12.9)
POTASSIUM SERPL-SCNC: 3.9 MMOL/L (ref 3.6–5.5)
RBC # BLD AUTO: 2.67 M/UL (ref 4.2–5.4)
SODIUM SERPL-SCNC: 139 MMOL/L (ref 135–145)
WBC # BLD AUTO: 9.2 K/UL (ref 4.8–10.8)

## 2023-04-25 PROCEDURE — 97535 SELF CARE MNGMENT TRAINING: CPT

## 2023-04-25 PROCEDURE — 97530 THERAPEUTIC ACTIVITIES: CPT

## 2023-04-25 PROCEDURE — 99232 SBSQ HOSP IP/OBS MODERATE 35: CPT | Performed by: PHYSICAL MEDICINE & REHABILITATION

## 2023-04-25 PROCEDURE — 36415 COLL VENOUS BLD VENIPUNCTURE: CPT

## 2023-04-25 PROCEDURE — A9270 NON-COVERED ITEM OR SERVICE: HCPCS | Performed by: PHYSICAL MEDICINE & REHABILITATION

## 2023-04-25 PROCEDURE — 85025 COMPLETE CBC W/AUTO DIFF WBC: CPT

## 2023-04-25 PROCEDURE — 700111 HCHG RX REV CODE 636 W/ 250 OVERRIDE (IP): Performed by: PHYSICAL MEDICINE & REHABILITATION

## 2023-04-25 PROCEDURE — 80048 BASIC METABOLIC PNL TOTAL CA: CPT

## 2023-04-25 PROCEDURE — 700102 HCHG RX REV CODE 250 W/ 637 OVERRIDE(OP): Performed by: HOSPITALIST

## 2023-04-25 PROCEDURE — 97116 GAIT TRAINING THERAPY: CPT

## 2023-04-25 PROCEDURE — 97110 THERAPEUTIC EXERCISES: CPT

## 2023-04-25 PROCEDURE — 83735 ASSAY OF MAGNESIUM: CPT

## 2023-04-25 PROCEDURE — 700102 HCHG RX REV CODE 250 W/ 637 OVERRIDE(OP): Performed by: PHYSICAL MEDICINE & REHABILITATION

## 2023-04-25 PROCEDURE — A9270 NON-COVERED ITEM OR SERVICE: HCPCS | Performed by: HOSPITALIST

## 2023-04-25 PROCEDURE — 770010 HCHG ROOM/CARE - REHAB SEMI PRIVAT*

## 2023-04-25 PROCEDURE — 700101 HCHG RX REV CODE 250: Performed by: PHYSICAL MEDICINE & REHABILITATION

## 2023-04-25 RX ADMIN — OMEPRAZOLE 20 MG: 20 CAPSULE, DELAYED RELEASE ORAL at 08:48

## 2023-04-25 RX ADMIN — ENOXAPARIN SODIUM 40 MG: 40 INJECTION SUBCUTANEOUS at 17:31

## 2023-04-25 RX ADMIN — OXYCODONE HYDROCHLORIDE 5 MG: 5 TABLET ORAL at 02:53

## 2023-04-25 RX ADMIN — OXYCODONE HYDROCHLORIDE 10 MG: 10 TABLET ORAL at 16:20

## 2023-04-25 RX ADMIN — LIDOCAINE 1 PATCH: 50 PATCH TOPICAL at 20:43

## 2023-04-25 RX ADMIN — SENNOSIDES AND DOCUSATE SODIUM 2 TABLET: 50; 8.6 TABLET ORAL at 20:43

## 2023-04-25 RX ADMIN — FERROUS SULFATE TAB 325 MG (65 MG ELEMENTAL FE) 325 MG: 325 (65 FE) TAB at 08:48

## 2023-04-25 RX ADMIN — FERROUS SULFATE TAB 325 MG (65 MG ELEMENTAL FE) 325 MG: 325 (65 FE) TAB at 17:31

## 2023-04-25 RX ADMIN — SENNOSIDES AND DOCUSATE SODIUM 2 TABLET: 50; 8.6 TABLET ORAL at 08:49

## 2023-04-25 RX ADMIN — TRAZODONE HYDROCHLORIDE 50 MG: 50 TABLET ORAL at 20:48

## 2023-04-25 RX ADMIN — OXYCODONE HYDROCHLORIDE 10 MG: 10 TABLET ORAL at 08:49

## 2023-04-25 ASSESSMENT — PAIN DESCRIPTION - PAIN TYPE
TYPE: ACUTE PAIN

## 2023-04-25 ASSESSMENT — GAIT ASSESSMENTS
ASSISTIVE DEVICE: FRONT WHEEL WALKER
GAIT LEVEL OF ASSIST: STANDBY ASSIST
DEVIATION: ANTALGIC;STEP TO
DISTANCE (FEET): 120

## 2023-04-25 ASSESSMENT — ACTIVITIES OF DAILY LIVING (ADL)
BED_CHAIR_WHEELCHAIR_TRANSFER_DESCRIPTION: ADAPTIVE EQUIPMENT;INCREASED TIME;SET-UP OF EQUIPMENT;SUPERVISION FOR SAFETY;VERBAL CUEING

## 2023-04-25 NOTE — THERAPY
"Physical Therapy   Daily Treatment     Patient Name: Lorena No  Age:  73 y.o., Sex:  female  Medical Record #: [id number]  Today's Date: 4/25/2023     Precautions  Precautions: Fall Risk, Weight Bearing As Tolerated Left Lower Extremity  Comments: LLE antalgia    Subjective    Pt seated in w/c upon arrival, agreeable to session.     Objective       04/25/23 0831   PT Charge Group   PT Gait Training (Units) 1   PT Therapeutic Exercise (Units) 1   PT Therapeutic Activities (Units) 2   Supervising Physical Therapist Christos Antoine   PT Total Time Spent   PT Individual Total Time Spent (Mins) 60   Pain 0 - 10 Group   Location Leg   Location Orientation Left   Pain Rating Scale (NPRS) 6   Therapist Pain Assessment Prior to Activity;Nurse Notified   Cognition    Level of Consciousness Alert   Gait Functional Level of Assist    Gait Level Of Assist Standby Assist   Assistive Device Front Wheel Walker   Distance (Feet) 120   # of Times Distance was Traveled 2   Deviation Antalgic;Step To   Stairs Functional Level of Assist   Level of Assist with Stairs Contact Guard Assist   # of Stairs Climbed 8  (6\" 4x2 w/ BHRs)   Stairs Description Extra time;Hand rails;Limited by fatigue;Supervision for safety;Verbal cueing  (step-to)   Transfer Functional Level of Assist   Bed, Chair, Wheelchair Transfer Standby Assist   Bed Chair Wheelchair Transfer Description Adaptive equipment;Increased time;Set-up of equipment;Supervision for safety;Verbal cueing  (FWW)   Sitting Lower Body Exercises   Sitting Lower Body Exercises   (BUE holding #4)   Bicep Curls 1 set of 15;Bilateral   Bilateral Row 1 set of 15;Bilateral   Other Exercises chest press 1x20, D1/D2 PNF pattern 1x20 each side   Bed Mobility    Supine to Sit Standby Assist  (w/ leg )   Sit to Supine Standby Assist  (w/ leg )   Sit to Stand Standby Assist   Scooting Supervised   Interdisciplinary Plan of Care Collaboration   IDT Collaboration with  Nursing   Patient " Position at End of Therapy In Bed;Call Light within Reach;Tray Table within Reach;Phone within Reach   Collaboration Comments re:pain     Bed mob assessed in ADL bedroom on standard bed w/o bed rail.  Introduced leg  to increase independency during bed mobility.    Assessment    Pt tolerated session well, progressing in ambulation distance w/ FWW today despite pain. Getting in the bed leading w/ RLE is easier since LLE abduction is limited by pain and weakness.    Strengths: Able to follow instructions, Alert and oriented, Effective communication skills, Good insight into deficits/needs, Independent prior level of function, Making steady progress towards goals, Motivated for self care and independence, Pleasant and cooperative, Willingly participates in therapeutic activities  Barriers: Decreased endurance, Generalized weakness, Home accessibility, Impaired balance, Limited mobility, Pain, Poor family support    Plan    Review supine and standing TE for LLE strength/AG ROM  LLE weight acceptance, FWW ambulation progression outdoors (trial 4WW?), LLE strengthening, initiate car transfers, stairs, standing balance with decreased UE support     Passport items to be completed:  Get in/out of bed safely, in/out of a vehicle, safely use mobility device, walk or wheel around home/community, navigate up and down stairs, show how to get up/down from the ground, ensure home is accessible, demonstrate HEP, complete caregiver training    Physical Therapy Problems (Active)       Problem: Balance       Dates: Start: 04/20/23         Goal: STG-Within one week, patient will perform standing home exercise program with handout at SPV level.       Dates: Start: 04/20/23               Problem: Mobility       Dates: Start: 04/18/23         Goal: STG-Within one week, patient will ambulate 2x 50 feet with FWW at SBA level.       Dates: Start: 04/18/23         Goal Note filed on 04/20/23 0846 by Gomez Antoine, PT       PT  evaluation completed two days ago on 4/18/23.  Will monitor progress towards STGs.                 Problem: Mobility Transfers       Dates: Start: 04/18/23         Goal: STG-Within one week, patient will perform bed mobility at SBA level including BLE management in/out of bed.       Dates: Start: 04/18/23         Goal Note filed on 04/20/23 0846 by Gomez Antoine, PT       PT evaluation completed two days ago on 4/18/23.  Will monitor progress towards STGs.              Goal: STG-Within one week, patient will sit to stand at SBA level with FWW.       Dates: Start: 04/18/23         Goal Note filed on 04/20/23 0846 by Gomez Antoine, PT       PT evaluation completed two days ago on 4/18/23.  Will monitor progress towards STGs.              Goal: STG-Within one week, patient will transfer bed to chair at SBA level with FWW       Dates: Start: 04/18/23         Goal Note filed on 04/20/23 0846 by Gomez Antoine, PT       PT evaluation completed two days ago on 4/18/23.  Will monitor progress towards STGs.                 Problem: PT-Long Term Goals       Dates: Start: 04/18/23         Goal: LTG-By discharge, patient will ambulate 150 feet with FWW at IND level indoors & outdoors.       Dates: Start: 04/18/23            Goal: LTG-By discharge, patient will transfer one surface to another with FWW at IND level.       Dates: Start: 04/18/23            Goal: LTG-By discharge, patient will perform home exercise program with handout at IND level.       Dates: Start: 04/18/23            Goal: LTG-By discharge, patient will ambulate up/down 4 stairs with B railings at IND level.       Dates: Start: 04/18/23            Goal: LTG-By discharge, patient will transfer in/out of a car with FWW at IND level.       Dates: Start: 04/18/23

## 2023-04-25 NOTE — THERAPY
"Physical Therapy   Daily Treatment     Patient Name: Lorena No  Age:  73 y.o., Sex:  female  Medical Record #: [id number]  Today's Date: 4/25/2023     Precautions  Precautions: Fall Risk, Weight Bearing As Tolerated Left Lower Extremity  Comments: LLE antalgia    Subjective    Patient received and agreeable to PT.     Objective       04/25/23 1031   PT Charge Group   PT Gait Training (Units) 1   PT Therapeutic Exercise (Units) 2   PT Therapeutic Activities (Units) 1   PT Total Time Spent   PT Individual Total Time Spent (Mins) 60   Vitals   O2 (LPM) 0   O2 Delivery Device None - Room Air   Pain   Intervention Education   Standing Lower Body Exercises   Standing Lower Body Exercises   (compensated use of BUE to decrease LLE WB during standing HEP performance today; CGA)   Hamstring Curl 1 set of 10   Hip Abduction 1 set of 10   Marching 1 set of 10   Heel Rise 1 set of 10   Toe Rise 1 set of 10   Comments Ascending/descending a 6\" curb step with a FWW at Min A to CGA level; cueing for new task learning.  CGA with FWW use for 2x10 ft uneven surface terrain.  SBA-CGA with car transfer using FWW, increased time for LE management out of vehicle.   Bed Mobility    Sit to Stand Standby Assist   Scooting Supervised   Neuro-Muscular Treatments   Comments 2 standing reps with minimal UE support for 5 min and 7 min at tabletop while using 1-2 UE for Jenga activity with cognitive component; focus on increased weightbearing on LLE without excesive weight shifts; SBA.   Interdisciplinary Plan of Care Collaboration   IDT Collaboration with  Physical Therapist Assistant (PTA)   Patient Position at End of Therapy Seated;Chair Alarm On;Self Releasing Lap Belt Applied;Tray Table within Reach;Call Light within Reach;Phone within Reach   Collaboration Comments progress     Discussed POC, DME at d/c, reviewed five LTGs with patient, pain management, need for increased use of LLE, and repetition with education.    Assessment    Patient " has impaired LLE weight acceptance; variable carryover with education; mild kinesiophobia due to antalgia or anxiety/fear of antalgia; improving activity tolerance and BLE strength otherwise.    Strengths: Able to follow instructions, Alert and oriented, Effective communication skills, Good insight into deficits/needs, Independent prior level of function, Making steady progress towards goals, Motivated for self care and independence, Pleasant and cooperative, Willingly participates in therapeutic activities  Barriers: Decreased endurance, Generalized weakness, Home accessibility, Impaired balance, Limited mobility, Pain, Poor family support    Plan    Review supine and standing HEP for LLE strength/AG ROM, LLE weight acceptance, FWW ambulation progression outdoors (trial 4WW?), bed mobility with decreased leg  use (as able), standard stairs    D/C scheduled for Saturday 4/29/23.    Passport items to be completed:  Get in/out of bed safely, in/out of a vehicle, safely use mobility device, walk or wheel around home/community, navigate up and down stairs, show how to get up/down from the ground, ensure home is accessible, demonstrate HEP, complete caregiver training    Physical Therapy Problems (Active)       Problem: Balance       Dates: Start: 04/20/23         Goal: STG-Within one week, patient will perform standing home exercise program with handout at SPV level.       Dates: Start: 04/20/23               Problem: Mobility       Dates: Start: 04/18/23         Goal: STG-Within one week, patient will ambulate 2x 50 feet with FWW at SBA level.       Dates: Start: 04/18/23         Goal Note filed on 04/20/23 0846 by Gomez Antoine, PT       PT evaluation completed two days ago on 4/18/23.  Will monitor progress towards STGs.                 Problem: Mobility Transfers       Dates: Start: 04/18/23         Goal: STG-Within one week, patient will perform bed mobility at SBA level including BLE management  in/out of bed.       Dates: Start: 04/18/23         Goal Note filed on 04/20/23 0846 by Gomez Antoine, PT       PT evaluation completed two days ago on 4/18/23.  Will monitor progress towards STGs.              Goal: STG-Within one week, patient will sit to stand at SBA level with FWW.       Dates: Start: 04/18/23         Goal Note filed on 04/20/23 0846 by Gomez Antoine, PT       PT evaluation completed two days ago on 4/18/23.  Will monitor progress towards STGs.              Goal: STG-Within one week, patient will transfer bed to chair at SBA level with FWW       Dates: Start: 04/18/23         Goal Note filed on 04/20/23 0846 by Gomez Antoine, PT       PT evaluation completed two days ago on 4/18/23.  Will monitor progress towards STGs.                 Problem: PT-Long Term Goals       Dates: Start: 04/18/23         Goal: LTG-By discharge, patient will ambulate 150 feet with FWW at IND level indoors & outdoors.       Dates: Start: 04/18/23            Goal: LTG-By discharge, patient will transfer one surface to another with FWW at IND level.       Dates: Start: 04/18/23            Goal: LTG-By discharge, patient will perform home exercise program with handout at IND level.       Dates: Start: 04/18/23            Goal: LTG-By discharge, patient will ambulate up/down 4 stairs with B railings at IND level.       Dates: Start: 04/18/23            Goal: LTG-By discharge, patient will transfer in/out of a car with FWW at IND level.       Dates: Start: 04/18/23

## 2023-04-25 NOTE — THERAPY
Occupational Therapy  Daily Treatment     Patient Name: Lorena No  Age:  73 y.o., Sex:  female  Medical Record #: [id number]  Today's Date: 4/25/2023     Precautions  Precautions: (P) Fall Risk, Weight Bearing As Tolerated Left Lower Extremity  Comments: (P) LLE antalgia         Subjective    Pt seated in wc upon arrival, agreeable to OT session.      Objective       04/25/23 1231   OT Charge Group   OT Self Care / ADL (Units) 3   OT Therapy Activity (Units) 1   OT Total Time Spent   OT Individual Total Time Spent (Mins) 60   Precautions   Precautions Fall Risk;Weight Bearing As Tolerated Left Lower Extremity   Comments LLE antalgia   Functional Level of Assist   Eating   (Set-up)   Grooming Modified Independent;Seated   Bathing Standby Assist   Bathing Description Grab bar;Hand held shower;Long handled bath tool;Tub bench;Increased time;Initial preparation for task;Set up for wound protection  (Incorporating sitting and standing to wash rear)   Upper Body Dressing Independent   Lower Body Dressing Minimal Assist  (req A for doffing pants on LLE)   Toileting Standby Assist   Toilet Transfers Standby Assist   Tub / Shower Transfers Contact Guard Assist   Bed Mobility    Sit to Supine Standby Assist  (without leg , utilizing leg ooking technique)   Interdisciplinary Plan of Care Collaboration   IDT Collaboration with  Nursing   Patient Position at End of Therapy In Bed;Bed Alarm On;Call Light within Reach;Tray Table within Reach;Phone within Reach   Collaboration Comments re bandage change       Assessment    Pt cont to have decreased WB through LLE and benefits from use of FWW for balance when standing to complete pants over hips. Pt utilized sock-aid this date and would benefit from cont practice with jr-aid and reacher as needed for LB dressing and working with pt to obtain this equipment for use at home.    Strengths: Able to follow instructions, Alert and oriented, Good insight into deficits/needs,  Independent prior level of function, Making steady progress towards goals, Manages pain appropriately, Motivated for self care and independence, Pleasant and cooperative, Supportive family, Willingly participates in therapeutic activities    Plan    ADLs, transfers, functional mobility with FWW, standing tolerance/balance with accepting more weight through L LE  Overhead and below reaching tasks    Occupational Therapy Goals (Active)       Problem: Bathing       Dates: Start: 04/18/23         Goal: STG-Within one week, patient will bathe with CGA overall with AE/DME as needed.       Dates: Start: 04/18/23               Problem: Dressing       Dates: Start: 04/18/23         Goal: STG-Within one week, patient will dress LB with CGA overall with AE/DME as needed.       Dates: Start: 04/18/23               Problem: Functional Transfers       Dates: Start: 04/18/23         Goal: STG-Within one week, patient will transfer to toilet with SBA overall with AE/DME as needed.       Dates: Start: 04/18/23               Problem: IADL's       Dates: Start: 04/18/23         Goal: STG-Within one week, patient will access kitchen area with SBA overall with AE/DME as needed.       Dates: Start: 04/18/23               Problem: OT Long Term Goals       Dates: Start: 04/18/23         Goal: LTG-By discharge, patient will complete basic self care tasks with mod I overall using AE/DME as needed.       Dates: Start: 04/18/23            Goal: LTG-By discharge, patient will perform bathroom transfers with mod I overall with AE/DME as needed.       Dates: Start: 04/18/23            Goal: LTG-By discharge, patient will complete basic home management with supervision-mod I overall using AE/DME as needed.       Dates: Start: 04/18/23               Problem: Toileting       Dates: Start: 04/18/23         Goal: STG-Within one week, patient will complete toileting tasks with CGA overall with AE/DME as needed.       Dates: Start: 04/18/23

## 2023-04-25 NOTE — CARE PLAN
"  Problem: Fall Risk - Rehab  Goal: Patient will remain free from falls  Outcome: Progressing  Note: Michelle Amador Fall risk Assessment Score: 11    Moderate fall risk Interventions  - Bed and strip alarm   - Yellow sign by the door   - Yellow wrist band \"Fall risk\"  - Room near to the nurse station  - Do not leave patient unattended in the bathroom  - Fall risk education provided    Problem: Pain - Standard  Goal: Alleviation of pain or a reduction in pain to the patient’s comfort goal  Outcome: Progressing  Note: Assessed for pain and discomfort, medicated for left hip pain with oxycodone 5 mg. [ See mar] Cont monitored.   The patient is Stable - Low risk of patient condition declining or worsening    Shift Goals  Clinical Goals: pain and bowel mangement  Patient Goals: Sleep, pain management    Progress made toward(s) clinical / shift goals:  Pt free from fall and injury.    "

## 2023-04-25 NOTE — CARE PLAN
"  Problem: Fall Risk - Rehab  Goal: Patient will remain free from falls  Outcome: Progressing  Michelle Amador Fall risk Assessment Score: 11    Moderate fall risk Interventions  - Bed and strip alarm   - Yellow sign by the door   - Yellow wrist band \"Fall risk\"  - Room near to the nurse station  - Do not leave patient unattended in the bathroom  - Fall risk education provided          Problem: Pain - Standard  Goal: Alleviation of pain or a reduction in pain to the patient’s comfort goal  Outcome: Progressing   Patient is able to rate pain on a scale of 1-10.   "

## 2023-04-26 ENCOUNTER — APPOINTMENT (OUTPATIENT)
Dept: PHYSICAL THERAPY | Facility: REHABILITATION | Age: 73
DRG: 560 | End: 2023-04-26
Attending: PHYSICAL MEDICINE & REHABILITATION
Payer: MEDICARE

## 2023-04-26 ENCOUNTER — APPOINTMENT (OUTPATIENT)
Dept: OCCUPATIONAL THERAPY | Facility: REHABILITATION | Age: 73
DRG: 560 | End: 2023-04-26
Attending: PHYSICAL MEDICINE & REHABILITATION
Payer: MEDICARE

## 2023-04-26 LAB — EKG IMPRESSION: NORMAL

## 2023-04-26 PROCEDURE — A9270 NON-COVERED ITEM OR SERVICE: HCPCS | Performed by: PHYSICAL MEDICINE & REHABILITATION

## 2023-04-26 PROCEDURE — 97110 THERAPEUTIC EXERCISES: CPT

## 2023-04-26 PROCEDURE — 97530 THERAPEUTIC ACTIVITIES: CPT

## 2023-04-26 PROCEDURE — 700102 HCHG RX REV CODE 250 W/ 637 OVERRIDE(OP): Performed by: PHYSICAL MEDICINE & REHABILITATION

## 2023-04-26 PROCEDURE — 93010 ELECTROCARDIOGRAM REPORT: CPT | Performed by: INTERNAL MEDICINE

## 2023-04-26 PROCEDURE — 700102 HCHG RX REV CODE 250 W/ 637 OVERRIDE(OP): Performed by: HOSPITALIST

## 2023-04-26 PROCEDURE — 97535 SELF CARE MNGMENT TRAINING: CPT

## 2023-04-26 PROCEDURE — A9270 NON-COVERED ITEM OR SERVICE: HCPCS | Performed by: HOSPITALIST

## 2023-04-26 PROCEDURE — 770010 HCHG ROOM/CARE - REHAB SEMI PRIVAT*

## 2023-04-26 PROCEDURE — 700111 HCHG RX REV CODE 636 W/ 250 OVERRIDE (IP): Performed by: PHYSICAL MEDICINE & REHABILITATION

## 2023-04-26 PROCEDURE — 700101 HCHG RX REV CODE 250: Performed by: PHYSICAL MEDICINE & REHABILITATION

## 2023-04-26 PROCEDURE — 93005 ELECTROCARDIOGRAM TRACING: CPT | Performed by: PHYSICAL MEDICINE & REHABILITATION

## 2023-04-26 PROCEDURE — 99233 SBSQ HOSP IP/OBS HIGH 50: CPT | Performed by: PHYSICAL MEDICINE & REHABILITATION

## 2023-04-26 RX ADMIN — LIDOCAINE 1 PATCH: 50 PATCH TOPICAL at 20:10

## 2023-04-26 RX ADMIN — OXYCODONE HYDROCHLORIDE 10 MG: 10 TABLET ORAL at 22:24

## 2023-04-26 RX ADMIN — SENNOSIDES AND DOCUSATE SODIUM 2 TABLET: 50; 8.6 TABLET ORAL at 20:11

## 2023-04-26 RX ADMIN — ENOXAPARIN SODIUM 40 MG: 40 INJECTION SUBCUTANEOUS at 17:36

## 2023-04-26 RX ADMIN — TRAZODONE HYDROCHLORIDE 50 MG: 50 TABLET ORAL at 20:15

## 2023-04-26 RX ADMIN — OMEPRAZOLE 20 MG: 20 CAPSULE, DELAYED RELEASE ORAL at 08:14

## 2023-04-26 RX ADMIN — SENNOSIDES AND DOCUSATE SODIUM 2 TABLET: 50; 8.6 TABLET ORAL at 08:14

## 2023-04-26 RX ADMIN — FERROUS SULFATE TAB 325 MG (65 MG ELEMENTAL FE) 325 MG: 325 (65 FE) TAB at 17:36

## 2023-04-26 RX ADMIN — OXYCODONE HYDROCHLORIDE 10 MG: 10 TABLET ORAL at 01:05

## 2023-04-26 RX ADMIN — OXYCODONE HYDROCHLORIDE 10 MG: 10 TABLET ORAL at 15:02

## 2023-04-26 RX ADMIN — OXYCODONE HYDROCHLORIDE 10 MG: 10 TABLET ORAL at 08:13

## 2023-04-26 RX ADMIN — FERROUS SULFATE TAB 325 MG (65 MG ELEMENTAL FE) 325 MG: 325 (65 FE) TAB at 08:14

## 2023-04-26 ASSESSMENT — PAIN DESCRIPTION - PAIN TYPE
TYPE: ACUTE PAIN
TYPE: ACUTE PAIN

## 2023-04-26 ASSESSMENT — GAIT ASSESSMENTS
ASSISTIVE DEVICE: FRONT WHEEL WALKER
GAIT LEVEL OF ASSIST: SUPERVISED
DEVIATION: ANTALGIC;BRADYKINETIC;DECREASED HEEL STRIKE

## 2023-04-26 ASSESSMENT — ACTIVITIES OF DAILY LIVING (ADL)
BED_CHAIR_WHEELCHAIR_TRANSFER_DESCRIPTION: INCREASED TIME;SET-UP OF EQUIPMENT;SUPERVISION FOR SAFETY
TOILETING_LEVEL_OF_ASSIST_DESCRIPTION: GRAB BAR;INCREASED TIME;SUPERVISION FOR SAFETY
TOILET_TRANSFER_DESCRIPTION: GRAB BAR;INCREASED TIME;SUPERVISION FOR SAFETY

## 2023-04-26 NOTE — CARE PLAN
The patient is Stable - Low risk of patient condition declining or worsening    Shift Goals  Clinical Goals: Pain management  Patient Goals: Pain management      Problem: Pain - Standard  Goal: Alleviation of pain or a reduction in pain to the patient’s comfort goal  Outcome: Progressing     Patient c/o 7/10 pain in the left hip.  Medication given per MAR.    Problem: Self Care  Goal: Patient will have the ability to perform ADLs independently or with assistance  Outcome: Progressing    Patient able to get around room with wheelchair.  Makes own bed and straightens up her room without difficulty.

## 2023-04-26 NOTE — PROGRESS NOTES
"  Physical Medicine & Rehabilitation Progress Note    Encounter Date: 4/26/2023    Chief Complaint: Decreased mobility, hip pain    Interval Events (Subjective):  Patient sitting up in room. She reports therapy is going well. She reports pain is controlled. Notified later of HR into 170s and she feels her HR. She reports previously went to ER for similar symptoms. No ER visits noted in CareEverywhere that we could review. Stat EKG, check AM labs, including magnesium and BNP.     _____________________________________  Interdisciplinary Team Conference   Most recent IDT on 4/20/2023     Discharge Date/Disposition:  4/29/23  _____________________________________      Objective:  VITAL SIGNS: /76   Pulse (!) 178   Temp 36.6 °C (97.8 °F) (Oral)   Resp 18   Ht 1.575 m (5' 2\")   Wt 57.1 kg (125 lb 14.4 oz)   SpO2 98%   BMI 23.03 kg/m²   Gen: NAD  Psych: Mood and affect appropriate  CV: tachycardia, 0 edema  Resp: CTAB, no upper airway sounds  Abd: NTND  Neuro: AOx4, following commands    Laboratory Values:  Recent Results (from the past 72 hour(s))   CBC WITH DIFFERENTIAL    Collection Time: 04/25/23  6:15 AM   Result Value Ref Range    WBC 9.2 4.8 - 10.8 K/uL    RBC 2.67 (L) 4.20 - 5.40 M/uL    Hemoglobin 8.4 (L) 12.0 - 16.0 g/dL    Hematocrit 25.6 (L) 37.0 - 47.0 %    MCV 95.9 81.4 - 97.8 fL    MCH 31.5 27.0 - 33.0 pg    MCHC 32.8 (L) 33.6 - 35.0 g/dL    RDW 54.4 (H) 35.9 - 50.0 fL    Platelet Count 516 (H) 164 - 446 K/uL    MPV 9.7 9.0 - 12.9 fL    Neutrophils-Polys 66.60 44.00 - 72.00 %    Lymphocytes 18.70 (L) 22.00 - 41.00 %    Monocytes 11.50 0.00 - 13.40 %    Eosinophils 1.50 0.00 - 6.90 %    Basophils 1.00 0.00 - 1.80 %    Immature Granulocytes 0.70 0.00 - 0.90 %    Nucleated RBC 0.00 /100 WBC    Neutrophils (Absolute) 6.11 2.00 - 7.15 K/uL    Lymphs (Absolute) 1.72 1.00 - 4.80 K/uL    Monos (Absolute) 1.06 (H) 0.00 - 0.85 K/uL    Eos (Absolute) 0.14 0.00 - 0.51 K/uL    Baso (Absolute) 0.09 0.00 - " 0.12 K/uL    Immature Granulocytes (abs) 0.06 0.00 - 0.11 K/uL    NRBC (Absolute) 0.00 K/uL   Basic Metabolic Panel    Collection Time: 04/25/23  6:15 AM   Result Value Ref Range    Sodium 139 135 - 145 mmol/L    Potassium 3.9 3.6 - 5.5 mmol/L    Chloride 105 96 - 112 mmol/L    Co2 24 20 - 33 mmol/L    Glucose 97 65 - 99 mg/dL    Bun 12 8 - 22 mg/dL    Creatinine 0.54 0.50 - 1.40 mg/dL    Calcium 8.9 8.5 - 10.5 mg/dL    Anion Gap 10.0 7.0 - 16.0   MAGNESIUM    Collection Time: 04/25/23  6:15 AM   Result Value Ref Range    Magnesium 2.0 1.5 - 2.5 mg/dL   ESTIMATED GFR    Collection Time: 04/25/23  6:15 AM   Result Value Ref Range    GFR (CKD-EPI) 98 >60 mL/min/1.73 m 2         Medications:  Scheduled Medications   Medication Dose Frequency    ferrous sulfate  325 mg BID WITH MEALS    Pharmacy Consult Request  1 Each PHARMACY TO DOSE    senna-docusate  2 Tablet BID    omeprazole  20 mg DAILY    enoxaparin (LOVENOX) injection  40 mg DAILY AT 1800    lidocaine  1 Patch Q24HR     PRN medications: tizanidine, Respiratory Therapy Consult, hydrALAZINE, senna-docusate **AND** polyethylene glycol/lytes **AND** magnesium hydroxide **AND** bisacodyl, mag hydrox-al hydrox-simeth, ondansetron **OR** ondansetron, traZODone, sodium chloride, oxyCODONE immediate-release **OR** oxyCODONE immediate-release, [COMPLETED] celecoxib **FOLLOWED BY** celecoxib, acetaminophen    Diet:  Current Diet Order   Procedures    Diet Order Diet: Level 6 - Soft and Bite Sized; Liquid level: Level 0 - Thin       Medical Decision Making and Plan:  Left hip fracture - Patient with fall on 4/13/23 with severe hip pain found to have fracture s/p IMN with Dr. Isidro on 4/14/23. Patient is WBAT.  -PT and OT for mobility and ADLs. Per guidelines, 15 hours per week between PT, OT and/or SLP.  -Follow-up Dr. Isidro.      Anemia - Check AM CBC - 7.0. Severe worsening anemia puts patient at high risk for further decline including respiratory failure and shock.  Consult hospitalist  -Recheck CBC, Fe panel, FOBT. Low Fe, ongoing severe anemia. Repeat 4/21 - 7.5. Will recheck on Fe supplement. Improved to 8.4     Tachycardia - New on 4/26/23, reportedly had an issue in the past but does not know diagnosis. Will check EKG, AM labs including BNP and Magnesium    Leukocytosis - Check AM CBC. Most likely reactionary - improved. Resolved     Azotemia - Check AM CMP - resolved     Hypocalcemia - Check AM CMP - 9.1, improved.      Hypophosphatemia - Check AM Mag/Phosph - improved. Not on supplements     Rhabdomyolysis - Check AM CPK - 728 trending down.     Pain - APAP/Oxycodone PRN and Celebrex BID. Discontinue scheduled Celebrex, PRN now available.     Muscle spasms - Patient having severe muscle spasms putting her at risk for increased falls and injury due to hip muscles buckling and given her recent hip fracture she has less ability to protect herself. Will start Tizanidine. Tizanidine is a high risk medication which must have LFTs monitored regularly. Will check AM CMP - Cr and LFTs stable. Continue Tizanidine.     Skin - Patient at risk for skin breakdown due to debility in areas including sacrum, achilles, elbows and head in addition to other sites. Nursing to assess skin daily.      GI Ppx - Patient on Prilosec for GERD prophylaxis. Patient on Senna-docusate for constipation prophylaxis.      DVT Ppx - Patient Lovenox on transfer.  ____________________________________    T. Rolando Burton MD/PhD  [facility] - Physical Medicine & Rehabilitation   [facility] - Brain Injury Medicine   ____________________________________    Total time:  50 minutes. Time spent included pre-rounding review of vitals and tests, unit/floor time, face-to-face time with the patient including physical examination, care coordination, counseling of patient and/or family, ordering medications/procedures/tests, discussion with CM, PT, OT, SLP and/or other healthcare providers, and documentation in the electronic  medical record. Topics discussed included new tachycardia, stat EKG, and AM labs.

## 2023-04-26 NOTE — THERAPY
Occupational Therapy  Daily Treatment     Patient Name: Lorena No  Age:  73 y.o., Sex:  female  Medical Record #: [id number]  Today's Date: 4/26/2023     Precautions  Precautions: (P) Fall Risk, Weight Bearing As Tolerated Left Lower Extremity  Comments: (P) LLE antalgia         Subjective    Pt reports having palpitations just prior to arrival and heart feels like it is racing. PT HR measured. Alerted RN and MD. MD ordering EKG though able to proceed with activities at this time, HR monitored throughout and reported to RN.     Objective       04/26/23 1301   OT Charge Group   OT Self Care / ADL (Units) 1   OT Therapy Activity (Units) 3   OT Total Time Spent   OT Individual Total Time Spent (Mins) 60   Precautions   Precautions Fall Risk;Weight Bearing As Tolerated Left Lower Extremity   Comments LLE antalgia   Vitals   Pulse (!) 184   Patient BP Position Sitting   Functional Level of Assist   Toileting Supervision   Toileting Description Grab bar;Increased time;Supervision for safety   Bed, Chair, Wheelchair Transfer Standby Assist   Toilet Transfers Standby Assist     Pt left in bed with all alarms on and RN aware of HR throghout    HR when laying down 168bpm, following progressive muscle relaxation (PRM)- 105 bpm, following toileting and ambulation in sitting 135    Pt completed guided PRM laying down in bed to encourage overall anxiety/stressor management. Pt able to participate to best of their ability and felt much more relaxed following brief session.     Assessment    Pt very limited this date by overall by heart rate to in bed activities with focus on relaxation techniques that can also be utilized at home upon dc. Pt able to follow through with guided PRM without difficulty, and requesting to utilize bathroom at end of session though willing to get back into bed following for overall HR management.    Strengths: Able to follow instructions, Alert and oriented, Good insight into deficits/needs,  Independent prior level of function, Making steady progress towards goals, Manages pain appropriately, Motivated for self care and independence, Pleasant and cooperative, Supportive family, Willingly participates in therapeutic activities    Plan    Participate in OT as able, monitor HR.    ADLs, transfers, functional mobility with FWW, standing tolerance/balance with accepting more weight through L LE  Overhead and below reaching tasks      Occupational Therapy Goals (Active)       Problem: Bathing       Dates: Start: 04/18/23         Goal: STG-Within one week, patient will bathe with CGA overall with AE/DME as needed.       Dates: Start: 04/18/23               Problem: Dressing       Dates: Start: 04/18/23         Goal: STG-Within one week, patient will dress LB with CGA overall with AE/DME as needed.       Dates: Start: 04/18/23               Problem: Functional Transfers       Dates: Start: 04/18/23         Goal: STG-Within one week, patient will transfer to toilet with SBA overall with AE/DME as needed.       Dates: Start: 04/18/23               Problem: IADL's       Dates: Start: 04/18/23         Goal: STG-Within one week, patient will access kitchen area with SBA overall with AE/DME as needed.       Dates: Start: 04/18/23               Problem: OT Long Term Goals       Dates: Start: 04/18/23         Goal: LTG-By discharge, patient will complete basic self care tasks with mod I overall using AE/DME as needed.       Dates: Start: 04/18/23            Goal: LTG-By discharge, patient will perform bathroom transfers with mod I overall with AE/DME as needed.       Dates: Start: 04/18/23            Goal: LTG-By discharge, patient will complete basic home management with supervision-mod I overall using AE/DME as needed.       Dates: Start: 04/18/23               Problem: Toileting       Dates: Start: 04/18/23         Goal: STG-Within one week, patient will complete toileting tasks with CGA overall with AE/DME as  needed.       Dates: Start: 04/18/23

## 2023-04-26 NOTE — PROGRESS NOTES
"  Physical Medicine & Rehabilitation Progress Note    Encounter Date: 4/25/2023    Chief Complaint: Decreased mobility, hip pain    Interval Events (Subjective):  Patient sitting up in room. She reports therapy is going well. She denies pain at rest. Denies NVD.     _____________________________________  Interdisciplinary Team Conference   Most recent IDT on 4/20/2023     Discharge Date/Disposition:  4/29/23  _____________________________________      Objective:  VITAL SIGNS: /64   Pulse 84   Temp 36.4 °C (97.6 °F) (Oral)   Resp 20   Ht 1.575 m (5' 2\")   Wt 57.1 kg (125 lb 14.4 oz)   SpO2 99%   BMI 23.03 kg/m²   Gen: NAD  Psych: Mood and affect appropriate  CV: RRR, 0 edema  Resp: CTAB, no upper airway sounds  Abd: NTND  Neuro: AOx4, following commands  Unchanged from 4/24/23    Laboratory Values:  Recent Results (from the past 72 hour(s))   CBC WITH DIFFERENTIAL    Collection Time: 04/25/23  6:15 AM   Result Value Ref Range    WBC 9.2 4.8 - 10.8 K/uL    RBC 2.67 (L) 4.20 - 5.40 M/uL    Hemoglobin 8.4 (L) 12.0 - 16.0 g/dL    Hematocrit 25.6 (L) 37.0 - 47.0 %    MCV 95.9 81.4 - 97.8 fL    MCH 31.5 27.0 - 33.0 pg    MCHC 32.8 (L) 33.6 - 35.0 g/dL    RDW 54.4 (H) 35.9 - 50.0 fL    Platelet Count 516 (H) 164 - 446 K/uL    MPV 9.7 9.0 - 12.9 fL    Neutrophils-Polys 66.60 44.00 - 72.00 %    Lymphocytes 18.70 (L) 22.00 - 41.00 %    Monocytes 11.50 0.00 - 13.40 %    Eosinophils 1.50 0.00 - 6.90 %    Basophils 1.00 0.00 - 1.80 %    Immature Granulocytes 0.70 0.00 - 0.90 %    Nucleated RBC 0.00 /100 WBC    Neutrophils (Absolute) 6.11 2.00 - 7.15 K/uL    Lymphs (Absolute) 1.72 1.00 - 4.80 K/uL    Monos (Absolute) 1.06 (H) 0.00 - 0.85 K/uL    Eos (Absolute) 0.14 0.00 - 0.51 K/uL    Baso (Absolute) 0.09 0.00 - 0.12 K/uL    Immature Granulocytes (abs) 0.06 0.00 - 0.11 K/uL    NRBC (Absolute) 0.00 K/uL   Basic Metabolic Panel    Collection Time: 04/25/23  6:15 AM   Result Value Ref Range    Sodium 139 135 - 145 mmol/L "    Potassium 3.9 3.6 - 5.5 mmol/L    Chloride 105 96 - 112 mmol/L    Co2 24 20 - 33 mmol/L    Glucose 97 65 - 99 mg/dL    Bun 12 8 - 22 mg/dL    Creatinine 0.54 0.50 - 1.40 mg/dL    Calcium 8.9 8.5 - 10.5 mg/dL    Anion Gap 10.0 7.0 - 16.0   MAGNESIUM    Collection Time: 04/25/23  6:15 AM   Result Value Ref Range    Magnesium 2.0 1.5 - 2.5 mg/dL   ESTIMATED GFR    Collection Time: 04/25/23  6:15 AM   Result Value Ref Range    GFR (CKD-EPI) 98 >60 mL/min/1.73 m 2         Medications:  Scheduled Medications   Medication Dose Frequency    ferrous sulfate  325 mg BID WITH MEALS    Pharmacy Consult Request  1 Each PHARMACY TO DOSE    senna-docusate  2 Tablet BID    omeprazole  20 mg DAILY    enoxaparin (LOVENOX) injection  40 mg DAILY AT 1800    lidocaine  1 Patch Q24HR     PRN medications: tizanidine, Respiratory Therapy Consult, hydrALAZINE, senna-docusate **AND** polyethylene glycol/lytes **AND** magnesium hydroxide **AND** bisacodyl, mag hydrox-al hydrox-simeth, ondansetron **OR** ondansetron, traZODone, sodium chloride, oxyCODONE immediate-release **OR** oxyCODONE immediate-release, [COMPLETED] celecoxib **FOLLOWED BY** celecoxib, acetaminophen    Diet:  Current Diet Order   Procedures    Diet Order Diet: Level 6 - Soft and Bite Sized; Liquid level: Level 0 - Thin       Medical Decision Making and Plan:  Left hip fracture - Patient with fall on 4/13/23 with severe hip pain found to have fracture s/p IMN with Dr. Isidro on 4/14/23. Patient is WBAT.  -PT and OT for mobility and ADLs. Per guidelines, 15 hours per week between PT, OT and/or SLP.  -Follow-up Dr. Isidro.      Anemia - Check AM CBC - 7.0. Severe worsening anemia puts patient at high risk for further decline including respiratory failure and shock. Consult hospitalist  -Recheck CBC, Fe panel, FOBT. Low Fe, ongoing severe anemia. Repeat 4/21 - 7.5. Will recheck on Fe supplement. Improved to 8.4     Leukocytosis - Check AM CBC. Most likely reactionary -  improved. Resolved     Azotemia - Check AM CMP - resolved     Hypocalcemia - Check AM CMP - 9.1, improved.      Hypophosphatemia - Check AM Mag/Phosph - improved. Not on supplements     Rhabdomyolysis - Check AM CPK - 728 trending down.     Pain - APAP/Oxycodone PRN and Celebrex BID. Discontinue scheduled Celebrex, PRN now available.     Muscle spasms - Patient having severe muscle spasms putting her at risk for increased falls and injury due to hip muscles buckling and given her recent hip fracture she has less ability to protect herself. Will start Tizanidine. Tizanidine is a high risk medication which must have LFTs monitored regularly. Will check AM CMP - Cr and LFTs stable. Continue Tizanidine.     Skin - Patient at risk for skin breakdown due to debility in areas including sacrum, achilles, elbows and head in addition to other sites. Nursing to assess skin daily.      GI Ppx - Patient on Prilosec for GERD prophylaxis. Patient on Senna-docusate for constipation prophylaxis.      DVT Ppx - Patient Lovenox on transfer.  ____________________________________    T. Rolando Burton MD/PhD  [facility] - Physical Medicine & Rehabilitation   [facility] - Brain Injury Medicine   ____________________________________

## 2023-04-26 NOTE — PROGRESS NOTES
Occupational therapist reported patient had palpitations and a HR of 184 when standing, 168 when seated.  Dr. Burton was notified and EKG was ordered.

## 2023-04-26 NOTE — CARE PLAN
"  Problem: Fall Risk - Rehab  Goal: Patient will remain free from falls  Outcome: Progressing  Note: Michelle English Fall risk Assessment Score: 11    High fall risk Interventions   - Alarming seatbelt  - Bed and strip alarm   - Yellow sign by the door   - Yellow wrist band \"Fall risk\"  - Room near to the nurse station  - Do not leave patient unattended in the bathroom  - Fall risk education provided       The patient is Stable - Low risk of patient condition declining or worsening    Shift Goals  Clinical Goals: Pain  Patient Goals: Rest    Progress made toward(s) clinical / shift goals:  Pt free from fall and injury.    "

## 2023-04-26 NOTE — THERAPY
Recreational Therapy  Daily Treatment     Patient Name: Lorena No  AGE:  73 y.o., SEX:  female  Medical Record #: [id number]  Today's Date: 4/26/2023       Subjective    Patient was ready and agreeable to recreation therapy session.      Objective       04/26/23 1031   Procedural Tracking   Procedural Tracking Community Re-Integration;Community Skills Development;Leisure Skills Awareness;Leisure Skills Development;Social Skills Training;Cognitive Skills Training;Gross Motor Functional Leisure Skills;Fine Motor Functional Leisure Skills;Group Treatment   Treatment Time   Total Time Spent (mins) 30   Total Time Missed 0   Functional Ability Status - Cognitive   Attention Span Remains on Task   Comprehension Follows One Step Commands;Follows Two Step Commands   Judgment Able to Make Independent Decisions   Functional Ability Status - Emotional    Affect Appropriate   Mood Appropriate   Behavior Appropriate;Cooperative   Skilled Intervention    Skilled Intervention Gross Motor Leisure;Fine Motor Leisure;Cognitive Leisure   Interdisciplinary Plan of Care Collaboration   IDT Collaboration with  Physician   Patient Position at End of Therapy Seated;Phone within Reach;Tray Table within Reach;Call Light within Reach   Strengths & Barriers   Strengths Able to follow instructions;Alert and oriented;Willingly participates in therapeutic activities;Pleasant and cooperative;Motivated for self care and independence;Making steady progress towards goals;Independent prior level of function;Good insight into deficits/needs   Barriers Decreased endurance;Fatigue;Generalized weakness;Impaired balance;Impaired activity tolerance;Limited mobility         Assessment    Patient and CTRS discussed dc information include ADA, accessible travel, modifications that need to be made to her home, and other relevant recreation therapy information. Patient expressed and understanding of all materials. Patient participated in new game, Nertz,  during recreation therapy session. Patient required min A with manipulating cards. Patient required mod cues for scanning, sequencing and recall. Patient benefited from verbal cues. Patient did well with new learning of mod complex activity.     Strengths: (P) Able to follow instructions, Alert and oriented, Willingly participates in therapeutic activities, Pleasant and cooperative, Motivated for self care and independence, Making steady progress towards goals, Independent prior level of function, Good insight into deficits/needs  Barriers: (P) Decreased endurance, Fatigue, Generalized weakness, Impaired balance, Impaired activity tolerance, Limited mobility    Plan    Patient will benefit from continued recreation therapy sessions.     Passport items to be completed:  Verbalize two positive leisure activities, discuss returning to work, hobbies, community groups or volunteer activities, explore community resources

## 2023-04-26 NOTE — THERAPY
Physical Therapy   Daily Treatment     Patient Name: Lorena No  Age:  73 y.o., Sex:  female  Medical Record #: [id number]  Today's Date: 4/26/2023     Precautions  Precautions: Fall Risk, Weight Bearing As Tolerated Left Lower Extremity  Comments: LLE antalgia    Subjective    Patient agreeable to PT; received in room.     Objective     04/26/23 0831   PT Charge Group   PT Therapeutic Exercise (Units) 3   PT Therapeutic Activities (Units) 1   PT Total Time Spent   PT Individual Total Time Spent (Mins) 60   Vitals   O2 (LPM) 0   O2 Delivery Device None - Room Air   Pain 0 - 10 Group   Location Leg   Location Orientation Left;Lateral   Description Aching   Comfort Goal 0   Therapist Pain Assessment 6  (also reports hematoma present)   Gait Functional Level of Assist    Gait Level Of Assist Supervised   Assistive Device Front Wheel Walker   Distance (Feet)   (16 ft EOB to toilet)   # of Times Distance was Traveled 2   Deviation Antalgic;Bradykinetic;Decreased Heel Strike  (cueing to increase WB onto LLE after first few steps; moderate WB through BUE continues to exist; discussed healing process and benefit of LLE WB, benefit of FWW over 4WW for current high level of BUE WB use onto FWW.)   Transfer Functional Level of Assist   Bed, Chair, Wheelchair Transfer Supervised   Bed Chair Wheelchair Transfer Description Increased time;Set-up of equipment;Supervision for safety  (FWW)   Toilet Transfers Supervised   Toilet Transfer Description Grab bar;Increased time;Supervision for safety  (FWW)   Sitting Lower Body Exercises   Sitting Lower Body Exercises   (5 lbs RLE, 2.5 lbs LLE, seated supported)   Ankle Pumps 2 sets of 10   Long Arc Quad 2 sets of 10   Marching 2 sets of 10   Nustep Resistance Level 2  (cueing to take 2 breaks, 15 min active total, 0.42 miles, avg 50 spm per pt preference, BLE only unless needed to initiate motion from a stop)   Bed Mobility    Supine to Sit Supervised  (no leg  or bed rail)   Sit  to Supine Standby Assist  (no leg  or bed rail)   Sit to Stand Supervised  (FWW, setup, increased time, decreased LLE weight acceptance earlier in session)   Scooting Modified Independent   Rolling Modified Independent   Interdisciplinary Plan of Care Collaboration   IDT Collaboration with  Occupational Therapist   Patient Position at End of Therapy Seated;Chair Alarm On;Self Releasing Lap Belt Applied;Call Light within Reach;Tray Table within Reach;Phone within Reach   Collaboration Comments CLOF, hematoma     Reviewed rehab education (passport item 1 signed off today in addition to 2 & 5 yesterday), WBAT LLE, walking dog safely at home, FWW vs 4WW discussion, treatment goals, and POC.      Assessment    Patient has improving bed mobility, transfers, and ambulation in room/bathroom.  Decreased LLE weight acceptance initially but improves after initial few steps with external cueing & the first 3 minutes of NuStep usage.  Good participation within structured setting.  Limited L knee X less than L hip flexion, improves with repetition.    Strengths: Able to follow instructions, Alert and oriented, Effective communication skills, Good insight into deficits/needs, Independent prior level of function, Making steady progress towards goals, Motivated for self care and independence, Pleasant and cooperative, Willingly participates in therapeutic activities  Barriers: Decreased endurance, Generalized weakness, Home accessibility, Impaired balance, Limited mobility, Pain, Poor family support    Plan    D/C scheduled for Saturday 4/29/23.  Consider IND amb trial in room.  Work on d/c IRF-[facility], patient passport, HEP handout, and continue education.    Passport items to be completed: safely use mobility device, walk or wheel around home/community, show how to get up/down from the ground,  demonstrate HEP,   Physical Therapy Problems (Active)       Problem: Balance       Dates: Start: 04/20/23         Goal: STG-Within one  week, patient will perform standing home exercise program with handout at SPV level.       Dates: Start: 04/20/23               Problem: Mobility       Dates: Start: 04/18/23         Goal: STG-Within one week, patient will ambulate 2x 50 feet with FWW at SBA level.       Dates: Start: 04/18/23         Goal Note filed on 04/20/23 0846 by Gomez Antoine, PT       PT evaluation completed two days ago on 4/18/23.  Will monitor progress towards STGs.                 Problem: Mobility Transfers       Dates: Start: 04/18/23         Goal: STG-Within one week, patient will perform bed mobility at SBA level including BLE management in/out of bed.       Dates: Start: 04/18/23         Goal Note filed on 04/20/23 0846 by Gomez Antoine, PT       PT evaluation completed two days ago on 4/18/23.  Will monitor progress towards STGs.              Goal: STG-Within one week, patient will sit to stand at SBA level with FWW.       Dates: Start: 04/18/23         Goal Note filed on 04/20/23 0846 by Gomez Antoine, PT       PT evaluation completed two days ago on 4/18/23.  Will monitor progress towards STGs.              Goal: STG-Within one week, patient will transfer bed to chair at SBA level with FWW       Dates: Start: 04/18/23         Goal Note filed on 04/20/23 0846 by Goemz Antoine, PT       PT evaluation completed two days ago on 4/18/23.  Will monitor progress towards STGs.                 Problem: PT-Long Term Goals       Dates: Start: 04/18/23         Goal: LTG-By discharge, patient will ambulate 150 feet with FWW at IND level indoors & outdoors.       Dates: Start: 04/18/23            Goal: LTG-By discharge, patient will transfer one surface to another with FWW at IND level.       Dates: Start: 04/18/23            Goal: LTG-By discharge, patient will perform home exercise program with handout at IND level.       Dates: Start: 04/18/23            Goal: LTG-By discharge, patient will ambulate up/down  4 stairs with B railings at IND level.       Dates: Start: 04/18/23            Goal: LTG-By discharge, patient will transfer in/out of a car with FWW at IND level.       Dates: Start: 04/18/23

## 2023-04-26 NOTE — THERAPY
04/26/23 1501   Therapy Missed   Missed Therapy (Minutes) 60   Reason For Missed Therapy Medical - Patient on Hold from Therapy;Medical - Patient Is Not Medically Stable;Medical - Patient with Nursing  (due to Preliminary EKG result, Medical hold placed by MD, notified therapy scheduling)   Interdisciplinary Plan of Care Collaboration   IDT Collaboration with  Nursing;Certified Nursing Assistant;Occupational Therapist;Physician;Therapy Tech   Collaboration Comments Medical status, Medical hold in place, Resume therapy as directed tomorrow

## 2023-04-27 ENCOUNTER — APPOINTMENT (OUTPATIENT)
Dept: OCCUPATIONAL THERAPY | Facility: REHABILITATION | Age: 73
DRG: 560 | End: 2023-04-27
Attending: PHYSICAL MEDICINE & REHABILITATION
Payer: MEDICARE

## 2023-04-27 ENCOUNTER — APPOINTMENT (OUTPATIENT)
Dept: RADIOLOGY | Facility: REHABILITATION | Age: 73
DRG: 560 | End: 2023-04-27
Attending: PHYSICAL MEDICINE & REHABILITATION
Payer: MEDICARE

## 2023-04-27 ENCOUNTER — APPOINTMENT (OUTPATIENT)
Dept: PHYSICAL THERAPY | Facility: REHABILITATION | Age: 73
DRG: 560 | End: 2023-04-27
Attending: PHYSICAL MEDICINE & REHABILITATION
Payer: MEDICARE

## 2023-04-27 LAB
ANION GAP SERPL CALC-SCNC: 10 MMOL/L (ref 7–16)
APPEARANCE UR: CLEAR
BASOPHILS # BLD AUTO: 0.8 % (ref 0–1.8)
BASOPHILS # BLD: 0.09 K/UL (ref 0–0.12)
BILIRUB UR QL STRIP.AUTO: NEGATIVE
BUN SERPL-MCNC: 13 MG/DL (ref 8–22)
CALCIUM SERPL-MCNC: 9.1 MG/DL (ref 8.5–10.5)
CHLORIDE SERPL-SCNC: 102 MMOL/L (ref 96–112)
CO2 SERPL-SCNC: 24 MMOL/L (ref 20–33)
COLOR UR: YELLOW
CREAT SERPL-MCNC: 0.55 MG/DL (ref 0.5–1.4)
EOSINOPHIL # BLD AUTO: 0.1 K/UL (ref 0–0.51)
EOSINOPHIL NFR BLD: 0.9 % (ref 0–6.9)
ERYTHROCYTE [DISTWIDTH] IN BLOOD BY AUTOMATED COUNT: 53.1 FL (ref 35.9–50)
GFR SERPLBLD CREATININE-BSD FMLA CKD-EPI: 98 ML/MIN/1.73 M 2
GLUCOSE SERPL-MCNC: 96 MG/DL (ref 65–99)
GLUCOSE UR STRIP.AUTO-MCNC: NEGATIVE MG/DL
HCT VFR BLD AUTO: 29.5 % (ref 37–47)
HGB BLD-MCNC: 9.3 G/DL (ref 12–16)
IMM GRANULOCYTES # BLD AUTO: 0.09 K/UL (ref 0–0.11)
IMM GRANULOCYTES NFR BLD AUTO: 0.8 % (ref 0–0.9)
KETONES UR STRIP.AUTO-MCNC: ABNORMAL MG/DL
LEUKOCYTE ESTERASE UR QL STRIP.AUTO: NEGATIVE
LYMPHOCYTES # BLD AUTO: 1.54 K/UL (ref 1–4.8)
LYMPHOCYTES NFR BLD: 13.8 % (ref 22–41)
MAGNESIUM SERPL-MCNC: 1.9 MG/DL (ref 1.5–2.5)
MCH RBC QN AUTO: 30.4 PG (ref 27–33)
MCHC RBC AUTO-ENTMCNC: 31.5 G/DL (ref 33.6–35)
MCV RBC AUTO: 96.4 FL (ref 81.4–97.8)
MICRO URNS: ABNORMAL
MONOCYTES # BLD AUTO: 0.98 K/UL (ref 0–0.85)
MONOCYTES NFR BLD AUTO: 8.8 % (ref 0–13.4)
NEUTROPHILS # BLD AUTO: 8.32 K/UL (ref 2–7.15)
NEUTROPHILS NFR BLD: 74.9 % (ref 44–72)
NITRITE UR QL STRIP.AUTO: NEGATIVE
NRBC # BLD AUTO: 0 K/UL
NRBC BLD-RTO: 0 /100 WBC
NT-PROBNP SERPL IA-MCNC: 576 PG/ML (ref 0–125)
PH UR STRIP.AUTO: 6.5 [PH] (ref 5–8)
PLATELET # BLD AUTO: 633 K/UL (ref 164–446)
PMV BLD AUTO: 9.5 FL (ref 9–12.9)
POTASSIUM SERPL-SCNC: 4.1 MMOL/L (ref 3.6–5.5)
PROT UR QL STRIP: NEGATIVE MG/DL
RBC # BLD AUTO: 3.06 M/UL (ref 4.2–5.4)
RBC UR QL AUTO: NEGATIVE
SODIUM SERPL-SCNC: 136 MMOL/L (ref 135–145)
SP GR UR STRIP.AUTO: 1.02
UROBILINOGEN UR STRIP.AUTO-MCNC: 0.2 MG/DL
WBC # BLD AUTO: 11.1 K/UL (ref 4.8–10.8)

## 2023-04-27 PROCEDURE — 97112 NEUROMUSCULAR REEDUCATION: CPT | Mod: CQ

## 2023-04-27 PROCEDURE — 770010 HCHG ROOM/CARE - REHAB SEMI PRIVAT*

## 2023-04-27 PROCEDURE — 83880 ASSAY OF NATRIURETIC PEPTIDE: CPT

## 2023-04-27 PROCEDURE — 83735 ASSAY OF MAGNESIUM: CPT

## 2023-04-27 PROCEDURE — 97530 THERAPEUTIC ACTIVITIES: CPT | Mod: CQ

## 2023-04-27 PROCEDURE — 700101 HCHG RX REV CODE 250: Performed by: PHYSICAL MEDICINE & REHABILITATION

## 2023-04-27 PROCEDURE — 36415 COLL VENOUS BLD VENIPUNCTURE: CPT

## 2023-04-27 PROCEDURE — 700102 HCHG RX REV CODE 250 W/ 637 OVERRIDE(OP): Performed by: PHYSICAL MEDICINE & REHABILITATION

## 2023-04-27 PROCEDURE — 80048 BASIC METABOLIC PNL TOTAL CA: CPT

## 2023-04-27 PROCEDURE — 700111 HCHG RX REV CODE 636 W/ 250 OVERRIDE (IP): Performed by: PHYSICAL MEDICINE & REHABILITATION

## 2023-04-27 PROCEDURE — 97110 THERAPEUTIC EXERCISES: CPT

## 2023-04-27 PROCEDURE — 97110 THERAPEUTIC EXERCISES: CPT | Mod: CQ

## 2023-04-27 PROCEDURE — A9270 NON-COVERED ITEM OR SERVICE: HCPCS | Performed by: HOSPITALIST

## 2023-04-27 PROCEDURE — 97112 NEUROMUSCULAR REEDUCATION: CPT

## 2023-04-27 PROCEDURE — 97530 THERAPEUTIC ACTIVITIES: CPT

## 2023-04-27 PROCEDURE — 700102 HCHG RX REV CODE 250 W/ 637 OVERRIDE(OP): Performed by: HOSPITALIST

## 2023-04-27 PROCEDURE — 71045 X-RAY EXAM CHEST 1 VIEW: CPT

## 2023-04-27 PROCEDURE — 85025 COMPLETE CBC W/AUTO DIFF WBC: CPT

## 2023-04-27 PROCEDURE — 81003 URINALYSIS AUTO W/O SCOPE: CPT

## 2023-04-27 PROCEDURE — 97116 GAIT TRAINING THERAPY: CPT | Mod: CQ

## 2023-04-27 PROCEDURE — A9270 NON-COVERED ITEM OR SERVICE: HCPCS | Performed by: PHYSICAL MEDICINE & REHABILITATION

## 2023-04-27 PROCEDURE — 99232 SBSQ HOSP IP/OBS MODERATE 35: CPT | Performed by: PHYSICAL MEDICINE & REHABILITATION

## 2023-04-27 RX ADMIN — ENOXAPARIN SODIUM 40 MG: 40 INJECTION SUBCUTANEOUS at 18:01

## 2023-04-27 RX ADMIN — OXYCODONE HYDROCHLORIDE 10 MG: 10 TABLET ORAL at 07:28

## 2023-04-27 RX ADMIN — OXYCODONE HYDROCHLORIDE 10 MG: 10 TABLET ORAL at 19:39

## 2023-04-27 RX ADMIN — OXYCODONE HYDROCHLORIDE 5 MG: 5 TABLET ORAL at 02:49

## 2023-04-27 RX ADMIN — FERROUS SULFATE TAB 325 MG (65 MG ELEMENTAL FE) 325 MG: 325 (65 FE) TAB at 18:01

## 2023-04-27 RX ADMIN — OMEPRAZOLE 20 MG: 20 CAPSULE, DELAYED RELEASE ORAL at 09:33

## 2023-04-27 RX ADMIN — TRAZODONE HYDROCHLORIDE 50 MG: 50 TABLET ORAL at 22:48

## 2023-04-27 RX ADMIN — SENNOSIDES AND DOCUSATE SODIUM 2 TABLET: 50; 8.6 TABLET ORAL at 09:34

## 2023-04-27 RX ADMIN — SENNOSIDES AND DOCUSATE SODIUM 2 TABLET: 50; 8.6 TABLET ORAL at 20:25

## 2023-04-27 RX ADMIN — FERROUS SULFATE TAB 325 MG (65 MG ELEMENTAL FE) 325 MG: 325 (65 FE) TAB at 09:34

## 2023-04-27 RX ADMIN — OXYCODONE HYDROCHLORIDE 10 MG: 10 TABLET ORAL at 15:49

## 2023-04-27 RX ADMIN — LIDOCAINE 1 PATCH: 50 PATCH TOPICAL at 20:24

## 2023-04-27 ASSESSMENT — GAIT ASSESSMENTS
GAIT LEVEL OF ASSIST: STANDBY ASSIST
ASSISTIVE DEVICE: FRONT WHEEL WALKER
ASSISTIVE DEVICE: FRONT WHEEL WALKER
DISTANCE (FEET): 110
GAIT LEVEL OF ASSIST: SUPERVISED

## 2023-04-27 ASSESSMENT — ACTIVITIES OF DAILY LIVING (ADL)
BED_CHAIR_WHEELCHAIR_TRANSFER_DESCRIPTION: ADAPTIVE EQUIPMENT
TOILET_TRANSFER_DESCRIPTION: ADAPTIVE EQUIPMENT;GRAB BAR;INCREASED TIME

## 2023-04-27 ASSESSMENT — PAIN DESCRIPTION - PAIN TYPE: TYPE: ACUTE PAIN

## 2023-04-27 NOTE — CARE PLAN
Problem: Mobility  Goal: STG-Within one week, patient will ambulate 2x 50 feet with FWW at SBA level.  Outcome: Met     Problem: Mobility Transfers  Goal: STG-Within one week, patient will perform bed mobility at SBA level including BLE management in/out of bed.  Outcome: Met  Goal: STG-Within one week, patient will sit to stand at SBA level with FWW.  Outcome: Met  Goal: STG-Within one week, patient will transfer bed to chair at SBA level with FWW  Outcome: Met     Problem: Balance  Goal: STG-Within one week, patient will perform standing home exercise program with handout at SPV level.  Outcome: Not Met  Note: CGA at this time depending on LLE usage and balance

## 2023-04-27 NOTE — THERAPY
Occupational Therapy  Daily Treatment     Patient Name: Lorena No  Age:  73 y.o., Sex:  female  Medical Record #: [id number]  Today's Date: 4/27/2023     Precautions  Precautions: Fall Risk, Weight Bearing As Tolerated Left Lower Extremity  Comments: LLE antalgia    Subjective    Pt is pleasant and motivated to work w/ OT     Objective     04/27/23 0831   OT Charge Group   Charges Yes   OT Neuromuscular Re-education / Balance (Units) 3   OT Therapeutic Exercise (Units) 1   OT Total Time Spent   OT Individual Total Time Spent (Mins) 60   Precautions   Precautions Fall Risk;Weight Bearing As Tolerated Left Lower Extremity   Comments LLE antalgia   Vitals   Pulse (!) 105  (Pt reported she was drinking coffee and thought that was why her HR is slightly elevated at rest. Pt reported she did not feel like she did during her elevated HR episode yesterday.)   Pulse Oximetry 95 %   Pain   Pain Scales 0 to 10 Scale    Pain 0 - 10 Group   Location Hip   Location Orientation Left   Therapist Pain Assessment   (w/ prolonged standing)   Cognition    Level of Consciousness Alert   ABS (Agitated Behavior Scale)   Agitated Behavior Scale Performed No   Sleep/Wake Cycle   Sleep & Rest Awake   Sitting Upper Body Exercises   Sitting Upper Body Exercises Yes   Chest Press 1 set of 10;3 sets of 15;Bilateral;Weight (See Comments for lbs)   Shoulder Press 1 set of 10;3 sets of 15;Bilateral;Weight (See Comments for lbs)   Bicep Curls 3 sets of 15;1 set of 10;Bilateral;Weight (See Comments for lbs)   Comments 3 lb hand weights   Neuro-Muscular Treatments   Neuro-Muscular Treatments Verbal Cuing;Weight Shift Right;Weight Shift Left;Other (See Comments)   Comments Pt tolerated x3 parallel bar balance activities (ladder ball, bowling, and bag toss), focusing on weight shifting R <> L w/ increased WB on LLE. Provided rest breaks between sets.   Balance   Standing Balance (Static) Fair   Standing Balance (Dynamic) Fair   Weight Shift Standing  Good   Interdisciplinary Plan of Care Collaboration   Patient Position at End of Therapy Seated;Chair Alarm On;Self Releasing Lap Belt Applied;Tray Table within Reach;Call Light within Reach;Phone within Reach   Strengths & Barriers   Strengths Able to follow instructions;Alert and oriented;Good insight into deficits/needs;Independent prior level of function;Making steady progress towards goals;Manages pain appropriately;Motivated for self care and independence;Pleasant and cooperative;Supportive family;Willingly participates in therapeutic activities     Assessment    Pt seen for OT tx, focusing on balance and UE ex. Pt tolerated activities well w/ a few rest breaks between sets. Pt is overall SBA/CGA in the parallel bars and completed UE ex in w/c. Provided v/c to WB on LLE. Continue OT POC.    Strengths: Able to follow instructions, Alert and oriented, Good insight into deficits/needs, Independent prior level of function, Making steady progress towards goals, Manages pain appropriately, Motivated for self care and independence, Pleasant and cooperative, Supportive family, Willingly participates in therapeutic activities    Plan    Standing balance  ADLs/IADLs  Overhead reaching activities  UE strengthening    Passport items to be completed:  Perform bathroom transfers, complete dressing, complete feeding, get ready for the day, prepare a simple meal, participate in household tasks, adapt home for safety needs, demonstrate home exercise program, complete caregiver training     Occupational Therapy Goals (Active)       Problem: Dressing       Dates: Start: 04/18/23         Goal: STG-Within one week, patient will dress LB with CGA overall with AE/DME as needed.       Dates: Start: 04/18/23               Problem: OT Long Term Goals       Dates: Start: 04/18/23         Goal: LTG-By discharge, patient will complete basic self care tasks with mod I overall using AE/DME as needed.       Dates: Start: 04/18/23             Goal: LTG-By discharge, patient will perform bathroom transfers with mod I overall with AE/DME as needed.       Dates: Start: 04/18/23            Goal: LTG-By discharge, patient will complete basic home management with supervision-mod I overall using AE/DME as needed.       Dates: Start: 04/18/23

## 2023-04-27 NOTE — CARE PLAN
Problem: Dressing  Goal: STG-Within one week, patient will dress LB with CGA overall with AE/DME as needed.  Outcome: Not Met     Problem: Bathing  Goal: STG-Within one week, patient will bathe with CGA overall with AE/DME as needed.  Outcome: Met     Problem: Toileting  Goal: STG-Within one week, patient will complete toileting tasks with CGA overall with AE/DME as needed.  Outcome: Met     Problem: Functional Transfers  Goal: STG-Within one week, patient will transfer to toilet with SBA overall with AE/DME as needed.  Outcome: Met     Problem: IADL's  Goal: STG-Within one week, patient will access kitchen area with SBA overall with AE/DME as needed.  Outcome: Met

## 2023-04-27 NOTE — DISCHARGE PLANNING
CM met with patient to update on IDT and DC date of 4/29/23.  Confirmed daughter Tiffanie will be the one  patient up on day of DC and will do family training on day of DC.  Therapy  will call Tiffanie to set up.  CM will continue to monitor for DC needs.     Yes...

## 2023-04-27 NOTE — PROGRESS NOTES
"  Physical Medicine & Rehabilitation Progress Note    Encounter Date: 4/27/2023    Chief Complaint: Decreased mobility, hip pain    Interval Events (Subjective):  Patient sitting up in room. Reviewed AM Labs with patient and improving anemia but new leukocytosis. Checked CXR which per my read shows no process ongoing, UA pending. Discussed would still plan for discharge as leukocytosis is very mild. Discussed otherwise EKG was sinus rhythm and BNP was near normal. Discussed follow-up with cardiology as outpatient.     _____________________________________  Interdisciplinary Team Conference   Most recent IDT on 4/27/2023    I, Shaan Burton M.D./Ph.D., was present and led the interdisciplinary team conference on 4/27/2023.  I led the IDT conference and agree with the IDT conference documentation and plan of care as noted below.     Nursing:  Diet Current Diet Order   Procedures    Diet Order Diet: Level 6 - Soft and Bite Sized; Liquid level: Level 0 - Thin       Eating ADL  (Set-up)      % of Last Meal  Oral Nutrition: Between 50-75% Consumed   Sleep    Bowel Last BM: 04/25/23   Bladder Continent   Barriers to Discharge Home:  Pain    Physical Therapy:  Bed Mobility    Transfers Modified Independent  Adaptive equipment (FWW)   Mobility Nestor   Stairs    Barriers to Discharge Home:  Pain limited   Needs family training for stairs    HH    Occupational Therapy:  Grooming Modified Independent, Seated   Bathing Standby Assist   UB Dressing Independent   LB Dressing Minimal Assist (req A for doffing pants on LLE)   Toileting Supervision   Shower & Transfer    Barriers to Discharge Home:  Depends on pain    HH    Case Management:  Continues to work on disposition and DME needs.      Discharge Date/Disposition:  4/29/23  _____________________________________      Objective:  VITAL SIGNS: /67   Pulse 83   Temp 36.7 °C (98 °F) (Oral)   Resp 16   Ht 1.575 m (5' 2\")   Wt 57.1 kg (125 lb 14.4 oz)   SpO2 93% "   BMI 23.03 kg/m²   Gen: NAD  Psych: Mood and affect appropriate  CV: RRR, 0 edema  Resp: CTAB, no upper airway sounds  Abd: NTND  Neuro: AOx4, following commands    Laboratory Values:  Recent Results (from the past 72 hour(s))   CBC WITH DIFFERENTIAL    Collection Time: 04/25/23  6:15 AM   Result Value Ref Range    WBC 9.2 4.8 - 10.8 K/uL    RBC 2.67 (L) 4.20 - 5.40 M/uL    Hemoglobin 8.4 (L) 12.0 - 16.0 g/dL    Hematocrit 25.6 (L) 37.0 - 47.0 %    MCV 95.9 81.4 - 97.8 fL    MCH 31.5 27.0 - 33.0 pg    MCHC 32.8 (L) 33.6 - 35.0 g/dL    RDW 54.4 (H) 35.9 - 50.0 fL    Platelet Count 516 (H) 164 - 446 K/uL    MPV 9.7 9.0 - 12.9 fL    Neutrophils-Polys 66.60 44.00 - 72.00 %    Lymphocytes 18.70 (L) 22.00 - 41.00 %    Monocytes 11.50 0.00 - 13.40 %    Eosinophils 1.50 0.00 - 6.90 %    Basophils 1.00 0.00 - 1.80 %    Immature Granulocytes 0.70 0.00 - 0.90 %    Nucleated RBC 0.00 /100 WBC    Neutrophils (Absolute) 6.11 2.00 - 7.15 K/uL    Lymphs (Absolute) 1.72 1.00 - 4.80 K/uL    Monos (Absolute) 1.06 (H) 0.00 - 0.85 K/uL    Eos (Absolute) 0.14 0.00 - 0.51 K/uL    Baso (Absolute) 0.09 0.00 - 0.12 K/uL    Immature Granulocytes (abs) 0.06 0.00 - 0.11 K/uL    NRBC (Absolute) 0.00 K/uL   Basic Metabolic Panel    Collection Time: 04/25/23  6:15 AM   Result Value Ref Range    Sodium 139 135 - 145 mmol/L    Potassium 3.9 3.6 - 5.5 mmol/L    Chloride 105 96 - 112 mmol/L    Co2 24 20 - 33 mmol/L    Glucose 97 65 - 99 mg/dL    Bun 12 8 - 22 mg/dL    Creatinine 0.54 0.50 - 1.40 mg/dL    Calcium 8.9 8.5 - 10.5 mg/dL    Anion Gap 10.0 7.0 - 16.0   MAGNESIUM    Collection Time: 04/25/23  6:15 AM   Result Value Ref Range    Magnesium 2.0 1.5 - 2.5 mg/dL   ESTIMATED GFR    Collection Time: 04/25/23  6:15 AM   Result Value Ref Range    GFR (CKD-EPI) 98 >60 mL/min/1.73 m 2   EKG (IP)    Collection Time: 04/26/23  2:42 PM   Result Value Ref Range    Report       [facility]    Test Date:  2023-04-26  Pt Name:    OLIMPIA ALAS                  Department: [facility]  MRN:        [id number]                      Room:       [facility]  Gender:     Female                       Technician: [id number] WT  :        1950                   Requested By:EDILSON MASCORRO  Order #:    [id number]                    Reading MD: Dano Browne MD    Measurements  Intervals                                Axis  Rate:       99                           P:          71  NC:         168                          QRS:        59  QRSD:       85                           T:          37  QT:         340  QTc:        437    Interpretive Statements  Sinus rhythm  Nonspecific ST wave changes  Electronically Signed On 2023 18:16:52 PDT by Dano Browne MD     CBC WITH DIFFERENTIAL    Collection Time: 23  6:25 AM   Result Value Ref Range    WBC 11.1 (H) 4.8 - 10.8 K/uL    RBC 3.06 (L) 4.20 - 5.40 M/uL    Hemoglobin 9.3 (L) 12.0 - 16.0 g/dL    Hematocrit 29.5 (L) 37.0 - 47.0 %    MCV 96.4 81.4 - 97.8 fL    MCH 30.4 27.0 - 33.0 pg    MCHC 31.5 (L) 33.6 - 35.0 g/dL    RDW 53.1 (H) 35.9 - 50.0 fL    Platelet Count 633 (H) 164 - 446 K/uL    MPV 9.5 9.0 - 12.9 fL    Neutrophils-Polys 74.90 (H) 44.00 - 72.00 %    Lymphocytes 13.80 (L) 22.00 - 41.00 %    Monocytes 8.80 0.00 - 13.40 %    Eosinophils 0.90 0.00 - 6.90 %    Basophils 0.80 0.00 - 1.80 %    Immature Granulocytes 0.80 0.00 - 0.90 %    Nucleated RBC 0.00 /100 WBC    Neutrophils (Absolute) 8.32 (H) 2.00 - 7.15 K/uL    Lymphs (Absolute) 1.54 1.00 - 4.80 K/uL    Monos (Absolute) 0.98 (H) 0.00 - 0.85 K/uL    Eos (Absolute) 0.10 0.00 - 0.51 K/uL    Baso (Absolute) 0.09 0.00 - 0.12 K/uL    Immature Granulocytes (abs) 0.09 0.00 - 0.11 K/uL    NRBC (Absolute) 0.00 K/uL   Basic Metabolic Panel    Collection Time: 23  6:25 AM   Result Value Ref Range    Sodium 136 135 - 145 mmol/L    Potassium 4.1 3.6 - 5.5 mmol/L    Chloride 102 96 - 112 mmol/L    Co2 24 20 - 33 mmol/L    Glucose 96 65 - 99 mg/dL    Bun 13 8 - 22 mg/dL     Creatinine 0.55 0.50 - 1.40 mg/dL    Calcium 9.1 8.5 - 10.5 mg/dL    Anion Gap 10.0 7.0 - 16.0   proBrain Natriuretic Peptide, NT    Collection Time: 04/27/23  6:25 AM   Result Value Ref Range    NT-proBNP 576 (H) 0 - 125 pg/mL   MAGNESIUM    Collection Time: 04/27/23  6:25 AM   Result Value Ref Range    Magnesium 1.9 1.5 - 2.5 mg/dL   ESTIMATED GFR    Collection Time: 04/27/23  6:25 AM   Result Value Ref Range    GFR (CKD-EPI) 98 >60 mL/min/1.73 m 2         Medications:  Scheduled Medications   Medication Dose Frequency    ferrous sulfate  325 mg BID WITH MEALS    Pharmacy Consult Request  1 Each PHARMACY TO DOSE    senna-docusate  2 Tablet BID    omeprazole  20 mg DAILY    enoxaparin (LOVENOX) injection  40 mg DAILY AT 1800    lidocaine  1 Patch Q24HR     PRN medications: tizanidine, Respiratory Therapy Consult, hydrALAZINE, senna-docusate **AND** polyethylene glycol/lytes **AND** magnesium hydroxide **AND** bisacodyl, mag hydrox-al hydrox-simeth, ondansetron **OR** ondansetron, traZODone, sodium chloride, oxyCODONE immediate-release **OR** oxyCODONE immediate-release, [COMPLETED] celecoxib **FOLLOWED BY** celecoxib, acetaminophen    Diet:  Current Diet Order   Procedures    Diet Order Diet: Level 6 - Soft and Bite Sized; Liquid level: Level 0 - Thin       Medical Decision Making and Plan:  Left hip fracture - Patient with fall on 4/13/23 with severe hip pain found to have fracture s/p IMN with Dr. Isidro on 4/14/23. Patient is WBAT.  -PT and OT for mobility and ADLs. Per guidelines, 15 hours per week between PT, OT and/or SLP.  -Follow-up Dr. Isidro.      Anemia - Check AM CBC - 7.0. Severe worsening anemia puts patient at high risk for further decline including respiratory failure and shock. Consult hospitalist  -Recheck CBC, Fe panel, FOBT. Low Fe, ongoing severe anemia. Repeat 4/21 - 7.5. Will recheck on Fe supplement. Improved to 8.4     Tachycardia - New on 4/26/23, reportedly had an issue in the past but  does not know diagnosis. Will check EKG, AM labs including BNP and Magnesium  -EKG normal sinus rhythm. BNP normal. Mag and BMP normal.     Leukocytosis - Check AM CBC. Most likely reactionary - improved. Resolved  - 11.2 on 4/27, UA sent, CXR negative.      Azotemia - Check AM CMP - resolved     Hypocalcemia - Check AM CMP - 9.1, improved.      Hypophosphatemia - Check AM Mag/Phosph - improved. Not on supplements     Rhabdomyolysis - Check AM CPK - 728 trending down.     Pain - APAP/Oxycodone PRN and Celebrex BID. Discontinue scheduled Celebrex, PRN now available.     Muscle spasms - Patient having severe muscle spasms putting her at risk for increased falls and injury due to hip muscles buckling and given her recent hip fracture she has less ability to protect herself. Will start Tizanidine. Tizanidine is a high risk medication which must have LFTs monitored regularly. Will check AM CMP - Cr and LFTs stable. Continue Tizanidine.     Skin - Patient at risk for skin breakdown due to debility in areas including sacrum, achilles, elbows and head in addition to other sites. Nursing to assess skin daily.      GI Ppx - Patient on Prilosec for GERD prophylaxis. Patient on Senna-docusate for constipation prophylaxis.      DVT Ppx - Patient Lovenox on transfer.  ____________________________________    T. Rolando Burton MD/PhD  [facility]   [facility]   ____________________________________

## 2023-04-27 NOTE — CARE PLAN
Problem: Recreation Therapy  Goal: STG-Within one week, patient will identify two new leisure interests.   Outcome: Met  Goal: STG-Within one week, patient will identify two new coping skills.   Outcome: Met  Goal: LTG-By discharge, patient will identify three new leisure interests.   Outcome: Met  Goal: LTG-By discharge, patient will identify and demonstrate three new coping skills.   Outcome: Met

## 2023-04-27 NOTE — CARE PLAN
"  Problem: Fall Risk - Rehab  Goal: Patient will remain free from falls  Outcome: Progressing  Note: Martinez Amador Fall risk Assessment Score: 11    Moderate fall risk Interventions  - Bed and strip alarm   - Yellow sign by the door   - Yellow wrist band \"Fall risk\"  - Room near to the nurse station  - Do not leave patient unattended in the bathroom  - Fall risk education provided     Problem: Pain - Standard  Goal: Alleviation of pain or a reduction in pain to the patient’s comfort goal  Outcome: Progressing  Note: Assessed for pain and discomfort, medicated with oxycodone 5 mg p.o for left hip pain , cont monitored.   The patient is Stable - Low risk of patient condition declining or worsening    Shift Goals  Clinical Goals: Pain management  Patient Goals: Pain management    Progress made toward(s) clinical / shift goals:  Pt free from fall and injury.    "

## 2023-04-27 NOTE — THERAPY
"Physical Therapy   Daily Treatment     Patient Name: Lorena No  Age:  73 y.o., Sex:  female  Medical Record #: [id number]  Today's Date: 4/27/2023     Precautions  Precautions: Fall Risk, Weight Bearing As Tolerated Left Lower Extremity  Comments: LLE antalgia    Subjective    Pt seated in wc upon arrival, agreeable to PT    Pt reports she needs to practice stairs     Objective       04/27/23 0931   PT Charge Group   PT Gait Training (Units) 1   PT Therapeutic Activities (Units) 1   Supervising Physical Therapist Adair Pineda   PT Total Time Spent   PT Individual Total Time Spent (Mins) 30   Pain   Intervention Emotional Support  (encouraged pt to request ice post PT)   Gait Functional Level of Assist    Gait Level Of Assist Standby Assist   Assistive Device Front Wheel Walker   Distance (Feet) 110   # of Times Distance was Traveled 1   Deviation Decreased Base Of Support;Antalgic;Decreased Heel Strike;Bradykinetic   Stairs Functional Level of Assist   Level of Assist with Stairs Contact Guard Assist   # of Stairs Climbed 20  (10 stairs x 2 trials seated rest btwn bouts(combined 6\" and 4\" stairs))   Stairs Description Extra time;Hand rails;Limited by fatigue;Supervision for safety;Verbal cueing  (B HR)   Interdisciplinary Plan of Care Collaboration   IDT Collaboration with  Occupational Therapist   Patient Position at End of Therapy Seated;Self Releasing Lap Belt Applied   Collaboration Comments level of assist for stairs       Reviewed hip abd exercises to target glute med- demo/edu/instructed pt on seated and sidelying with or without resistance band, glute squeezing isometrics and AROM   Assessment      Pt is limited by hip stabilizer weakness on the L, she demos very little glute activation with amb and will benefit from continued strengthening to the hip girdle to improve efficiency and quality of gait. The patient cont to use a vaulting pattern to ascend the stairs and requires CGA for safety. Pt will benefit " from caregiver training before dc  Strengths: Able to follow instructions, Alert and oriented, Effective communication skills, Good insight into deficits/needs, Independent prior level of function, Making steady progress towards goals, Motivated for self care and independence, Pleasant and cooperative, Willingly participates in therapeutic activities  Barriers: Decreased endurance, Generalized weakness, Home accessibility, Impaired balance, Limited mobility, Pain, Poor family support    Plan    D/c to home Saturday, issue seated and standing HEP, floor recovery.      Passport items to be completed:  show how to get up/down from the ground, ensure home is accessible, complete caregiver training    Physical Therapy Problems (Active)       Problem: Balance       Dates: Start: 04/20/23         Goal: STG-Within one week, patient will perform standing home exercise program with handout at SPV level.       Dates: Start: 04/20/23         Goal Note filed on 04/26/23 1700 by Gomez Antoine, PT       CGA at this time depending on LLE usage and balance                 Problem: PT-Long Term Goals       Dates: Start: 04/18/23         Goal: LTG-By discharge, patient will ambulate 150 feet with FWW at IND level indoors & outdoors.       Dates: Start: 04/18/23            Goal: LTG-By discharge, patient will transfer one surface to another with FWW at IND level.       Dates: Start: 04/18/23            Goal: LTG-By discharge, patient will perform home exercise program with handout at IND level.       Dates: Start: 04/18/23            Goal: LTG-By discharge, patient will ambulate up/down 4 stairs with B railings at IND level.       Dates: Start: 04/18/23            Goal: LTG-By discharge, patient will transfer in/out of a car with FWW at IND level.       Dates: Start: 04/18/23

## 2023-04-27 NOTE — PROGRESS NOTES
Received shift report and assumed care of patient.  Patient awake, calm and stable, currently positioned in bed for comfort and safety; call light within reach.  8/10 left hip pain at this time. See mar for medication. Will continue to monitor.

## 2023-04-27 NOTE — THERAPY
"Physical Therapy   Daily Treatment     Patient Name: Lorena No  Age:  73 y.o., Sex:  female  Medical Record #: [id number]  Today's Date: 4/27/2023     Precautions  Precautions: Fall Risk, Weight Bearing As Tolerated Left Lower Extremity  Comments: LLE antalgia    Subjective    Pt was seen [phone], [phone]. Agreeable to both sessions.     Objective       04/27/23 0701 04/27/23 1101   PT Charge Group   PT Gait Training (Units) 2  --    PT Therapeutic Exercise (Units) 2  --    PT Neuromuscular Re-Education / Balance (Units)  --  2   Supervising Physical Therapist Lizabeth English   PT Total Time Spent   PT Individual Total Time Spent (Mins) 60 30   Pain 0 - 10 Group   Location Hip Hip   Location Orientation Left Left   Therapist Pain Assessment During Activity;Nurse Notified During Activity   Cognition    Level of Consciousness Alert  --    Gait Functional Level of Assist    Gait Level Of Assist Supervised  --    Assistive Device Front Wheel Walker  --    Distance (Feet)   (20+150x2)  --    Deviation Antalgic;Bradykinetic;Decreased Heel Strike;Decreased Base Of Support  (cues for KERRI and WB)  --    Stairs Functional Level of Assist   Level of Assist with Stairs Contact Guard Assist  --    # of Stairs Climbed 8  (6\" 2x4 w/ BHRs)  --    Stairs Description Extra time;Hand rails;Limited by fatigue;Supervision for safety;Verbal cueing  --    Transfer Functional Level of Assist   Bed, Chair, Wheelchair Transfer Modified Independent  --    Bed Chair Wheelchair Transfer Description Adaptive equipment  (FWW)  --    Toilet Transfers Modified Independent  --    Toilet Transfer Description Adaptive equipment;Grab bar;Increased time  (FWW)  --    Supine Lower Body Exercise   Hip Abduction 2 sets of 10;Left  --    Hip Adduction  2 sets of 15;Bilateral  --    Knee to Chest 2 sets of 10;Bilateral  --    Heel Slide 2 sets of 10;Bilateral  --    Gluteal Isometrics 2 sets of 15;Bilateral  --    Quadriceps Isometrics 1 set of " 15;Left  --    Bed Mobility    Supine to Sit Modified Independent  --    Sit to Supine Modified Independent  --    Sit to Stand Modified Independent  (FWW)  --    Scooting Modified Independent  --    Rolling Modified Independent  --    Neuro-Muscular Treatments   Neuro-Muscular Treatments  --  Anterior weight shift;Postural Facilitation;Weight Shift Right;Weight Shift Left   Comments  --  static standing balance w/ no UE support CGA throughout- FA/FT 1 min each. FA and FT w/ reaching up/side <> side. FT w/ trunk rotation 90 degrees both sides. semi tandem stance 30 sec w/ each LE leading.   Interdisciplinary Plan of Care Collaboration   IDT Collaboration with  Nursing;Occupational Therapist  --    Patient Position at End of Therapy Seated Seated   Collaboration Comments re: pain, CLOF  --          Assessment    Pt tolerated both sessions well, is now Marquise in room w/ FWW. Education on if pain more than 5/10 and tachycardia like yesterday, use the call light. Pt's main barrier remains to be pain and avoiding WB on LLE.    Strengths: Able to follow instructions, Alert and oriented, Effective communication skills, Good insight into deficits/needs, Independent prior level of function, Making steady progress towards goals, Motivated for self care and independence, Pleasant and cooperative, Willingly participates in therapeutic activities  Barriers: Decreased endurance, Generalized weakness, Home accessibility, Impaired balance, Limited mobility, Pain, Poor family support    Plan    D/c to home Saturday, issue seated and standing HEP, floor recovery.     Passport items to be completed:  show how to get up/down from the ground, ensure home is accessible, complete caregiver training    Physical Therapy Problems (Active)       Problem: Balance       Dates: Start: 04/20/23         Goal: STG-Within one week, patient will perform standing home exercise program with handout at SPV level.       Dates: Start: 04/20/23         Goal  Note filed on 04/26/23 1700 by Gomez Antoine, PT       CGA at this time depending on LLE usage and balance                 Problem: PT-Long Term Goals       Dates: Start: 04/18/23         Goal: LTG-By discharge, patient will ambulate 150 feet with FWW at IND level indoors & outdoors.       Dates: Start: 04/18/23            Goal: LTG-By discharge, patient will transfer one surface to another with FWW at IND level.       Dates: Start: 04/18/23            Goal: LTG-By discharge, patient will perform home exercise program with handout at IND level.       Dates: Start: 04/18/23            Goal: LTG-By discharge, patient will ambulate up/down 4 stairs with B railings at IND level.       Dates: Start: 04/18/23            Goal: LTG-By discharge, patient will transfer in/out of a car with FWW at IND level.       Dates: Start: 04/18/23

## 2023-04-27 NOTE — THERAPY
Occupational Therapy  Daily Treatment     Patient Name: Lorena No  Age:  73 y.o., Sex:  female  Medical Record #: [id number]  Today's Date: 4/27/2023     Precautions  Precautions: (P) Fall Risk, Weight Bearing As Tolerated Left Lower Extremity  Comments: (P) LLE antalgia         Subjective    Pt asleep upon arrival, once awoken agreeable to OT session.     Objective       04/27/23 1301   OT Charge Group   OT Therapy Activity (Units) 1   OT Therapeutic Exercise (Units) 1   OT Total Time Spent   OT Individual Total Time Spent (Mins) 30   Precautions   Precautions Fall Risk;Weight Bearing As Tolerated Left Lower Extremity   Comments LLE antalgia   Functional Level of Assist   Toileting Modified Independent   Bed, Chair, Wheelchair Transfer Modified Independent   Sitting Lower Body Exercises   Comments Provided pt with UE HEP this date with Green theraband, including elbow flexion/ext, shoulder flex/ext, chest press, and chair push up. Pt able to compelte 1 set of 10 of each eeither bilaterally or wiht each indv UE.   Interdisciplinary Plan of Care Collaboration   Patient Position at End of Therapy In Bed;Call Light within Reach;Tray Table within Reach;Phone within Reach       Provided pt with handout for 26inch reacher as pt utilizes to get pants off and on. Also discussed local options to ensure pt has upon dc to be able to dress ind.    Education: Checked off all items on passport to independence booklet this session with the following recommendations: mod I for mobility within home including transfers on and off toilet, may req A for IADLs during transition but likely able to complete, anticipate supervision-mod I with all BADLs, recommend reacher for at home and pt to cont to utilize shower chair.    Assessment    Pt receptive to all edu and able to demo back UE HEP. Discussed dc and pt likely not needing FT for OT though in collaboration with PT pt likely needs day of dc family training for stair negotiation.  Discussed with CM.    Strengths: Able to follow instructions, Alert and oriented, Good insight into deficits/needs, Independent prior level of function, Making steady progress towards goals, Manages pain appropriately, Motivated for self care and independence, Pleasant and cooperative, Supportive family, Willingly participates in therapeutic activities    Plan    DC IRFs including shower- anticipate supervision-mod I at FWW at this time including retrieval of clothing, use of shower bench      Occupational Therapy Goals (Active)       Problem: Dressing       Dates: Start: 04/18/23         Goal: STG-Within one week, patient will dress LB with CGA overall with AE/DME as needed.       Dates: Start: 04/18/23               Problem: OT Long Term Goals       Dates: Start: 04/18/23         Goal: LTG-By discharge, patient will complete basic self care tasks with mod I overall using AE/DME as needed.       Dates: Start: 04/18/23            Goal: LTG-By discharge, patient will perform bathroom transfers with mod I overall with AE/DME as needed.       Dates: Start: 04/18/23            Goal: LTG-By discharge, patient will complete basic home management with supervision-mod I overall using AE/DME as needed.       Dates: Start: 04/18/23

## 2023-04-28 ENCOUNTER — APPOINTMENT (OUTPATIENT)
Dept: PHYSICAL THERAPY | Facility: REHABILITATION | Age: 73
DRG: 560 | End: 2023-04-28
Attending: PHYSICAL MEDICINE & REHABILITATION
Payer: MEDICARE

## 2023-04-28 ENCOUNTER — APPOINTMENT (OUTPATIENT)
Dept: OCCUPATIONAL THERAPY | Facility: REHABILITATION | Age: 73
DRG: 560 | End: 2023-04-28
Attending: PHYSICAL MEDICINE & REHABILITATION
Payer: MEDICARE

## 2023-04-28 ENCOUNTER — PHARMACY VISIT (OUTPATIENT)
Dept: PHARMACY | Facility: MEDICAL CENTER | Age: 73
End: 2023-04-28
Payer: COMMERCIAL

## 2023-04-28 LAB
ALBUMIN SERPL BCP-MCNC: 3.5 G/DL (ref 3.2–4.9)
ALBUMIN/GLOB SERPL: 1.1 G/DL
ALP SERPL-CCNC: 117 U/L (ref 30–99)
ALT SERPL-CCNC: 14 U/L (ref 2–50)
ANION GAP SERPL CALC-SCNC: 10 MMOL/L (ref 7–16)
AST SERPL-CCNC: 12 U/L (ref 12–45)
BASOPHILS # BLD AUTO: 0.8 % (ref 0–1.8)
BASOPHILS # BLD: 0.08 K/UL (ref 0–0.12)
BILIRUB SERPL-MCNC: 0.7 MG/DL (ref 0.1–1.5)
BUN SERPL-MCNC: 14 MG/DL (ref 8–22)
CALCIUM ALBUM COR SERPL-MCNC: 9.3 MG/DL (ref 8.5–10.5)
CALCIUM SERPL-MCNC: 8.9 MG/DL (ref 8.5–10.5)
CHLORIDE SERPL-SCNC: 103 MMOL/L (ref 96–112)
CO2 SERPL-SCNC: 22 MMOL/L (ref 20–33)
CREAT SERPL-MCNC: 0.56 MG/DL (ref 0.5–1.4)
EOSINOPHIL # BLD AUTO: 0.1 K/UL (ref 0–0.51)
EOSINOPHIL NFR BLD: 1 % (ref 0–6.9)
ERYTHROCYTE [DISTWIDTH] IN BLOOD BY AUTOMATED COUNT: 51.1 FL (ref 35.9–50)
GFR SERPLBLD CREATININE-BSD FMLA CKD-EPI: 97 ML/MIN/1.73 M 2
GLOBULIN SER CALC-MCNC: 3.1 G/DL (ref 1.9–3.5)
GLUCOSE SERPL-MCNC: 98 MG/DL (ref 65–99)
HCT VFR BLD AUTO: 28.6 % (ref 37–47)
HGB BLD-MCNC: 9 G/DL (ref 12–16)
IMM GRANULOCYTES # BLD AUTO: 0.05 K/UL (ref 0–0.11)
IMM GRANULOCYTES NFR BLD AUTO: 0.5 % (ref 0–0.9)
LYMPHOCYTES # BLD AUTO: 1.49 K/UL (ref 1–4.8)
LYMPHOCYTES NFR BLD: 15 % (ref 22–41)
MCH RBC QN AUTO: 30.2 PG (ref 27–33)
MCHC RBC AUTO-ENTMCNC: 31.5 G/DL (ref 33.6–35)
MCV RBC AUTO: 96 FL (ref 81.4–97.8)
MONOCYTES # BLD AUTO: 0.82 K/UL (ref 0–0.85)
MONOCYTES NFR BLD AUTO: 8.2 % (ref 0–13.4)
NEUTROPHILS # BLD AUTO: 7.41 K/UL (ref 2–7.15)
NEUTROPHILS NFR BLD: 74.5 % (ref 44–72)
NRBC # BLD AUTO: 0 K/UL
NRBC BLD-RTO: 0 /100 WBC
PHOSPHATE SERPL-MCNC: 4.1 MG/DL (ref 2.5–4.5)
PLATELET # BLD AUTO: 601 K/UL (ref 164–446)
PMV BLD AUTO: 9.5 FL (ref 9–12.9)
POTASSIUM SERPL-SCNC: 3.9 MMOL/L (ref 3.6–5.5)
PROT SERPL-MCNC: 6.6 G/DL (ref 6–8.2)
RBC # BLD AUTO: 2.98 M/UL (ref 4.2–5.4)
SODIUM SERPL-SCNC: 135 MMOL/L (ref 135–145)
WBC # BLD AUTO: 10 K/UL (ref 4.8–10.8)

## 2023-04-28 PROCEDURE — 97535 SELF CARE MNGMENT TRAINING: CPT

## 2023-04-28 PROCEDURE — 97530 THERAPEUTIC ACTIVITIES: CPT | Mod: CQ

## 2023-04-28 PROCEDURE — 700101 HCHG RX REV CODE 250: Performed by: PHYSICAL MEDICINE & REHABILITATION

## 2023-04-28 PROCEDURE — 97116 GAIT TRAINING THERAPY: CPT | Mod: CQ

## 2023-04-28 PROCEDURE — 99233 SBSQ HOSP IP/OBS HIGH 50: CPT | Performed by: PHYSICAL MEDICINE & REHABILITATION

## 2023-04-28 PROCEDURE — 770010 HCHG ROOM/CARE - REHAB SEMI PRIVAT*

## 2023-04-28 PROCEDURE — 80053 COMPREHEN METABOLIC PANEL: CPT

## 2023-04-28 PROCEDURE — 85025 COMPLETE CBC W/AUTO DIFF WBC: CPT

## 2023-04-28 PROCEDURE — 700111 HCHG RX REV CODE 636 W/ 250 OVERRIDE (IP): Performed by: PHYSICAL MEDICINE & REHABILITATION

## 2023-04-28 PROCEDURE — A9270 NON-COVERED ITEM OR SERVICE: HCPCS | Performed by: HOSPITALIST

## 2023-04-28 PROCEDURE — 97110 THERAPEUTIC EXERCISES: CPT | Mod: CQ

## 2023-04-28 PROCEDURE — 36415 COLL VENOUS BLD VENIPUNCTURE: CPT

## 2023-04-28 PROCEDURE — 97530 THERAPEUTIC ACTIVITIES: CPT

## 2023-04-28 PROCEDURE — RXMED WILLOW AMBULATORY MEDICATION CHARGE: Performed by: PHYSICAL MEDICINE & REHABILITATION

## 2023-04-28 PROCEDURE — 84100 ASSAY OF PHOSPHORUS: CPT

## 2023-04-28 PROCEDURE — 700102 HCHG RX REV CODE 250 W/ 637 OVERRIDE(OP): Performed by: HOSPITALIST

## 2023-04-28 PROCEDURE — A9270 NON-COVERED ITEM OR SERVICE: HCPCS | Performed by: PHYSICAL MEDICINE & REHABILITATION

## 2023-04-28 PROCEDURE — 700102 HCHG RX REV CODE 250 W/ 637 OVERRIDE(OP): Performed by: PHYSICAL MEDICINE & REHABILITATION

## 2023-04-28 RX ORDER — OXYCODONE HYDROCHLORIDE 10 MG/1
10 TABLET ORAL EVERY 6 HOURS PRN
Qty: 21 TABLET | Refills: 0 | Status: SHIPPED | OUTPATIENT
Start: 2023-04-28 | End: 2023-05-05

## 2023-04-28 RX ORDER — TRAZODONE HYDROCHLORIDE 50 MG/1
50 TABLET ORAL
Qty: 30 TABLET | Refills: 2 | Status: SHIPPED | OUTPATIENT
Start: 2023-04-28

## 2023-04-28 RX ORDER — FERROUS SULFATE 325(65) MG
325 TABLET ORAL 2 TIMES DAILY WITH MEALS
Qty: 30 TABLET | COMMUNITY
Start: 2023-04-28

## 2023-04-28 RX ADMIN — OXYCODONE HYDROCHLORIDE 10 MG: 10 TABLET ORAL at 11:10

## 2023-04-28 RX ADMIN — FERROUS SULFATE TAB 325 MG (65 MG ELEMENTAL FE) 325 MG: 325 (65 FE) TAB at 17:09

## 2023-04-28 RX ADMIN — FERROUS SULFATE TAB 325 MG (65 MG ELEMENTAL FE) 325 MG: 325 (65 FE) TAB at 09:31

## 2023-04-28 RX ADMIN — ENOXAPARIN SODIUM 40 MG: 40 INJECTION SUBCUTANEOUS at 17:07

## 2023-04-28 RX ADMIN — OXYCODONE HYDROCHLORIDE 10 MG: 10 TABLET ORAL at 20:36

## 2023-04-28 RX ADMIN — SENNOSIDES AND DOCUSATE SODIUM 2 TABLET: 50; 8.6 TABLET ORAL at 09:31

## 2023-04-28 RX ADMIN — ACETAMINOPHEN 650 MG: 325 TABLET ORAL at 17:29

## 2023-04-28 RX ADMIN — LIDOCAINE 1 PATCH: 50 PATCH TOPICAL at 20:37

## 2023-04-28 RX ADMIN — OMEPRAZOLE 20 MG: 20 CAPSULE, DELAYED RELEASE ORAL at 09:31

## 2023-04-28 RX ADMIN — SENNOSIDES AND DOCUSATE SODIUM 2 TABLET: 50; 8.6 TABLET ORAL at 20:36

## 2023-04-28 RX ADMIN — OXYCODONE HYDROCHLORIDE 10 MG: 10 TABLET ORAL at 03:07

## 2023-04-28 RX ADMIN — TRAZODONE HYDROCHLORIDE 50 MG: 50 TABLET ORAL at 20:36

## 2023-04-28 ASSESSMENT — GAIT ASSESSMENTS
GAIT LEVEL OF ASSIST: SUPERVISED
GAIT LEVEL OF ASSIST: SUPERVISED
ASSISTIVE DEVICE: FRONT WHEEL WALKER
DISTANCE (FEET): 110
ASSISTIVE DEVICE: FRONT WHEEL WALKER
DISTANCE (FEET): 150

## 2023-04-28 ASSESSMENT — ACTIVITIES OF DAILY LIVING (ADL)
BED_CHAIR_WHEELCHAIR_TRANSFER_DESCRIPTION: ADAPTIVE EQUIPMENT
TOILET_TRANSFER_LEVEL_OF_ASSIST: ABLE TO COMPLETE TOILET TRANSFER WITHOUT ASSIST
TUB_SHOWER_TRANSFER_DESCRIPTION: ADAPTIVE EQUIPMENT
BED_CHAIR_WHEELCHAIR_TRANSFER_DESCRIPTION: ADAPTIVE EQUIPMENT
TOILETING_LEVEL_OF_ASSIST: ABLE TO COMPLETE TOILETING WITHOUT ASSIST
BED_CHAIR_WHEELCHAIR_TRANSFER_DESCRIPTION: ADAPTIVE EQUIPMENT
TOILET_TRANSFER_DESCRIPTION: ADAPTIVE EQUIPMENT
BED_CHAIR_WHEELCHAIR_TRANSFER_DESCRIPTION: ADAPTIVE EQUIPMENT
SHOWER_TRANSFER_LEVEL_OF_ASSIST: ABLE TO COMPLETE SHOWER TRANSFER WITHOUT ASSIST

## 2023-04-28 ASSESSMENT — BRIEF INTERVIEW FOR MENTAL STATUS (BIMS)
ASKED TO RECALL SOCK: YES, NO CUE REQUIRED
ASKED TO RECALL BED: YES, NO CUE REQUIRED
WHAT YEAR IS IT: CORRECT
WHAT DAY OF THE WEEK IS IT: CORRECT
ASKED TO RECALL BLUE: YES, NO CUE REQUIRED
WHAT MONTH IS IT: ACCURATE WITHIN 5 DAYS
BIMS SUMMARY SCORE: 15
INITIAL REPETITION OF BED BLUE SOCK - FIRST ATTEMPT: 3

## 2023-04-28 ASSESSMENT — PAIN DESCRIPTION - PAIN TYPE
TYPE: ACUTE PAIN

## 2023-04-28 NOTE — THERAPY
Physical Therapy   Daily Treatment     Patient Name: Lorena No  Age:  73 y.o., Sex:  female  Medical Record #: [id number]  Today's Date: 4/28/2023     Precautions  Precautions: Fall Risk, Weight Bearing As Tolerated Left Lower Extremity  Comments: LLE antalgia    Subjective    Pt was seen [phone], [phone]. Agreeable to both sessions.     Objective       04/28/23 1031 04/28/23 1301   PT Charge Group   PT Gait Training (Units) 1 1   PT Therapeutic Exercise (Units) 2  --    PT Therapeutic Activities (Units) 1 1   Supervising Physical Therapist Lizabeth English   PT Total Time Spent   PT Individual Total Time Spent (Mins) 60 30   Pain 0 - 10 Group   Location Hip  --    Therapist Pain Assessment Prior to Activity;Nurse Notified  --    Cognition    Level of Consciousness Alert Alert   Gait Functional Level of Assist    Gait Level Of Assist Supervised Supervised   Assistive Device Front Wheel Walker Front Wheel Walker   Distance (Feet) 150 110   # of Times Distance was Traveled 2 1   Deviation Decreased Base Of Support;Antalgic;Decreased Heel Strike;Bradykinetic Decreased Base Of Support;Antalgic;Decreased Heel Strike;Bradykinetic   Stairs Functional Level of Assist   Level of Assist with Stairs  --  Contact Guard Assist  (to SBA)   # of Stairs Climbed  --  12   Stairs Description  --  Extra time;Hand rails;Limited by fatigue;Supervision for safety   Transfer Functional Level of Assist   Bed, Chair, Wheelchair Transfer Modified Independent Modified Independent   Bed Chair Wheelchair Transfer Description Adaptive equipment  (FWW) Adaptive equipment  (FWW)   Sitting Lower Body Exercises   Ankle Pumps 1 set of 15;Bilateral  --    Hip Abduction 1 set of 10;Left  (no resistance)  --    Long Arc Quad 1 set of 15;Left  --    Marching 1 set of 10  (AAROM LLE)  --    Hamstring Curl 1 set of 10;Left  --    Standing Lower Body Exercises   Standing Lower Body Exercises   (delivered standing therex, verbally went over and  advised having SPV when perfoming standing therex d/t pain at LLE WB)  --    Bed Mobility    Sit to Supine Modified Independent Modified Independent   Sit to Stand Modified Independent  (FWW) Modified Independent   Scooting Modified Independent Modified Independent   Interdisciplinary Plan of Care Collaboration   IDT Collaboration with  Physician;  --    Patient Position at End of Therapy In Bed In Bed   Collaboration Comments MD rounds, re: HH and DME  --      AM-  Delivered seated, standing LE HEP; fall packet and demonstrate floor recovery.  Discussed d/c recommendation.    PM-  Gong ringing in standing w/ FWW, SBA.  Ambulating uneven surface 10ftx2 w/ CGA/SBA.  Adjusted DME to height.    Assessment    Pt tolerated both sessions well, she states she's ready for d/c and agreeable on HHPT d/t transportation/home environment. PTA answered all questions for now. Will have FT tomorrow for stairs training w/ dtr and car xfer, outdoor ambulation if time.    Strengths: Able to follow instructions, Alert and oriented, Effective communication skills, Good insight into deficits/needs, Independent prior level of function, Making steady progress towards goals, Motivated for self care and independence, Pleasant and cooperative, Willingly participates in therapeutic activities  Barriers: Decreased endurance, Generalized weakness, Home accessibility, Impaired balance, Limited mobility, Pain, Poor family support    Plan    FT tomorrow - stairs, car xfer, outdoor ambulation.    Passport items to be completed:  Completed    Physical Therapy Problems (Active)       Problem: Balance       Dates: Start: 04/20/23         Goal: STG-Within one week, patient will perform standing home exercise program with handout at SPV level.       Dates: Start: 04/20/23         Goal Note filed on 04/26/23 1700 by Gomez Antoine, PT       CGA at this time depending on LLE usage and balance                 Problem: PT-Long Term Goals        Dates: Start: 04/18/23         Goal: LTG-By discharge, patient will ambulate 150 feet with FWW at IND level indoors & outdoors.       Dates: Start: 04/18/23            Goal: LTG-By discharge, patient will transfer one surface to another with FWW at IND level.       Dates: Start: 04/18/23            Goal: LTG-By discharge, patient will perform home exercise program with handout at IND level.       Dates: Start: 04/18/23            Goal: LTG-By discharge, patient will ambulate up/down 4 stairs with B railings at IND level.       Dates: Start: 04/18/23            Goal: LTG-By discharge, patient will transfer in/out of a car with FWW at IND level.       Dates: Start: 04/18/23

## 2023-04-28 NOTE — DISCHARGE PLANNING
Case management  Reviewed signed copy of IMM and answered all questions.    Dc date /disposition: 4/29/23 with out patient physical therapy.

## 2023-04-28 NOTE — DISCHARGE INSTRUCTIONS
[facility] NURSING DISCHARGE INSTRUCTIONS    Blood Pressure : 114/63  Weight: 57.1 kg (125 lb 14.4 oz)  Nursing recommendations for Lorena No at time of discharge are as follows:  Client verbalized understanding of all discharge instructions and prescriptions.     Review all your home medications and newly ordered medications with your doctor and/or pharmacist. Follow medication instructions as directed by your doctor and/or pharmacist.    Pain Management:   Discharge Pain Medication Instructions:  Comfort Goal: Comfort with Movement, Sleep Comfortably  Notify your primary care provider if pain is unrelieved with these measures, if the pain is new, or increased in intensity.    Discharge Skin Characteristics:    Discharge Skin Exam:    Wound 04/17/23 Incision Hip Lateral Left with staples (Active)   Wound Image   04/17/23 1724   Site Assessment Clean;Dry;Intact 04/28/23 2036   Periwound Assessment Clean;Dry;Intact 04/28/23 2036   Margins Attached edges 04/28/23 2036   Closure Approximated;Staples 04/28/23 0930   Drainage Amount None 04/28/23 0930   Drainage Description Sanguineous 04/19/23 0000   Dressing Options Open to Air 04/28/23 0930   Dressing Changed Observed 04/28/23 0930   Dressing Status Open to Air 04/28/23 0930       Wound 04/17/23 Incision Knee Lateral Left incision with staples (Active)   Wound Image   04/17/23 1724   Site Assessment Clean;Dry;Intact 04/28/23 2036   Periwound Assessment Clean;Dry;Intact 04/28/23 2036   Margins Attached edges 04/28/23 2036   Closure [place];Approximated 04/28/23 0930   Drainage Amount None 04/28/23 0930   Drainage Description Sanguineous 04/18/23 1300   Wound Cleansing Approved Wound Cleanser 04/25/23 2000   Dressing Options Open to Air 04/28/23 0930   Dressing Changed Changed 04/27/23 0730   Dressing Status Open to Air 04/28/23 0930   Dressing Change/Treatment Frequency Daily, and As Needed 04/27/23 0730   NEXT Dressing Change/Treatment  Date 04/28/23 04/25/23 2000   NEXT Weekly Photo (Inpatient Only) 05/01/23 04/25/23 1100     Skin / Wound Care Instructions: Please contact your primary care physician for any change in skin integrity. Left hip (LLE)    If You Have Surgical Incisions / Wounds:  Monitor surgical site(s) for signs of increased swelling, redness or symptoms of drainage from the site or fever as this could indicate signs and symptoms of infection. If these symptoms are noted, notifiy your primary care provider.      Discharge Safety Instructions: Should Not Be Left Alone In The House     Discharge Safety Concerns: History Of Falls  The interdisciplinary team has made recommendation that you should not be left alone  in the house due to history of falls  Anti-embolic stockings are not required to increase circulation to the lower extremities.    Discharge Diet: Soft and bite sized     Discharge Liquids: Thin  Discharge Bowel Function: Continent  Please contact your primary care physician for any changes in bowel habits.  Discharge Bowel Program:    Discharge Bladder Function: Continent  Discharge Urinary Devices:        Nursing Discharge Plan:        Case Management Discharge Instructions:   Discharge Location:    Agency Name/Address/Phone:    Home Health:    Outpatient Services:    DME Provider/Phone:    Medical Equipment Ordered:    Prescription Faxed to:        Discharge Medication Instructions:  Below are the medications your physician expects you to take upon discharge:  Fall Prevention in the Home, Adult  Falls can cause injuries and can affect people from all age groups. There are many simple things that you can do to make your home safe and to help prevent falls. Ask for help when making these changes, if needed.  What actions can I take to prevent falls?  General instructions  Use good lighting in all rooms. Replace any light bulbs that burn out.  Turn on lights if it is dark. Use night-lights.  Place frequently used items in  easy-to-reach places. Lower the shelves around your home if necessary.  Set up furniture so that there are clear paths around it. Avoid moving your furniture around.  Remove throw rugs and other tripping hazards from the floor.  Avoid walking on wet floors.  Fix any uneven floor surfaces.  Add color or contrast paint or tape to grab bars and handrails in your home. Place contrasting color strips on the first and last steps of stairways.  When you use a stepladder, make sure that it is completely opened and that the sides are firmly locked. Have someone hold the ladder while you are using it. Do not climb a closed stepladder.  Be aware of any and all pets.  What can I do in the bathroom?         Keep the floor dry. Immediately clean up any water that spills onto the floor.  Remove soap buildup in the tub or shower on a regular basis.  Use non-skid mats or decals on the floor of the tub or shower.  Attach bath mats securely with double-sided, non-slip rug tape.  If you need to sit down while you are in the shower, use a plastic, non-slip stool.  Install grab bars by the toilet and in the tub and shower. Do not use towel bars as grab bars.  What can I do in the bedroom?  Make sure that a bedside light is easy to reach.  Do not use oversized bedding that drapes onto the floor.  Have a firm chair that has side arms to use for getting dressed.  What can I do in the kitchen?  Clean up any spills right away.  If you need to reach for something above you, use a sturdy step stool that has a grab bar.  Keep electrical cables out of the way.  Do not use floor polish or wax that makes floors slippery. If you must use wax, make sure that it is non-skid floor wax.  What can I do in the stairways?  Do not leave any items on the stairs.  Make sure that you have a light switch at the top of the stairs and the bottom of the stairs. Have them installed if you do not have them.  Make sure that there are handrails on both sides of the  stairs. Fix handrails that are broken or loose. Make sure that handrails are as long as the stairways.  Install non-slip stair treads on all stairs in your home.  Avoid having throw rugs at the top or bottom of stairways, or secure the rugs with carpet tape to prevent them from moving.  Choose a carpet design that does not hide the edge of steps on the stairway.  Check any carpeting to make sure that it is firmly attached to the stairs. Fix any carpet that is loose or worn.  What can I do on the outside of my home?  Use bright outdoor lighting.  Regularly repair the edges of walkways and driveways and fix any cracks.  Remove high doorway thresholds.  Trim any shrubbery on the main path into your home.  Regularly check that handrails are securely fastened and in good repair. Both sides of any steps should have handrails.  Install guardrails along the edges of any raised decks or porches.  Clear walkways of debris and clutter, including tools and rocks.  Have leaves, snow, and ice cleared regularly.  Use sand or salt on walkways during winter months.  In the garage, clean up any spills right away, including grease or oil spills.  What other actions can I take?  Wear closed-toe shoes that fit well and support your feet. Wear shoes that have rubber soles or low heels.  Use mobility aids as needed, such as canes, walkers, scooters, and crutches.  Review your medicines with your health care provider. Some medicines can cause dizziness or changes in blood pressure, which increase your risk of falling.  Talk with your health care provider about other ways that you can decrease your risk of falls. This may include working with a physical therapist or  to improve your strength, balance, and endurance.  Where to find more information  Centers for Disease Control and Prevention, [place]: https://www.cdc.gov  National [place] on Aging: https://kg3mgld.ramón.nih.gov  Contact a health care provider if:  You are afraid of  "falling at home.  You feel weak, drowsy, or dizzy at home.  You fall at home.  Summary  There are many simple things that you can do to make your home safe and to help prevent falls.  Ways to make your home safe include removing tripping hazards and installing grab bars in the bathroom.  Ask for help when making these changes in your home.  This information is not intended to replace advice given to you by your health care provider. Make sure you discuss any questions you have with your health care provider.  Document Released: 12/08/2003 Document Revised: 11/30/2018 Document Reviewed: 08/02/2018  [employer] Patient Education © 2020 [employer]    Prevent Falls in Your Home    \"Falling once doubles your chance of falling again\"        -Center for Disease Control and Prevention    Falls in the home can lead to serious injury (fractures, brain injuries), hospitalizations, increased medical costs, and could even be fatal.  The good news is, there are many precautions you can take to avoid falls in your home and help keep you safe:     If prescribed an assistive device (walker, crutches), use as instructed by the healthcare provider\"   Remove any tripping hazards from your home, including loose cords, throw rugs and clutter  Keep a nightlight on in dark (hallways, bathrooms, etc)   Get up slowly, to make sure you feel okay before getting up  Be aware of any side effects of your medications: some medications may make you dizzy  Place a non-skid rubber mat in your shower or tub-consider a shower bench or chair if unsteady on your feet  Wear supportive shoes or non-skid socks when moving around  Start an exercise program once approved by your provider.  If you are feeling weak following a hospital stay, talk to your doctor about home health or outpatient therapy programs designed to help rebuild your strength and endurance    Depression / Suicide Risk    As you are discharged from this [facility] facility, it is " important to learn how to keep safe from harming yourself.    Recognize the warning signs:  Abrupt changes in personality, positive or negative- including increase in energy   Giving away possessions  Change in eating patterns- significant weight changes-  positive or negative  Change in sleeping patterns- unable to sleep or sleeping all the time   Unwillingness or inability to communicate  Depression  Unusual sadness, discouragement and loneliness  Talk of wanting to die  Neglect of personal appearance   Rebelliousness- reckless behavior  Withdrawal from people/activities they love  Confusion- inability to concentrate     If you or a loved one observes any of these behaviors or has concerns about self-harm, here's what you can do:  Talk about it- your feelings and reasons for harming yourself  Remove any means that you might use to hurt yourself (examples: pills, rope, extension cords, firearm)  Get professional help from the community (Mental Health, Substance Abuse, psychological counseling)  Do not be alone:Call your Safe Contact- someone whom you trust who will be there for you.  Call your local CRISIS HOTLINE 323-4297 or 728-130-4546  Call your local Children's Mobile Crisis Response Team [place] (700) 152-2386 or www.[employer]  Call the toll free National Suicide Prevention Hotlines   National Suicide Prevention Lifeline 108-468-EERH (5564)  National Hope Line Network 800-SUICIDE (305-6620)                        Physical Therapy Discharge Instructions for Lorena MIGUEL No    4/28/2023    Weight Bearing Status - Patient Should: Bear Weight as Tolerated Left Leg  Level of Assist Required for Ambulation: Assist for Balance on Curbs, Assist for Balance on Stairs, No Assist on Flat Surfaces  Distance Patient May Ambulate: household, not safe for community ambulation d/t pain and endurance  Device Recommended for Ambulation: Front-Wheeled Walker  Level of Assist Required for Transfers: Requires No  Assist  Device Recommended for Transfers: Front-Wheeled Walker  Home Exercise Program: Refer to Home Exercise Program Handout for Details  It was great working with you! - Brittany DIAZ

## 2023-04-28 NOTE — CARE PLAN
Problem: OT Long Term Goals  Goal: LTG-By discharge, patient will complete basic self care tasks with mod I overall using AE/DME as needed.  Outcome: Met  Goal: LTG-By discharge, patient will perform bathroom transfers with mod I overall with AE/DME as needed.  Outcome: Met  Goal: LTG-By discharge, patient will complete basic home management with supervision-mod I overall using AE/DME as needed.  Outcome: Met

## 2023-04-28 NOTE — DISCHARGE SUMMARY
Physical Medicine & Rehabilitation Discharge Summary    Admission Date: 4/17/2023    Discharge Date: 4/29/2023     Attending Provider: Carlos Lobato DO for Shaan Burton MD/PhD    Admission Diagnosis:   Active Hospital Problems    Diagnosis     *Hip fracture requiring operative repair, left, closed, initial encounter (HCC)     Acute blood loss anemia     Traumatic rhabdomyolysis (HCC)        Discharge Diagnosis:  Active Hospital Problems    Diagnosis     *Hip fracture requiring operative repair, left, closed, initial encounter (HCC)     Acute blood loss anemia     Traumatic rhabdomyolysis ([facility])        HPI per Admission History & Physical:  Patient is a 73 y.o. with a PMH of HTN who presented to ED on 4/14/23 after fall at home. Patient reportedly fell the night prior and had worsening hip pain. She was found in ED to have left hip fracture. Patient underwent IMN treatment on 4/15/23 with Dr. Isidro for the left hip fracture. Post-operative course complicated by severe anemia, leukocytosis, and variable blood pressure.     Patient was admitted to [facility] on 4/17/2023.     Hospital Course by Problem List:  Left hip fracture - Patient with fall on 4/13/23 with severe hip pain found to have fracture s/p IMN with Dr. Isidro on 4/14/23. Patient is WBAT.  -PT and OT for mobility and ADLs. Per guidelines, 15 hours per week between PT, OT and/or SLP.  -Follow-up Dr. Isidro.      Anemia - Check AM CBC - 7.0. Severe worsening anemia puts patient at high risk for further decline including respiratory failure and shock. Consult hospitalist  -Recheck CBC, Fe panel, FOBT. Low Fe, ongoing severe anemia. Repeat 4/21 - 7.5. Will recheck on Fe supplement. Improved to 8.4     Tachycardia - New on 4/26/23, reportedly had an issue in the past but does not know diagnosis. Will check EKG, AM labs including BNP and Magnesium  -EKG normal sinus rhythm. BNP normal. Mag and BMP normal.      Leukocytosis -  Check AM CBC. Most likely reactionary - improved. Resolved  - 11.2 on 4/27, UA sent, CXR negative.  UA negative. Repeat 10, follow-up PCP     Azotemia - Check AM CMP - resolved     Hypocalcemia - Check AM CMP - 9.1, improved.      Hypophosphatemia - Check AM Mag/Phosph - improved. Not on supplements     Rhabdomyolysis - Check AM CPK - 728 trending down.     Pain - APAP/Oxycodone PRN and Celebrex BID. Discontinue scheduled Celebrex, PRN now available.     Muscle spasms - Patient having severe muscle spasms putting her at risk for increased falls and injury due to hip muscles buckling and given her recent hip fracture she has less ability to protect herself. Will start Tizanidine. Tizanidine is a high risk medication which must have LFTs monitored regularly. Will check AM CMP - Cr and LFTs stable. Continue Tizanidine.    I discussed the risks and benefits of using opiate medications for pain control.  I discussed the risk of addiction, potential for overdose, and respiratory depression (and the potential need for opiate antagonist therapy if this occurs).  I encouraged the patient to take this medication sparingly with the expressed goal of weaning off the medication as soon as is clinically appropriate.  I informed the patient that we are only able to provide up to a 14 day supply of these medications at discharge and that they will be responsible for requesting any refills needed from their primary care provider or their surgeon.  We discussed the need to safely secure these medications to prevent theft, inadvertent ingestion, or misuse.  Any unused medication should be immediately disposed of through a sanctioned medication disposal program.  We discussed adjunctive pain medications and conservative therapies at length.I answered the patient's questions regarding this treatment, and the patient indicated understanding and willingness to proceed.     Skin - Patient at risk for skin breakdown due to debility in areas  including sacrum, achilles, elbows and head in addition to other sites. Nursing to assess skin daily.      GI Ppx - Patient on Prilosec for GERD prophylaxis. Patient on Senna-docusate for constipation prophylaxis.      DVT Ppx - Patient Lovenox on transfer.    Functional Status at Discharge  Eating:  Independent  Eating Description:     Grooming:  Modified Independent  Grooming Description:  Standing at sink  Bathing:  Modified Independent  Bathing Description:  Increased time, Tub bench  Upper Body Dressing:  Independent  Upper Body Dressing Description:     Lower Body Dressing:  Modified Independent  Lower Body Dressing Description:  Reacher, Sock aid  Discharge Location : Home  Patient Discharging with Assist of: No one, Patient will be Alone (Intermittent assistance from Adult Children for community mobility and higher level IADLs)  Level of Supervision Required: No Supervision  Recommended Equipment for Discharge: Front-Wheeled Walker;Shower Chair  Recommended Services Upon Discharge: No Follow-Up Occupational Therapy Recommended  Long Term Goals Met: 3  Long Term Goals Not Met: 0  Criteria for Termination of Services: Maximum Function Achieved for Inpatient Rehabilitation  Walk:  Supervised  Distance Walked:  150  Number of Times Distance Was Traveled:  2  Assistive Device:  Front Wheel Walker  Gait Deviation:  Decreased Base Of Support, Antalgic, Decreased Heel Strike, Bradykinetic  Wheelchair:   (n/a)  Distance Propelled:   (n/a)   Wheelchair Description:   (pt still using BUE to assist LLE to/from w/c legrest)  Stairs Contact Guard Assist  Stairs Description Extra time, Hand rails, Limited by fatigue, Supervision for safety, Verbal cueing (B HR)  Discharge Location: Home  Patient Discharging with Assist of: No One, Patient will be Alone  Level of Supervision Required Upon Discharge: Intermittent Supervision  Recommended Equipment for Discharge: Front-Wheeled Walker  Recommeded Services Upon Discharge: Home  Health Physical Therapy  Long Term Goals Met: 2  Long Term Goals Not Met: 3  Reason(s) for Goals Not Met: pt required CGA during stairs, SPV at household distance ambulation  Criteria for Termination of Services: Maximum Function Achieved for Inpatient Rehabilitation  Comprehension:  Modified Independent  Comprehension Description:     Expression:  Modified Independent  Expression Description:     Social Interaction:     Social Interaction Description:     Problem Solving:  Modified Independent  Problem Solving Description:     Memory:  Modified Independent  Memory Description:          Shaan CORDON M.D., personally performed a complete drug regimen review and no potential clinically significant medication issues were identified.   Discharge Medication:     Medication List        START taking these medications        Instructions   ferrous sulfate 325 (65 Fe) MG tablet   Take 1 Tablet by mouth 2 times a day with meals.  Dose: 325 mg     oxyCODONE immediate release 10 MG immediate release tablet  Commonly known as: ROXICODONE   Take 1 Tablet by mouth every 6 hours as needed for Severe Pain or Moderate Pain for up to 7 days.  Dose: 10 mg     traZODone 50 MG Tabs  Commonly known as: DESYREL   Take 1 Tablet by mouth at bedtime as needed for Sleep.  Dose: 50 mg            CHANGE how you take these medications        Instructions   multivitamin Tabs  What changed: Another medication with the same name was removed. Continue taking this medication, and follow the directions you see here.   Take 1 Tablet by mouth every day.  Dose: 1 Tablet            CONTINUE taking these medications        Instructions   acetaminophen 325 MG Tabs  Commonly known as: Tylenol   Take 2 Tablets by mouth every 6 hours.  Dose: 650 mg     CALCIUM PO   Take 1 Dose by mouth every day.  Dose: 1 Dose     folic acid 1 MG Tabs  Commonly known as: FOLVITE   Take 1 Tablet by mouth every day.  Dose: 1 mg            STOP taking these  medications      enoxaparin 40 MG/0.4ML Sosy inj  Commonly known as: Lovenox     phosphorus 250 MG tablet  Commonly known as: K-Phos-Neutral     polyethylene glycol/lytes 17 g Pack  Commonly known as: MIRALAX     senna-docusate 8.6-50 MG Tabs  Commonly known as: PERICOLACE or SENOKOT S     thiamine 100 MG tablet  Commonly known as: THIAMINE              Discharge Diet:  Current Diet Order   Procedures    Diet Order Diet: Level 6 - Soft and Bite Sized; Liquid level: Level 0 - Thin       Discharge Activity:  As tolerated     Disposition:  Patient to discharge home with family support and community resources.    Equipment:  FWW    Follow-up & Discharge Instructions:  Follow up with your primary care provider (PCP) within 7-10 days of discharge to review your medications and take over your care.     If you develop chest pain, fever, chills, change in neurologic function (weakness, sensation changes, vision changes), or other concerning sxs, seek immediate medical attention or call 911.      No future appointments.    Condition on Discharge:  Good    More than 35 minutes was spent on discharging this patient, including face-to-face time, prescription management, and the dictation of this note.  Nikolai Lobato D.O.      Date of Service: 4/29/2023

## 2023-04-28 NOTE — DISCHARGE PLANNING
[facility]    Home health sent to [facility].     [facility]me [facility] to [facility]. [facility] denied due to patients residence. Now sent to [facility]. [facility] will deliver equipment by this afternoon.

## 2023-04-28 NOTE — PROGRESS NOTES
"  Physical Medicine & Rehabilitation Progress Note    Encounter Date: 2023    Chief Complaint: Decreased mobility, hip pain    Interval Events (Subjective):  Patient sitting up in room. She reports therapy is going well. Reviewed AM labs and improved WBC. Ongoing anemia is slowly improving. Denies NVD. Denies SOB.     _____________________________________  Interdisciplinary Team Conference   Most recent IDT on 2023     Discharge Date/Disposition:  23  _____________________________________      Objective:  VITAL SIGNS: BP (!) 144/76   Pulse 88   Temp 37.9 °C (100.2 °F) (Oral)   Resp 16   Ht 1.575 m (5' 2\")   Wt 57.1 kg (125 lb 14.4 oz)   SpO2 94%   BMI 23.03 kg/m²   Gen: NAD  Psych: Mood and affect appropriate  CV: RRR, 0 edema  Resp: CTAB, no upper airway sounds  Abd: NTND  Neuro: AOx4, following commands  Unchanged from 23    Laboratory Values:  Recent Results (from the past 72 hour(s))   EKG (IP)    Collection Time: 23  2:42 PM   Result Value Ref Range    Report       [facility]    Test Date:  2023  Pt Name:    OLIMPIA ALAS                 Department: [facility]  MRN:        [id number]                      Room:       [facility]  Gender:     Female                       Technician: [id number] WT  :        1950                   Requested By:EDILSON MASCORRO  Order #:    [id number]                    Reading MD: Dano Browne MD    Measurements  Intervals                                Axis  Rate:       99                           P:          71  CO:         168                          QRS:        59  QRSD:       85                           T:          37  QT:         340  QTc:        437    Interpretive Statements  Sinus rhythm  Nonspecific ST wave changes  Electronically Signed On 2023 18:16:52 PDT by Dano Browne MD     CBC WITH DIFFERENTIAL    Collection Time: 23  6:25 AM   Result Value Ref Range    WBC 11.1 (H) 4.8 - 10.8 K/uL    RBC 3.06 (L) 4.20 - " 5.40 M/uL    Hemoglobin 9.3 (L) 12.0 - 16.0 g/dL    Hematocrit 29.5 (L) 37.0 - 47.0 %    MCV 96.4 81.4 - 97.8 fL    MCH 30.4 27.0 - 33.0 pg    MCHC 31.5 (L) 33.6 - 35.0 g/dL    RDW 53.1 (H) 35.9 - 50.0 fL    Platelet Count 633 (H) 164 - 446 K/uL    MPV 9.5 9.0 - 12.9 fL    Neutrophils-Polys 74.90 (H) 44.00 - 72.00 %    Lymphocytes 13.80 (L) 22.00 - 41.00 %    Monocytes 8.80 0.00 - 13.40 %    Eosinophils 0.90 0.00 - 6.90 %    Basophils 0.80 0.00 - 1.80 %    Immature Granulocytes 0.80 0.00 - 0.90 %    Nucleated RBC 0.00 /100 WBC    Neutrophils (Absolute) 8.32 (H) 2.00 - 7.15 K/uL    Lymphs (Absolute) 1.54 1.00 - 4.80 K/uL    Monos (Absolute) 0.98 (H) 0.00 - 0.85 K/uL    Eos (Absolute) 0.10 0.00 - 0.51 K/uL    Baso (Absolute) 0.09 0.00 - 0.12 K/uL    Immature Granulocytes (abs) 0.09 0.00 - 0.11 K/uL    NRBC (Absolute) 0.00 K/uL   Basic Metabolic Panel    Collection Time: 04/27/23  6:25 AM   Result Value Ref Range    Sodium 136 135 - 145 mmol/L    Potassium 4.1 3.6 - 5.5 mmol/L    Chloride 102 96 - 112 mmol/L    Co2 24 20 - 33 mmol/L    Glucose 96 65 - 99 mg/dL    Bun 13 8 - 22 mg/dL    Creatinine 0.55 0.50 - 1.40 mg/dL    Calcium 9.1 8.5 - 10.5 mg/dL    Anion Gap 10.0 7.0 - 16.0   proBrain Natriuretic Peptide, NT    Collection Time: 04/27/23  6:25 AM   Result Value Ref Range    NT-proBNP 576 (H) 0 - 125 pg/mL   MAGNESIUM    Collection Time: 04/27/23  6:25 AM   Result Value Ref Range    Magnesium 1.9 1.5 - 2.5 mg/dL   ESTIMATED GFR    Collection Time: 04/27/23  6:25 AM   Result Value Ref Range    GFR (CKD-EPI) 98 >60 mL/min/1.73 m 2   URINALYSIS    Collection Time: 04/27/23 12:20 PM    Specimen: Urine, Clean Catch   Result Value Ref Range    Color Yellow     Character Clear     Specific Gravity 1.021 <1.035    Ph 6.5 5.0 - 8.0    Glucose Negative Negative mg/dL    Ketones Trace (A) Negative mg/dL    Protein Negative Negative mg/dL    Bilirubin Negative Negative    Urobilinogen, Urine 0.2 Negative    Nitrite Negative  Negative    Leukocyte Esterase Negative Negative    Occult Blood Negative Negative    Micro Urine Req see below    CBC WITH DIFFERENTIAL    Collection Time: 04/28/23  6:04 AM   Result Value Ref Range    WBC 10.0 4.8 - 10.8 K/uL    RBC 2.98 (L) 4.20 - 5.40 M/uL    Hemoglobin 9.0 (L) 12.0 - 16.0 g/dL    Hematocrit 28.6 (L) 37.0 - 47.0 %    MCV 96.0 81.4 - 97.8 fL    MCH 30.2 27.0 - 33.0 pg    MCHC 31.5 (L) 33.6 - 35.0 g/dL    RDW 51.1 (H) 35.9 - 50.0 fL    Platelet Count 601 (H) 164 - 446 K/uL    MPV 9.5 9.0 - 12.9 fL    Neutrophils-Polys 74.50 (H) 44.00 - 72.00 %    Lymphocytes 15.00 (L) 22.00 - 41.00 %    Monocytes 8.20 0.00 - 13.40 %    Eosinophils 1.00 0.00 - 6.90 %    Basophils 0.80 0.00 - 1.80 %    Immature Granulocytes 0.50 0.00 - 0.90 %    Nucleated RBC 0.00 /100 WBC    Neutrophils (Absolute) 7.41 (H) 2.00 - 7.15 K/uL    Lymphs (Absolute) 1.49 1.00 - 4.80 K/uL    Monos (Absolute) 0.82 0.00 - 0.85 K/uL    Eos (Absolute) 0.10 0.00 - 0.51 K/uL    Baso (Absolute) 0.08 0.00 - 0.12 K/uL    Immature Granulocytes (abs) 0.05 0.00 - 0.11 K/uL    NRBC (Absolute) 0.00 K/uL   Comp Metabolic Panel    Collection Time: 04/28/23  6:04 AM   Result Value Ref Range    Sodium 135 135 - 145 mmol/L    Potassium 3.9 3.6 - 5.5 mmol/L    Chloride 103 96 - 112 mmol/L    Co2 22 20 - 33 mmol/L    Anion Gap 10.0 7.0 - 16.0    Glucose 98 65 - 99 mg/dL    Bun 14 8 - 22 mg/dL    Creatinine 0.56 0.50 - 1.40 mg/dL    Calcium 8.9 8.5 - 10.5 mg/dL    AST(SGOT) 12 12 - 45 U/L    ALT(SGPT) 14 2 - 50 U/L    Alkaline Phosphatase 117 (H) 30 - 99 U/L    Total Bilirubin 0.7 0.1 - 1.5 mg/dL    Albumin 3.5 3.2 - 4.9 g/dL    Total Protein 6.6 6.0 - 8.2 g/dL    Globulin 3.1 1.9 - 3.5 g/dL    A-G Ratio 1.1 g/dL   PHOSPHORUS    Collection Time: 04/28/23  6:04 AM   Result Value Ref Range    Phosphorus 4.1 2.5 - 4.5 mg/dL   CORRECTED CALCIUM    Collection Time: 04/28/23  6:04 AM   Result Value Ref Range    Correct Calcium 9.3 8.5 - 10.5 mg/dL   ESTIMATED GFR     Collection Time: 04/28/23  6:04 AM   Result Value Ref Range    GFR (CKD-EPI) 97 >60 mL/min/1.73 m 2         Medications:  Scheduled Medications   Medication Dose Frequency    ferrous sulfate  325 mg BID WITH MEALS    Pharmacy Consult Request  1 Each PHARMACY TO DOSE    senna-docusate  2 Tablet BID    omeprazole  20 mg DAILY    enoxaparin (LOVENOX) injection  40 mg DAILY AT 1800    lidocaine  1 Patch Q24HR     PRN medications: tizanidine, Respiratory Therapy Consult, hydrALAZINE, senna-docusate **AND** polyethylene glycol/lytes **AND** magnesium hydroxide **AND** bisacodyl, mag hydrox-al hydrox-simeth, ondansetron **OR** ondansetron, traZODone, sodium chloride, oxyCODONE immediate-release **OR** oxyCODONE immediate-release, [COMPLETED] celecoxib **FOLLOWED BY** celecoxib, acetaminophen    Diet:  Current Diet Order   Procedures    Diet Order Diet: Level 6 - Soft and Bite Sized; Liquid level: Level 0 - Thin       Medical Decision Making and Plan:  Left hip fracture - Patient with fall on 4/13/23 with severe hip pain found to have fracture s/p IMN with Dr. Isidro on 4/14/23. Patient is WBAT.  -PT and OT for mobility and ADLs. Per guidelines, 15 hours per week between PT, OT and/or SLP.  -Follow-up Dr. Isidro.      Anemia - Check AM CBC - 7.0. Severe worsening anemia puts patient at high risk for further decline including respiratory failure and shock. Consult hospitalist  -Recheck CBC, Fe panel, FOBT. Low Fe, ongoing severe anemia. Repeat 4/21 - 7.5. Will recheck on Fe supplement. Improved to 8.4     Tachycardia - New on 4/26/23, reportedly had an issue in the past but does not know diagnosis. Will check EKG, AM labs including BNP and Magnesium  -EKG normal sinus rhythm. BNP normal. Mag and BMP normal.     Leukocytosis - Check AM CBC. Most likely reactionary - improved. Resolved  - 11.2 on 4/27, UA sent, CXR negative.  UA negative. Repeat 10, follow-up PCP    Azotemia - Check AM CMP - resolved     Hypocalcemia - Check  AM CMP - 9.1, improved.      Hypophosphatemia - Check AM Mag/Phosph - improved. Not on supplements     Rhabdomyolysis - Check AM CPK - 728 trending down.     Pain - APAP/Oxycodone PRN and Celebrex BID. Discontinue scheduled Celebrex, PRN now available.     Muscle spasms - Patient having severe muscle spasms putting her at risk for increased falls and injury due to hip muscles buckling and given her recent hip fracture she has less ability to protect herself. Will start Tizanidine. Tizanidine is a high risk medication which must have LFTs monitored regularly. Will check AM CMP - Cr and LFTs stable. Continue Tizanidine.      Skin - Patient at risk for skin breakdown due to debility in areas including sacrum, achilles, elbows and head in addition to other sites. Nursing to assess skin daily.      GI Ppx - Patient on Prilosec for GERD prophylaxis. Patient on Senna-docusate for constipation prophylaxis.      DVT Ppx - Patient Lovenox on transfer.  ____________________________________    T. Rolando Burton MD/PhD  [facility] - Physical Medicine & Rehabilitation   [facility] - Brain Injury Medicine   ____________________________________    Total time: 50  minutes. Time spent included pre-rounding review of vitals and tests, unit/floor time, face-to-face time with the patient including physical examination, care coordination, counseling of patient and/or family, ordering medications/procedures/tests, discussion with CM, PT, OT, SLP and/or other healthcare providers, and documentation in the electronic medical record. Topics discussed included discharge planning, discharge medications, muscle spasms and continue Tizanidine.

## 2023-04-28 NOTE — THERAPY
"Occupational Therapy  Daily Treatment     Patient Name: Lorena No  Age:  73 y.o., Sex:  female  Medical Record #: [id number]  Today's Date: 4/28/2023     Precautions  Precautions: Fall Risk, Weight Bearing As Tolerated Left Lower Extremity  Comments: LLE antalgia         Subjective    \"I don't know why I buy these lacy panties. I'm 73 years old, why do I care?\"      Objective       04/28/23 0831   OT Charge Group   Charges Yes   OT Self Care / ADL (Units) 4   OT Total Time Spent   OT Individual Total Time Spent (Mins) 60   Precautions   Precautions Fall Risk;Weight Bearing As Tolerated Left Lower Extremity   Comments LLE antalgia   Cognition    Cognition / Consciousness WDL   Level of Consciousness Alert   Sleep/Wake Cycle   Sleep & Rest Awake   Functional Level of Assist   Eating Independent   Grooming Modified Independent   Grooming Description Standing at sink   Bathing Modified Independent   Bathing Description Increased time;Tub bench   Upper Body Dressing Independent   Lower Body Dressing Modified Independent   Lower Body Dressing Description Reacher;Sock aid   Toileting Modified Independent   Bed, Chair, Wheelchair Transfer Modified Independent   Bed Chair Wheelchair Transfer Description Adaptive equipment   Toilet Transfers Modified Independent   Toilet Transfer Description Adaptive equipment   Tub / Shower Transfers Modified Independent   Tub Shower Transfer Description Adaptive equipment   Balance   Sitting Balance (Static) Fair +   Sitting Balance (Dynamic) Fair +   Standing Balance (Static) Fair   Standing Balance (Dynamic) Fair   Weight Shift Sitting Fair   Weight Shift Standing Fair   Skilled Intervention Compensatory Strategies   Interdisciplinary Plan of Care Collaboration   IDT Collaboration with  Nursing   Patient Position at End of Therapy Seated   Eating   Assistance Needed Independent   CARE Score - Eating 6   Oral Hygiene   Assistance Needed Independent   CARE Score - Oral Hygiene 6 " "  Toileting Hygiene   Assistance Needed Independent   CARE Score - Toileting Hygiene 6   Shower/Bathe Self   Assistance Needed Independent   CARE Score - Shower/Bathe Self 6   Upper Body Dressing   Assistance Needed Independent   CARE Score - Upper Body Dressing 6   Lower Body Dressing   Assistance Needed Independent   CARE Score - Lower Body Dressing 6   Putting On/Taking Off Footwear   Assistance Needed Independent   CARE Score - Putting On/Taking Off Footwear 6   Toilet Transfer   Assistance Needed Independent   CARE Score - Toilet Transfer 6   Cognitive Pattern Assessment   Cognitive Pattern Assessment Used BIMS   Brief Interview for Mental Status (BIMS)   Repetition of Three Words (First Attempt) 3   Temporal Orientation: Year Correct   Temporal Orientation: Month Accurate within 5 days   Temporal Orientation: Day Correct   Recall: \"Sock\" Yes, no cue required   Recall: \"Blue\" Yes, no cue required   Recall: \"Bed\" Yes, no cue required   BIMS Summary Score 15   Confusion Assessment Method (CAM)   Is there evidence of an acute change in mental status from the patient's baseline? No   Inattention Behavior not present   Disorganized thinking Behavior not present   Altered level of consciousness Behavior not present   Discharge Summary    Discharge Location  Home   Patient Discharging with Assist of No one, Patient will be Alone  (Intermittent assistance from Adult Children for community mobility and higher level IADLs)   Level of Supervision Required No Supervision   Recommended Equipment for Discharge Front-Wheeled Walker;Shower Chair   Recommended Services Upon Discharge No Follow-Up Occupational Therapy Recommended   Long Term Goals Met 3   Long Term Goals Not Met 0   Criteria for Termination of Services Maximum Function Achieved for Inpatient Rehabilitation   Discharge Instructions to Patient   Level of Assist Required for Eating Able to Complete Eating without Assist   Level of Assist Required for Grooming Able " to Complete Grooming without Assist   Level of Assist Required for Dressing Able to Complete Dressing without Assist   Equipment for Dressing Sock Aid;Reacher   Level of Assist Required for Toileting Able to Complete Toileting without Assist   Level of Assist Required for Toilet Transfer Able to Complete Toilet Transfer without Assist   Level of Assist Required for Bathing Able to Complete Bathing without Assist   Equipment for Bathing Shower Chair   Level of Assist Required for Shower Transfer Able to Complete Shower Transfer without Assist   Equipment for Shower Transfer Shower Chair   Level of Assist Required for Home Mgmt Requires Physical Assist with Home Management  (Intermittent assist with higher level home management (yard work) anticipated)   Level of Assist Required for Meal Prep Able to Complete Meal Preparation without Assist   Driving Cleared to Drive   Home Exercise Program Refer to Home Exercise Program Handout for Details       Assessment    Patient completed ADL routine with use of AE/AD/DME at a Mod I level with good insight into modified techniques and good overall safety awareness.  Patient's daughters and son will be available to provide intermittent assistance with community mobility and higher level IADLs as needed.      Strengths: Able to follow instructions, Alert and oriented, Good insight into deficits/needs, Independent prior level of function, Making steady progress towards goals, Manages pain appropriately, Motivated for self care and independence, Pleasant and cooperative, Supportive family, Willingly participates in therapeutic activities    Plan    D/C home tomorrow.        Occupational Therapy Goals (Active)       Problem: Dressing       Dates: Start: 04/18/23         Goal: STG-Within one week, patient will dress LB with CGA overall with AE/DME as needed.       Dates: Start: 04/18/23

## 2023-04-28 NOTE — THERAPY
Occupational Therapy  Daily Treatment     Patient Name: Lorena No  Age:  73 y.o., Sex:  female  Medical Record #: [id number]  Today's Date: 4/28/2023     Precautions  Precautions: (P) Fall Risk, Weight Bearing As Tolerated Left Lower Extremity  Comments: LLE antalgia         Subjective    Patient was resting in bed awake and agreeable to OT.  She stated she feels ready to d/c home tomorrow and could not think of anything she needed to practice more.  When asked if she had practiced a dry stall shower transfer yet, she stated she had not, but was willing to.     Objective       04/28/23 1231   OT Charge Group   OT Therapy Activity (Units) 2   OT Total Time Spent   OT Individual Total Time Spent (Mins) 30   Precautions   Precautions Fall Risk;Weight Bearing As Tolerated Left Lower Extremity   Pain 0 - 10 Group   Location Hip   Location Orientation Left   Therapist Pain Assessment During Activity   Functional Level of Assist   Bed, Chair, Wheelchair Transfer Modified Independent   Bed Chair Wheelchair Transfer Description Adaptive equipment  (fww)   Bed Mobility    Supine to Sit Modified Independent   Scooting Modified Independent   Interdisciplinary Plan of Care Collaboration   IDT Collaboration with  ;Physical Therapist Assistant (PTA)   Patient Position at End of Therapy Seated  (in gym awaiting PT)   Collaboration Comments informed PTA that patient was waiting in gym for her after OT; asked CM through [employer] if patient is eligible for Meds to Beds as she would like to do that if possible     Dry stall shower transfer with SBA using FWW stepping in backwards and stepping out forwards using FWW for support.      Functional mobility with FWW in room, hallway and gym with supervision.      Patient asked if medications can be delivered to the hospital before she leaves or if she is given prescriptions that she needs to fill after d/c.    Assessment    Patient is ready to d/c home tomorrow.    Strengths:  Able to follow instructions, Alert and oriented, Good insight into deficits/needs, Independent prior level of function, Making steady progress towards goals, Manages pain appropriately, Motivated for self care and independence, Pleasant and cooperative, Supportive family, Willingly participates in therapeutic activities    Plan    D/C home tomorrow    Occupational Therapy Goals (Active)       Problem: Dressing       Dates: Start: 04/18/23         Goal: STG-Within one week, patient will dress LB with CGA overall with AE/DME as needed.       Dates: Start: 04/18/23

## 2023-04-28 NOTE — CARE PLAN
Problem: Mobility  Goal: Patient's capacity to carry out activities will improve  Outcome: Progressing  Note: Patient able to perform regular activities this shift.  Pain controlled this shift.  Pain management includes PRN pain meds as well as non-pharmacological measures such as emotional support, rest, and repositioning.  Will continue to monitor.    The patient is Stable - Low risk of patient condition declining or worsening    Shift Goals  Clinical Goals: Pain management  Patient Goals: Pain management    Progress made toward(s) clinical / shift goals:  pt mod I in room at wheelchair level, transferring without problems    Patient is not progressing towards the following goals:

## 2023-04-29 ENCOUNTER — APPOINTMENT (OUTPATIENT)
Dept: PHYSICAL THERAPY | Facility: REHABILITATION | Age: 73
DRG: 560 | End: 2023-04-29
Attending: PHYSICAL MEDICINE & REHABILITATION
Payer: MEDICARE

## 2023-04-29 VITALS
RESPIRATION RATE: 17 BRPM | HEART RATE: 81 BPM | HEIGHT: 62 IN | SYSTOLIC BLOOD PRESSURE: 128 MMHG | WEIGHT: 125.9 LBS | BODY MASS INDEX: 23.17 KG/M2 | OXYGEN SATURATION: 95 % | DIASTOLIC BLOOD PRESSURE: 76 MMHG | TEMPERATURE: 98.4 F

## 2023-04-29 PROCEDURE — 97530 THERAPEUTIC ACTIVITIES: CPT

## 2023-04-29 PROCEDURE — A9270 NON-COVERED ITEM OR SERVICE: HCPCS | Performed by: PHYSICAL MEDICINE & REHABILITATION

## 2023-04-29 PROCEDURE — A9270 NON-COVERED ITEM OR SERVICE: HCPCS | Performed by: HOSPITALIST

## 2023-04-29 PROCEDURE — 700102 HCHG RX REV CODE 250 W/ 637 OVERRIDE(OP): Performed by: HOSPITALIST

## 2023-04-29 PROCEDURE — 700102 HCHG RX REV CODE 250 W/ 637 OVERRIDE(OP): Performed by: PHYSICAL MEDICINE & REHABILITATION

## 2023-04-29 PROCEDURE — 99239 HOSP IP/OBS DSCHRG MGMT >30: CPT | Performed by: PHYSICAL MEDICINE & REHABILITATION

## 2023-04-29 RX ADMIN — OXYCODONE HYDROCHLORIDE 10 MG: 10 TABLET ORAL at 01:12

## 2023-04-29 RX ADMIN — OXYCODONE HYDROCHLORIDE 10 MG: 10 TABLET ORAL at 12:00

## 2023-04-29 RX ADMIN — FERROUS SULFATE TAB 325 MG (65 MG ELEMENTAL FE) 325 MG: 325 (65 FE) TAB at 08:53

## 2023-04-29 RX ADMIN — SENNOSIDES AND DOCUSATE SODIUM 2 TABLET: 50; 8.6 TABLET ORAL at 08:53

## 2023-04-29 RX ADMIN — OMEPRAZOLE 20 MG: 20 CAPSULE, DELAYED RELEASE ORAL at 08:53

## 2023-04-29 ASSESSMENT — GAIT ASSESSMENTS
DISTANCE (FEET): 130
GAIT LEVEL OF ASSIST: SUPERVISED
ASSISTIVE DEVICE: FRONT WHEEL WALKER
DEVIATION: ANTALGIC;DECREASED BASE OF SUPPORT;BRADYKINETIC

## 2023-04-29 ASSESSMENT — PAIN DESCRIPTION - PAIN TYPE: TYPE: ACUTE PAIN

## 2023-04-29 ASSESSMENT — PATIENT HEALTH QUESTIONNAIRE - PHQ9
2. FEELING DOWN, DEPRESSED, IRRITABLE, OR HOPELESS: NOT AT ALL
SUM OF ALL RESPONSES TO PHQ9 QUESTIONS 1 AND 2: 0

## 2023-04-29 ASSESSMENT — ACTIVITIES OF DAILY LIVING (ADL): BED_CHAIR_WHEELCHAIR_TRANSFER_DESCRIPTION: ADAPTIVE EQUIPMENT;INCREASED TIME

## 2023-04-29 NOTE — PROGRESS NOTES
Patient discharged to home per order.  Reviewed all discharge instructions, appointments and discharge medications with patient and her daughter.They verbalize understanding.  Discharge paperwork completed, signed copies in chart. Patient alert, calm, stable: no change in status from morning assessment.  Patient left facitlity at 1400 accompanied by family and rehab staff.  Have enjoyed working with this pleasant patient.

## 2023-04-29 NOTE — PROGRESS NOTES
For discharge home today approximately in the afternoon. Left hip fracture, from GLF, Traumatic Rhabdomyolysis, Acute blood loss anemia. Left hip x2 incision sites and left lateral knee incision. Patient able to make needs known. Continent of bladder and bowel. Mod I in room with FWW. Soft and bite sized diet, thin liquids. Fall precautions and pain management.

## 2023-04-29 NOTE — CARE PLAN
"  Problem: Fall Risk - Rehab  Goal: Patient will remain free from falls  Note: Michelle English Fall risk Assessment Score: 11      Moderate fall risk Interventions  - Bed and strip alarm   - Yellow sign by the door   - Yellow wrist band \"Fall risk\"  - Room near to the nurse station  - Do not leave patient unattended in the bathroom  - Fall risk education provided      Problem: Pain - Standard  Goal: Alleviation of pain or a reduction in pain to the patient’s comfort goal  Note: Educate patient of non-pharmacological comfort measures: repositioning, relaxation/breathing technique, cold compress and activities.           The patient is Watcher - Medium risk of patient condition declining or worsening    Shift Goals  Clinical Goals: Pain management  Patient Goals: Pain management      "

## 2023-04-29 NOTE — THERAPY
Physical Therapy   Daily Treatment     Patient Name: Lorena No  Age:  73 y.o., Sex:  female  Medical Record #: [id number]  Today's Date: 4/29/2023     Precautions  Precautions: Fall Risk, Weight Bearing As Tolerated Left Lower Extremity  Comments: LLE antalgia    Subjective    Patient pleasant and agreeable to participate. States that she feels ready to discharge home tomorrow. Patient's daughter present and very supportive.      Objective       04/29/23 1301   PT Charge Group   PT Therapeutic Activities (Units) 2   PT Total Time Spent   PT Individual Total Time Spent (Mins) 30   Gait Functional Level of Assist    Gait Level Of Assist Supervised   Assistive Device Front Wheel Walker   Distance (Feet) 130   # of Times Distance was Traveled 2   Deviation Antalgic;Decreased Base Of Support;Bradykinetic   Stairs Functional Level of Assist   Level of Assist with Stairs Contact Guard Assist   # of Stairs Climbed 12   Stairs Description Extra time;Hand rails;Supervision for safety   Transfer Functional Level of Assist   Bed, Chair, Wheelchair Transfer Modified Independent   Bed Chair Wheelchair Transfer Description Adaptive equipment;Increased time   Interdisciplinary Plan of Care Collaboration   IDT Collaboration with  Family / Caregiver   Patient Position at End of Therapy Edge of Bed;Family / Friend in Room;Tray Table within Reach   Collaboration Comments Patient's daughter present and supportive       Patient/family education re: stair training, use of gait belt, home health vs. Outpatient therapy services    Assessment    Patient and daughter both receptive to education regarding stair training and discharge therapy recommendations. Patient declined attempting car transfer, stating that she felt confident in doing it with her daughter and has practiced it with primary therapist before. Patient continues to ambulate with antalgic gait pattern, recommended continued use of FWW and to allow home health PT help guide  her regarding reducing use of AD when appropriate.     Strengths: Able to follow instructions, Alert and oriented, Effective communication skills, Good insight into deficits/needs, Independent prior level of function, Making steady progress towards goals, Motivated for self care and independence, Pleasant and cooperative, Willingly participates in therapeutic activities  Barriers: Decreased endurance, Generalized weakness, Home accessibility, Impaired balance, Limited mobility, Pain, Poor family support    Plan    D/C home today (4/29)    Passport items to be completed:  None.

## 2023-04-29 NOTE — PROGRESS NOTES
Pt temp 100.3. Encouraged TCDB and IS use. IS up to 2250. Medicated with Tylenol. Pt denies needs at this time.

## 2023-08-28 RX ADMIN — CEPHALEXIN: 500 CAPSULE ORAL at 00:00

## 2023-08-29 ENCOUNTER — APPOINTMENT (RX ONLY)
Dept: URBAN - METROPOLITAN AREA CLINIC 36 | Facility: CLINIC | Age: 73
Setting detail: DERMATOLOGY
End: 2023-08-29

## 2023-08-29 PROBLEM — C44.212 BASAL CELL CARCINOMA OF SKIN OF RIGHT EAR AND EXTERNAL AURICULAR CANAL: Status: ACTIVE | Noted: 2023-08-29

## 2023-08-29 PROCEDURE — 17311 MOHS 1 STAGE H/N/HF/G: CPT

## 2023-08-29 PROCEDURE — ? MOHS SURGERY

## 2023-08-29 PROCEDURE — ? PRESCRIPTION

## 2023-08-29 PROCEDURE — 17312 MOHS ADDL STAGE: CPT

## 2023-08-29 PROCEDURE — 14060 TIS TRNFR E/N/E/L 10 SQ CM/<: CPT

## 2023-08-29 RX ORDER — CEPHALEXIN 500 MG/1
CAPSULE ORAL
Qty: 21 | Refills: 0 | Status: ERX | COMMUNITY
Start: 2023-08-28

## 2023-08-29 NOTE — HPI: PROCEDURE (MOHS)
Has The Growth Been Previously Biopsied?: has been previously biopsied
Body Location Override (Optional): Right ear mid helix

## 2023-08-29 NOTE — PROCEDURE: MOHS SURGERY
Special Stains Stage 8 - Results: Base On Clearance Noted Above
Bcc Infiltrative Histology Text: There were numerous aggregates of basaloid cells demonstrating an infiltrative pattern.
Mart-1 - Negative Histology Text: MART-1 staining demonstrates a normal density and pattern of melanocytes along the dermal-epidermal junction. The surgical margins are negative for tumor cells.
Helical Rim Text: The closure involved the helical rim.
O-Z Flap Text: The defect edges were debeveled with a #15 scalpel blade.  Given the location of the defect, shape of the defect and the proximity to free margins an O-Z flap was deemed most appropriate.  Using a sterile surgical marker, an appropriate transposition flap was drawn incorporating the defect and placing the expected incisions within the relaxed skin tension lines where possible. The area thus outlined was incised deep to adipose tissue with a #15 scalpel blade.  The skin margins were undermined to an appropriate distance in all directions utilizing iris scissors.
Split-Thickness Skin Graft Text: The defect edges were debeveled with a #15 scalpel blade.  Given the location of the defect, shape of the defect and the proximity to free margins a split thickness skin graft was deemed most appropriate.  Using a sterile surgical marker, the primary defect shape was transferred to the donor site. The split thickness graft was then harvested.  The skin graft was then placed in the primary defect and oriented appropriately.
Repair Anesthesia Type: 1% lidocaine with epinephrine
Repair Hemostasis (Optional): Pinpoint electrocautery
Crescentic Advancement Flap Text: The defect edges were debeveled with a #15 scalpel blade.  Given the location of the defect and the proximity to free margins a crescentic advancement flap was deemed most appropriate.  Using a sterile surgical marker, the appropriate advancement flap was drawn incorporating the defect and placing the expected incisions within the relaxed skin tension lines where possible.    The area thus outlined was incised deep to adipose tissue with a #15 scalpel blade.  The skin margins were undermined to an appropriate distance in all directions utilizing iris scissors.
Mid-Level Procedure Text (E): After obtaining clear surgical margins the patient was sent to a mid-level provider for surgical repair.  The patient understands they will receive post-surgical care and follow-up from the mid-level provider.
Provider Procedure Text (B): After obtaining clear surgical margins the defect was repaired by another provider.
Z Plasty Text: The lesion was extirpated to the level of the fat with a #15 scalpel blade.  Given the location of the defect, shape of the defect and the proximity to free margins a Z-plasty was deemed most appropriate for repair.  Using a sterile surgical marker, the appropriate transposition arms of the Z-plasty were drawn incorporating the defect and placing the expected incisions within the relaxed skin tension lines where possible.    The area thus outlined was incised deep to adipose tissue with a #15 scalpel blade.  The skin margins were undermined to an appropriate distance in all directions utilizing iris scissors.  The opposing transposition arms were then transposed into place in opposite direction and anchored with interrupted buried subcutaneous sutures.
Otolaryngologist Procedure Text (E): After obtaining clear surgical margins the patient was sent to otolaryngology for surgical repair.  The patient understands they will receive post-surgical care and follow-up from the referring physician's office.
Bilateral Rotation Flap Text: The defect edges were debeveled with a #15 scalpel blade. Given the location of the defect, shape of the defect and the proximity to free margins a bilateral rotation flap was deemed most appropriate. Using a sterile surgical marker, an appropriate rotation flap was drawn incorporating the defect and placing the expected incisions within the relaxed skin tension lines where possible. The area thus outlined was incised deep to adipose tissue with a #15 scalpel blade. The skin margins were undermined to an appropriate distance in all directions utilizing iris scissors. Following this, the designed flap was carried over into the primary defect and sutured into place.
Show Referring Provider Variable: Yes
Include Hemigard In Note?: No
Plastic Surgeon Procedure Text (A): After obtaining clear surgical margins the patient was sent to plastics for surgical repair.  The patient understands they will receive post-surgical care and follow-up from the referring physician's office.
Dressing: pressure dressing with telfa
Closure 4 Information: This tab is for additional flaps and grafts above and beyond our usual structured repairs.  Please note if you enter information here it will not currently bill and you will need to add the billing information manually.
Oculoplastic Surgeon Procedure Text (A): After obtaining clear surgical margins the patient was sent to oculoplastics for surgical repair.  The patient understands they will receive post-surgical care and follow-up from the referring physician's office.
Star Wedge Flap Text: The defect edges were debeveled with a #15 scalpel blade.  Given the location of the defect, shape of the defect and the proximity to free margins a star wedge flap was deemed most appropriate.  Using a sterile surgical marker, an appropriate rotation flap was drawn incorporating the defect and placing the expected incisions within the relaxed skin tension lines where possible. The area thus outlined was incised deep to adipose tissue with a #15 scalpel blade.  The skin margins were undermined to an appropriate distance in all directions utilizing iris scissors.
Is There Documentation In The Chart Showing Discussion Of Changes With Another Physician?: Please Select the Appropriate Response
A-T Advancement Flap Text: The defect edges were debeveled with a #15 scalpel blade.  Given the location of the defect, shape of the defect and the proximity to free margins an A-T advancement flap was deemed most appropriate.  Using a sterile surgical marker, an appropriate advancement flap was drawn incorporating the defect and placing the expected incisions within the relaxed skin tension lines where possible.    The area thus outlined was incised deep to adipose tissue with a #15 scalpel blade.  The skin margins were undermined to an appropriate distance in all directions utilizing iris scissors.
Stage 10: Number Of Blocks?: 0
Retention Suture Bite Size: 1 mm
Repair Type: Flap
Pinch Graft Text: The defect edges were debeveled with a #15 scalpel blade. Given the location of the defect, shape of the defect and the proximity to free margins a pinch graft was deemed most appropriate. Using a sterile surgical marker, the primary defect shape was transferred to the donor site. The area thus outlined was incised deep to adipose tissue with a #15 scalpel blade.  The harvested graft was then trimmed of adipose tissue until only dermis and epidermis was left. The skin margins of the secondary defect were undermined to an appropriate distance in all directions utilizing iris scissors.  The secondary defect was closed with interrupted buried subcutaneous sutures.  The skin edges were then re-apposed with running  sutures.  The skin graft was then placed in the primary defect and oriented appropriately.
Referred To Asc For Closure Text (Leave Blank If You Do Not Want): After obtaining clear surgical margins the patient was sent to an ASC for surgical repair.  The patient understands they will receive post-surgical care and follow-up from the ASC physician.
Mucosal Advancement Flap Text: Given the location of the defect, shape of the defect and the proximity to free margins a mucosal advancement flap was deemed most appropriate. Incisions were made with a 15 blade scalpel in the appropriate fashion along the cutaneous vermilion border and the mucosal lip. The remaining actinically damaged mucosal tissue was excised.  The mucosal advancement flap was then elevated to the gingival sulcus with care taken to preserve the neurovascular structures and advanced into the primary defect. Care was taken to ensure that precise realignment of the vermilion border was achieved.
Zygomaticofacial Flap Text: Given the location of the defect, shape of the defect and the proximity to free margins a zygomaticofacial flap was deemed most appropriate for repair.  Using a sterile surgical marker, the appropriate flap was drawn incorporating the defect and placing the expected incisions within the relaxed skin tension lines where possible. The area thus outlined was incised deep to adipose tissue with a #15 scalpel blade with preservation of a vascular pedicle.  The skin margins were undermined to an appropriate distance in all directions utilizing iris scissors.  The flap was then placed into the defect and anchored with interrupted buried subcutaneous sutures.
Suturegard Body: The suture ends were repeatedly re-tightened and re-clamped to achieve the desired tissue expansion.
Postop Diagnosis: same
Wound Care: Vaseline
Where Do You Want The Question To Include Opioid Counseling Located?: Case Summary Tab
Consent (Scalp)/Introductory Paragraph: The rationale for Mohs was explained to the patient and consent was obtained. The risks, benefits and alternatives to therapy were discussed in detail. Specifically, the risks of changes in hair growth pattern secondary to repair, infection, scarring, bleeding, prolonged wound healing, incomplete removal, allergy to anesthesia, nerve injury and recurrence were addressed. Prior to the procedure, the treatment site was clearly identified and confirmed by the patient. All components of Universal Protocol/PAUSE Rule completed.
Graft Donor Site Bandage (Optional-Leave Blank If You Don't Want In Note): Aquaplast was fitted to the graft site and sewn into place. A pressure bandage were applied to the donor site and over the aquaplast bolster.
Repair Anesthesia Method: local infiltration
Modified Advancement Flap Text: The defect edges were debeveled with a #15 scalpel blade.  Given the location of the defect, shape of the defect and the proximity to free margins a modified advancement flap was deemed most appropriate.  Using a sterile surgical marker, an appropriate advancement flap was drawn incorporating the defect and placing the expected incisions within the relaxed skin tension lines where possible.    The area thus outlined was incised deep to adipose tissue with a #15 scalpel blade.  The skin margins were undermined to an appropriate distance in all directions utilizing iris scissors.
Mohs Case Number: [id number]
Surgical Defect Width In Cm (Optional): 0.7
Epidermal Autograft Text: The defect edges were debeveled with a #15 scalpel blade.  Given the location of the defect, shape of the defect and the proximity to free margins an epidermal autograft was deemed most appropriate.  Using a sterile surgical marker, the primary defect shape was transferred to the donor site. The epidermal graft was then harvested.  The skin graft was then placed in the primary defect and oriented appropriately.
Alar Island Pedicle Flap Text: The defect edges were debeveled with a #15 scalpel blade.  Given the location of the defect, shape of the defect and the proximity to the alar rim an island pedicle advancement flap was deemed most appropriate.  Using a sterile surgical marker, an appropriate advancement flap was drawn incorporating the defect, outlining the appropriate donor tissue and placing the expected incisions within the nasal ala running parallel to the alar rim. The area thus outlined was incised with a #15 scalpel blade.  The skin margins were undermined minimally to an appropriate distance in all directions around the primary defect and laterally outward around the island pedicle utilizing iris scissors.  There was minimal undermining beneath the pedicle flap.
Medical Necessity Statement: Based on my medical judgement, Mohs surgery is the most appropriate treatment for this cancer compared to other treatments.
Intermediate Repair And Graft Additional Text (Will Appearing After The Standard Complex Repair Text): The intermediate repair was not sufficient to completely close the primary defect. The remaining additional defect was repaired with the graft mentioned below.
Secondary Intention Text (Leave Blank If You Do Not Want): The defect will heal with secondary intention.
Complex Repair And Graft Additional Text (Will Appearing After The Standard Complex Repair Text): The complex repair was not sufficient to completely close the primary defect. The remaining additional defect was repaired with the graft mentioned below.
Wound Care (No Sutures): Petrolatum
Suturegard Retention Suture: 0-0 Nylon
Tumor Debulked?: curette
V-Y Plasty Text: The defect edges were debeveled with a #15 scalpel blade.  Given the location of the defect, shape of the defect and the proximity to free margins an V-Y advancement flap was deemed most appropriate.  Using a sterile surgical marker, an appropriate advancement flap was drawn incorporating the defect and placing the expected incisions within the relaxed skin tension lines where possible.    The area thus outlined was incised deep to adipose tissue with a #15 scalpel blade.  The skin margins were undermined to an appropriate distance in all directions utilizing iris scissors.
Melolabial Transposition Flap Text: The defect edges were debeveled with a #15 scalpel blade.  Given the location of the defect and the proximity to free margins a melolabial flap was deemed most appropriate.  Using a sterile surgical marker, an appropriate melolabial transposition flap was drawn incorporating the defect.    The area thus outlined was incised deep to adipose tissue with a #15 scalpel blade.  The skin margins were undermined to an appropriate distance in all directions utilizing iris scissors.
Ftsg Text: The defect edges were debeveled with a #15 scalpel blade.  Given the location of the defect, shape of the defect and the proximity to free margins a full thickness skin graft was deemed most appropriate.  Using a sterile surgical marker, the primary defect shape was transferred to the donor site. The area thus outlined was incised deep to adipose tissue with a #15 scalpel blade.  The harvested graft was then trimmed of adipose tissue until only dermis and epidermis was left.  The skin margins of the secondary defect were undermined to an appropriate distance in all directions utilizing iris scissors.  The secondary defect was closed with interrupted buried subcutaneous sutures.  The skin edges were then re-apposed with running  sutures.  The skin graft was then placed in the primary defect and oriented appropriately.
Melolabial Interpolation Flap Text: A decision was made to reconstruct the defect utilizing an interpolation axial flap and a staged reconstruction.  A telfa template was made of the defect.  This telfa template was then used to outline the melolabial interpolation flap.  The donor area for the pedicle flap was then injected with anesthesia.  The flap was excised through the skin and subcutaneous tissue down to the layer of the underlying musculature.  The pedicle flap was carefully excised within this deep plane to maintain its blood supply.  The edges of the donor site were undermined.   The donor site was closed in a primary fashion.  The pedicle was then rotated into position and sutured.  Once the tube was sutured into place, adequate blood supply was confirmed with blanching and refill.  The pedicle was then wrapped with xeroform gauze and dressed appropriately with a telfa and gauze bandage to ensure continued blood supply and protect the attached pedicle.
Banner Transposition Flap Text: The defect edges were debeveled with a #15 scalpel blade.  Given the location of the defect and the proximity to free margins a Banner transposition flap was deemed most appropriate.  Using a sterile surgical marker, an appropriate flap drawn around the defect. The area thus outlined was incised deep to adipose tissue with a #15 scalpel blade.  The skin margins were undermined to an appropriate distance in all directions utilizing iris scissors.
Consent 3/Introductory Paragraph: I gave the patient a chance to ask questions they had about the procedure.  Following this I explained the Mohs procedure and consent was obtained. The risks, benefits and alternatives to therapy were discussed in detail. Specifically, the risks of infection, scarring, bleeding, prolonged wound healing, incomplete removal, allergy to anesthesia, nerve injury and recurrence were addressed. Prior to the procedure, the treatment site was clearly identified and confirmed by the patient. All components of Universal Protocol/PAUSE Rule completed.
Mauc Instructions: By selecting yes to the question below the MAUC number will be added into the note.  This will be calculated automatically based on the diagnosis chosen, the size entered, the body zone selected (H,M,L) and the specific indications you chose. You will also have the option to override the Mohs AUC if you disagree with the automatically calculated number and this option is found in the Case Summary tab.
Xenograft Text: The defect edges were debeveled with a #15 scalpel blade.  Given the location of the defect, shape of the defect and the proximity to free margins a xenograft was deemed most appropriate.  The graft was then trimmed to fit the size of the defect.  The graft was then placed in the primary defect and oriented appropriately.
Staged Advancement Flap Text: The defect edges were debeveled with a #15 scalpel blade.  Given the location of the defect, shape of the defect and the proximity to free margins a staged advancement flap was deemed most appropriate.  Using a sterile surgical marker, an appropriate advancement flap was drawn incorporating the defect and placing the expected incisions within the relaxed skin tension lines where possible. The area thus outlined was incised deep to adipose tissue with a #15 scalpel blade.  The skin margins were undermined to an appropriate distance in all directions utilizing iris scissors.
Mohs Histo Method Verbiage: Each section was then chromacoded and processed in the Mohs lab using the Mohs protocol and submitted for frozen section.
Posterior Auricular Interpolation Flap Text: A decision was made to reconstruct the defect utilizing an interpolation axial flap and a staged reconstruction.  A telfa template was made of the defect.  This telfa template was then used to outline the posterior auricular interpolation flap.  The donor area for the pedicle flap was then injected with anesthesia.  The flap was excised through the skin and subcutaneous tissue down to the layer of the underlying musculature.  The pedicle flap was carefully excised within this deep plane to maintain its blood supply.  The edges of the donor site were undermined.   The donor site was closed in a primary fashion.  The pedicle was then rotated into position and sutured.  Once the tube was sutured into place, adequate blood supply was confirmed with blanching and refill.  The pedicle was then wrapped with xeroform gauze and dressed appropriately with a telfa and gauze bandage to ensure continued blood supply and protect the attached pedicle.
Lazy S Complex Repair Preamble Text (Leave Blank If You Do Not Want): Extensive wide undermining was performed.
Partial Purse String (Simple) Text: Given the location of the defect and the characteristics of the surrounding skin a simple purse string closure was deemed most appropriate.  Undermining was performed circumfirentially around the surgical defect.  A purse string suture was then placed and tightened. Wound tension only allowed a partial closure of the circular defect.
Tumor Depth: Less than 6mm from granular layer and no invasion beyond the subcutaneous fat
Lazy S Intermediate Repair Preamble Text (Leave Blank If You Do Not Want): Undermining was performed with blunt dissection.
Muscle Hinge Flap Text: The defect edges were debeveled with a #15 scalpel blade.  Given the size, depth and location of the defect and the proximity to free margins a muscle hinge flap was deemed most appropriate.  Using a sterile surgical marker, an appropriate hinge flap was drawn incorporating the defect. The area thus outlined was incised with a #15 scalpel blade.  The skin margins were undermined to an appropriate distance in all directions utilizing iris scissors.
Purse String (Intermediate) Text: Given the location of the defect and the characteristics of the surrounding skin a purse string intermediate closure was deemed most appropriate.  Undermining was performed circumfirentially around the surgical defect.  A purse string suture was then placed and tightened.
Orbicularis Oris Muscle Flap Text: The defect edges were debeveled with a #15 scalpel blade.  Given that the defect affected the competency of the oral sphincter an orbicularis oris muscle flap was deemed most appropriate to restore this competency and normal muscle function.  Using a sterile surgical marker, an appropriate flap was drawn incorporating the defect. The area thus outlined was incised with a #15 scalpel blade.
Bcc Histology Text: There were numerous aggregates of basaloid cells.
Suturegard Intro: Intraoperative tissue expansion was performed, utilizing the SUTUREGARD device, in order to reduce wound tension.
Surgical Defect Length In Cm (Optional): 1.3
Consent (Ear)/Introductory Paragraph: The rationale for Mohs was explained to the patient and consent was obtained. The risks, benefits and alternatives to therapy were discussed in detail. Specifically, the risks of ear deformity, infection, scarring, bleeding, prolonged wound healing, incomplete removal, allergy to anesthesia, nerve injury and recurrence were addressed. Prior to the procedure, the treatment site was clearly identified and confirmed by the patient. All components of Universal Protocol/PAUSE Rule completed.
Post-Care Instructions: I reviewed with the patient in detail post-care instructions. Patient is not to engage in any heavy lifting, exercise, or swimming for the next 14 days. Should the patient develop any fevers, chills, bleeding, severe pain patient will contact the office immediately.
Estimated Blood Loss (Cc): minimal
Bilobed Transposition Flap Text: The defect edges were debeveled with a #15 scalpel blade.  Given the location of the defect and the proximity to free margins a bilobed transposition flap was deemed most appropriate.  Using a sterile surgical marker, an appropriate bilobe flap drawn around the defect.    The area thus outlined was incised deep to adipose tissue with a #15 scalpel blade.  The skin margins were undermined to an appropriate distance in all directions utilizing iris scissors.
Abbe Flap (Upper To Lower Lip) Text: The defect of the lower lip was assessed and measured.  Given the location and size of the defect, an Abbe flap was deemed most appropriate.  Using a sterile surgical marker, an appropriate Abbe flap was measured and drawn on the upper lip. Local anesthesia was then infiltrated.  A scalpel was then used to incise the upper lip through and through the skin, vermilion, muscle and mucosa, leaving the flap pedicled on the opposite side.  The flap was then rotated and transferred to the lower lip defect.  The flap was then sutured into place with a three layer technique, closing the orbicularis oris muscle layer with subcutaneous buried sutures, followed by a mucosal layer and an epidermal layer.
H Plasty Text: Given the location of the defect, shape of the defect and the proximity to free margins a H-plasty was deemed most appropriate for repair.  Using a sterile surgical marker, the appropriate advancement arms of the H-plasty were drawn incorporating the defect and placing the expected incisions within the relaxed skin tension lines where possible. The area thus outlined was incised deep to adipose tissue with a #15 scalpel blade. The skin margins were undermined to an appropriate distance in all directions utilizing iris scissors.  The opposing advancement arms were then advanced into place in opposite direction and anchored with interrupted buried subcutaneous sutures.
Closure 2 Information: This tab is for additional flaps and grafts, including complex repair and grafts and complex repair and flaps. You can also specify a different location for the additional defect, if the location is the same you do not need to select a new one. We will insert the automated text for the repair you select below just as we do for solitary flaps and grafts. Please note that at this time if you select a location with a different insurance zone you will need to override the ICD10 and CPT if appropriate.
Helical Rim Advancement Flap Text: The defect edges were debeveled with a #15 blade scalpel.  Given the location of the defect and the proximity to free margins (helical rim) a double helical rim advancement flap was deemed most appropriate.  Using a sterile surgical marker, the appropriate advancement flaps were drawn incorporating the defect and placing the expected incisions between the helical rim and antihelix where possible.  The area thus outlined was incised through and through with a #15 scalpel blade.  With a skin hook and iris scissors, the flaps were gently and sharply undermined and freed up.
Hemigard Intro: Due to skin fragility and wound tension, it was decided to use HEMIGARD adhesive retention suture devices to permit a linear closure. The skin was cleaned and dried for a 6cm distance away from the wound. Excessive hair, if present, was removed to allow for adhesion.
Nostril Rim Text: The closure involved the nostril rim.
No Repair - Repaired With Adjacent Surgical Defect Text (Leave Blank If You Do Not Want): After obtaining clear surgical margins the defect was repaired concurrently with another surgical defect which was in close approximation.
Rotation Flap Text: The defect edges were debeveled with a #15 scalpel blade.  Given the location of the defect, shape of the defect and the proximity to free margins a rotation flap was deemed most appropriate.  Using a sterile surgical marker, an appropriate rotation flap was drawn incorporating the defect and placing the expected incisions within the relaxed skin tension lines where possible.    The area thus outlined was incised deep to adipose tissue with a #15 scalpel blade.  The skin margins were undermined to an appropriate distance in all directions utilizing iris scissors.
Area H Indication Text: Tumors in this location are included in Area H (eyelids, eyebrows, nose, lips, chin, ear, pre-auricular, post-auricular, temple, genitalia, hands, feet, ankles and areola).  Tissue conservation is critical in these anatomic locations.
Staging Info: By selecting yes to the question above you will include information on AJCC 8 tumor staging in your Mohs note. Information on tumor staging will be automatically added for SCCs on the head and neck. AJCC 8 includes tumor size, tumor depth, perineural involvement and bone invasion.
Estlander Flap (Upper To Lower Lip) Text: The defect of the lower lip was assessed and measured.  Given the location and size of the defect, an Estlander flap was deemed most appropriate.  Using a sterile surgical marker, an appropriate Estlander flap was measured and drawn on the upper lip. Local anesthesia was then infiltrated. A scalpel was then used to incise the lateral aspect of the flap, through skin, muscle and mucosa, leaving the flap pedicled medially.  The flap was then rotated and positioned to fill the lower lip defect.  The flap was then sutured into place with a three layer technique, closing the orbicularis oris muscle layer with subcutaneous buried sutures, followed by a mucosal layer and an epidermal layer.
Spiral Flap Text: The defect edges were debeveled with a #15 scalpel blade.  Given the location of the defect, shape of the defect and the proximity to free margins a spiral flap was deemed most appropriate.  Using a sterile surgical marker, an appropriate rotation flap was drawn incorporating the defect and placing the expected incisions within the relaxed skin tension lines where possible. The area thus outlined was incised deep to adipose tissue with a #15 scalpel blade.  The skin margins were undermined to an appropriate distance in all directions utilizing iris scissors.
Area L Indication Text: Tumors in this location are included in Area L (trunk and extremities).  Mohs surgery is indicated for larger tumors, or tumors with aggressive histologic features, in these anatomic locations.
Detail Level: Detailed
Anesthesia Volume In Cc: 3
Consent (Lip)/Introductory Paragraph: The rationale for Mohs was explained to the patient and consent was obtained. The risks, benefits and alternatives to therapy were discussed in detail. Specifically, the risks of lip deformity, changes in the oral aperture, infection, scarring, bleeding, prolonged wound healing, incomplete removal, allergy to anesthesia, nerve injury and recurrence were addressed. Prior to the procedure, the treatment site was clearly identified and confirmed by the patient. All components of Universal Protocol/PAUSE Rule completed.
Tissue Cultured Epidermal Autograft Text: The defect edges were debeveled with a #15 scalpel blade.  Given the location of the defect, shape of the defect and the proximity to free margins a tissue cultured epidermal autograft was deemed most appropriate.  The graft was then trimmed to fit the size of the defect.  The graft was then placed in the primary defect and oriented appropriately.
Rhomboid Transposition Flap Text: The defect edges were debeveled with a #15 scalpel blade.  Given the location of the defect and the proximity to free margins a rhomboid transposition flap was deemed most appropriate.  Using a sterile surgical marker, an appropriate rhomboid flap was drawn incorporating the defect.    The area thus outlined was incised deep to adipose tissue with a #15 scalpel blade.  The skin margins were undermined to an appropriate distance in all directions utilizing iris scissors.
Island Pedicle Flap Text: The defect edges were debeveled with a #15 scalpel blade.  Given the location of the defect, shape of the defect and the proximity to free margins an island pedicle advancement flap was deemed most appropriate.  Using a sterile surgical marker, an appropriate advancement flap was drawn incorporating the defect, outlining the appropriate donor tissue and placing the expected incisions within the relaxed skin tension lines where possible.    The area thus outlined was incised deep to adipose tissue with a #15 scalpel blade.  The skin margins were undermined to an appropriate distance in all directions around the primary defect and laterally outward around the island pedicle utilizing iris scissors.  There was minimal undermining beneath the pedicle flap.
Surgeon/Pathologist Verbiage (Will Incorporate Name Of Surgeon From Intro If Not Blank): operated in two distinct and integrated capacities as the surgeon and pathologist.
Inflammation Suggestive Of Cancer Camouflage Histology Text: There was a dense lymphocytic infiltrate which prevented adequate histologic evaluation of adjacent structures.
Trilobed Flap Text: The defect edges were debeveled with a #15 scalpel blade.  Given the location of the defect and the proximity to free margins a trilobed flap was deemed most appropriate.  Using a sterile surgical marker, an appropriate trilobed flap drawn around the defect.    The area thus outlined was incised deep to adipose tissue with a #15 scalpel blade.  The skin margins were undermined to an appropriate distance in all directions utilizing iris scissors.
Full Thickness Lip Wedge Repair (Flap) Text: Given the location of the defect and the proximity to free margins a full thickness wedge repair was deemed most appropriate.  Using a sterile surgical marker, the appropriate repair was drawn incorporating the defect and placing the expected incisions perpendicular to the vermilion border.  The vermilion border was also meticulously outlined to ensure appropriate reapproximation during the repair.  The area thus outlined was incised through and through with a #15 scalpel blade.  The muscularis and dermis were reaproximated with deep sutures following hemostasis. Care was taken to realign the vermilion border before proceeding with the superficial closure.  Once the vermilion was realigned the superfical and mucosal closure was finished.
Consent 2/Introductory Paragraph: Mohs surgery was explained to the patient and consent was obtained. The risks, benefits and alternatives to therapy were discussed in detail. Specifically, the risks of infection, scarring, bleeding, prolonged wound healing, incomplete removal, allergy to anesthesia, nerve injury and recurrence were addressed. Prior to the procedure, the treatment site was clearly identified and confirmed by the patient. All components of Universal Protocol/PAUSE Rule completed.
Chonodrocutaneous Helical Advancement Flap Text: The defect edges were debeveled with a #15 scalpel blade.  Given the location of the defect and the proximity to free margins a chondrocutaneous helical advancement flap was deemed most appropriate.  Using a sterile surgical marker, the appropriate advancement flap was drawn incorporating the defect and placing the expected incisions within the relaxed skin tension lines where possible.    The area thus outlined was incised deep to adipose tissue with a #15 scalpel blade.  The skin margins were undermined to an appropriate distance in all directions utilizing iris scissors.
Surgeon: Niya Amaral MD
Rhombic Flap Text: The defect edges were debeveled with a #15 scalpel blade.  Given the location of the defect and the proximity to free margins a rhombic flap was deemed most appropriate.  Using a sterile surgical marker, an appropriate rhombic flap was drawn incorporating the defect.    The area thus outlined was incised deep to adipose tissue with a #15 scalpel blade.  The skin margins were undermined to an appropriate distance in all directions utilizing iris scissors.
Number Of Hemigard Strips Per Side: 1
Secondary Defect Width In Cm (Required For Flaps): 2
Ear Wedge Repair Text: A wedge excision was completed by carrying down an excision through the full thickness of the ear and cartilage with an inward facing Burow's triangle. The wound was then closed in a layered fashion.
Mart-1 - Positive Histology Text: MART-1 staining demonstrates areas of higher density and clustering of melanocytes with Pagetoid spread upwards within the epidermis. The surgical margins are positive for tumor cells.
O-T Advancement Flap Text: The defect edges were debeveled with a #15 scalpel blade.  Given the location of the defect, shape of the defect and the proximity to free margins an O-T advancement flap was deemed most appropriate.  Using a sterile surgical marker, an appropriate advancement flap was drawn incorporating the defect and placing the expected incisions within the relaxed skin tension lines where possible.    The area thus outlined was incised deep to adipose tissue with a #15 scalpel blade.  The skin margins were undermined to an appropriate distance in all directions utilizing iris scissors.
Adjacent Tissue Transfer Text: The defect edges were debeveled with a #15 scalpel blade.  Given the location of the defect and the proximity to free margins an adjacent tissue transfer was deemed most appropriate.  Using a sterile surgical marker, an appropriate flap was drawn incorporating the defect and placing the expected incisions within the relaxed skin tension lines where possible.    The area thus outlined was incised deep to adipose tissue with a #15 scalpel blade.  The skin margins were undermined to an appropriate distance in all directions utilizing iris scissors.
Localized Dermabrasion With Wire Brush Text: The patient was draped in routine manner.  Localized dermabrasion using 3 x 17 mm wire brush was performed in routine manner to papillary dermis. This spot dermabrasion is being performed to complete skin cancer reconstruction. It also will eliminate the other sun damaged precancerous cells that are known to be part of the regional effect of a lifetime's worth of sun exposure. This localized dermabrasion is therapeutic and should not be considered cosmetic in any regard.
Purse String (Simple) Text: Given the location of the defect and the characteristics of the surrounding skin a purse string closure was deemed most appropriate.  Undermining was performed circumfirentially around the surgical defect.  A purse string suture was then placed and tightened.
Cheek Interpolation Flap Text: A decision was made to reconstruct the defect utilizing an interpolation axial flap and a staged reconstruction.  A telfa template was made of the defect.  This telfa template was then used to outline the Cheek Interpolation flap.  The donor area for the pedicle flap was then injected with anesthesia.  The flap was excised through the skin and subcutaneous tissue down to the layer of the underlying musculature.  The interpolation flap was carefully excised within this deep plane to maintain its blood supply.  The edges of the donor site were undermined.   The donor site was closed in a primary fashion.  The pedicle was then rotated into position and sutured.  Once the tube was sutured into place, adequate blood supply was confirmed with blanching and refill.  The pedicle was then wrapped with xeroform gauze and dressed appropriately with a telfa and gauze bandage to ensure continued blood supply and protect the attached pedicle.
Surgical Defect Length In Cm (Optional): 1.1
Pain Refusal Text: I offered to prescribe pain medication but the patient refused to take this medication.
Mustarde Flap Text: The defect edges were debeveled with a #15 scalpel blade.  Given the size, depth and location of the defect and the proximity to free margins a Mustarde flap was deemed most appropriate.  Using a sterile surgical marker, an appropriate flap was drawn incorporating the defect. The area thus outlined was incised with a #15 scalpel blade.  The skin margins were undermined to an appropriate distance in all directions utilizing iris scissors.
O-L Flap Text: The defect edges were debeveled with a #15 scalpel blade.  Given the location of the defect, shape of the defect and the proximity to free margins an O-L flap was deemed most appropriate.  Using a sterile surgical marker, an appropriate advancement flap was drawn incorporating the defect and placing the expected incisions within the relaxed skin tension lines where possible.    The area thus outlined was incised deep to adipose tissue with a #15 scalpel blade.  The skin margins were undermined to an appropriate distance in all directions utilizing iris scissors.
Cheek-To-Nose Interpolation Flap Text: A decision was made to reconstruct the defect utilizing an interpolation axial flap and a staged reconstruction.  A telfa template was made of the defect.  This telfa template was then used to outline the Cheek-To-Nose Interpolation flap.  The donor area for the pedicle flap was then injected with anesthesia.  The flap was excised through the skin and subcutaneous tissue down to the layer of the underlying musculature.  The interpolation flap was carefully excised within this deep plane to maintain its blood supply.  The edges of the donor site were undermined.   The donor site was closed in a primary fashion.  The pedicle was then rotated into position and sutured.  Once the tube was sutured into place, adequate blood supply was confirmed with blanching and refill.  The pedicle was then wrapped with xeroform gauze and dressed appropriately with a telfa and gauze bandage to ensure continued blood supply and protect the attached pedicle.
Advancement Flap (Single) Text: The defect edges were debeveled with a #15 scalpel blade.  Given the location of the defect and the proximity to free margins a single advancement flap was deemed most appropriate.  Using a sterile surgical marker, an appropriate advancement flap was drawn incorporating the defect and placing the expected incisions within the relaxed skin tension lines where possible.    The area thus outlined was incised deep to adipose tissue with a #15 scalpel blade.  The skin margins were undermined to an appropriate distance in all directions utilizing iris scissors.
Island Pedicle Flap-Requiring Vessel Identification Text: The defect edges were debeveled with a #15 scalpel blade.  Given the location of the defect, shape of the defect and the proximity to free margins an island pedicle advancement flap was deemed most appropriate.  Using a sterile surgical marker, an appropriate advancement flap was drawn, based on the axial vessel mentioned above, incorporating the defect, outlining the appropriate donor tissue and placing the expected incisions within the relaxed skin tension lines where possible.    The area thus outlined was incised deep to adipose tissue with a #15 scalpel blade.  The skin margins were undermined to an appropriate distance in all directions around the primary defect and laterally outward around the island pedicle utilizing iris scissors.  There was minimal undermining beneath the pedicle flap.
Consent (Spinal Accessory)/Introductory Paragraph: The rationale for Mohs was explained to the patient and consent was obtained. The risks, benefits and alternatives to therapy were discussed in detail. Specifically, the risks of damage to the spinal accessory nerve, infection, scarring, bleeding, prolonged wound healing, incomplete removal, allergy to anesthesia, and recurrence were addressed. Prior to the procedure, the treatment site was clearly identified and confirmed by the patient. All components of Universal Protocol/PAUSE Rule completed.
Dermal Autograft Text: The defect edges were debeveled with a #15 scalpel blade.  Given the location of the defect, shape of the defect and the proximity to free margins a dermal autograft was deemed most appropriate.  Using a sterile surgical marker, the primary defect shape was transferred to the donor site. The area thus outlined was incised deep to adipose tissue with a #15 scalpel blade.  The harvested graft was then trimmed of adipose and epidermal tissue until only dermis was left.  The skin graft was then placed in the primary defect and oriented appropriately.
Consent 1/Introductory Paragraph: The rationale for Mohs was explained to the patient and consent was obtained. The risks, benefits and alternatives to therapy were discussed in detail. Specifically, the risks of infection, scarring, bleeding, prolonged wound healing, incomplete removal, allergy to anesthesia, nerve injury and recurrence were addressed. Prior to the procedure, the treatment site was clearly identified and confirmed by the patient. All components of Universal Protocol/PAUSE Rule completed.
Consent (Marginal Mandibular)/Introductory Paragraph: The rationale for Mohs was explained to the patient and consent was obtained. The risks, benefits and alternatives to therapy were discussed in detail. Specifically, the risks of damage to the marginal mandibular branch of the facial nerve, infection, scarring, bleeding, prolonged wound healing, incomplete removal, allergy to anesthesia, and recurrence were addressed. Prior to the procedure, the treatment site was clearly identified and confirmed by the patient. All components of Universal Protocol/PAUSE Rule completed.
Subsequent Stages Histo Method Verbiage: Using a similar technique to that described above, a thin layer of tissue was removed from all areas where tumor was visible on the previous stage.  The tissue was again oriented, mapped, dyed, and processed as above.
Alternatives Discussed Intro (Do Not Add Period): I discussed alternative treatments to Mohs surgery and specifically discussed the risks and benefits of
W Plasty Text: The lesion was extirpated to the level of the fat with a #15 scalpel blade.  Given the location of the defect, shape of the defect and the proximity to free margins a W-plasty was deemed most appropriate for repair.  Using a sterile surgical marker, the appropriate transposition arms of the W-plasty were drawn incorporating the defect and placing the expected incisions within the relaxed skin tension lines where possible.    The area thus outlined was incised deep to adipose tissue with a #15 scalpel blade.  The skin margins were undermined to an appropriate distance in all directions utilizing iris scissors.  The opposing transposition arms were then transposed into place in opposite direction and anchored with interrupted buried subcutaneous sutures.
Mercedes Flap Text: The defect edges were debeveled with a #15 scalpel blade.  Given the location of the defect, shape of the defect and the proximity to free margins a Mercedes flap was deemed most appropriate.  Using a sterile surgical marker, an appropriate advancement flap was drawn incorporating the defect and placing the expected incisions within the relaxed skin tension lines where possible. The area thus outlined was incised deep to adipose tissue with a #15 scalpel blade.  The skin margins were undermined to an appropriate distance in all directions utilizing iris scissors.
Nasal Turnover Hinge Flap Text: The defect edges were debeveled with a #15 scalpel blade.  Given the size, depth, location of the defect and the defect being full thickness a nasal turnover hinge flap was deemed most appropriate.  Using a sterile surgical marker, an appropriate hinge flap was drawn incorporating the defect. The area thus outlined was incised with a #15 scalpel blade. The flap was designed to recreate the nasal mucosal lining and the alar rim. The skin margins were undermined to an appropriate distance in all directions utilizing iris scissors.
Bilateral Helical Rim Advancement Flap Text: The defect edges were debeveled with a #15 blade scalpel.  Given the location of the defect and the proximity to free margins (helical rim) a bilateral helical rim advancement flap was deemed most appropriate.  Using a sterile surgical marker, the appropriate advancement flaps were drawn incorporating the defect and placing the expected incisions between the helical rim and antihelix where possible.  The area thus outlined was incised through and through with a #15 scalpel blade.  With a skin hook and iris scissors, the flaps were gently and sharply undermined and freed up.
Abbe Flap (Lower To Upper Lip) Text: The defect of the upper lip was assessed and measured.  Given the location and size of the defect, an Abbe flap was deemed most appropriate.  Using a sterile surgical marker, an appropriate Abbe flap was measured and drawn on the lower lip. Local anesthesia was then infiltrated. A scalpel was then used to incise the upper lip through and through the skin, vermilion, muscle and mucosa, leaving the flap pedicled on the opposite side.  The flap was then rotated and transferred to the lower lip defect.  The flap was then sutured into place with a three layer technique, closing the orbicularis oris muscle layer with subcutaneous buried sutures, followed by a mucosal layer and an epidermal layer.
Epidermal Sutures: 5-0 Surgipro
Flap Type: Advancement Flap (Single)
Island Pedicle Flap With Canthal Suspension Text: The defect edges were debeveled with a #15 scalpel blade.  Given the location of the defect, shape of the defect and the proximity to free margins an island pedicle advancement flap was deemed most appropriate.  Using a sterile surgical marker, an appropriate advancement flap was drawn incorporating the defect, outlining the appropriate donor tissue and placing the expected incisions within the relaxed skin tension lines where possible. The area thus outlined was incised deep to adipose tissue with a #15 scalpel blade.  The skin margins were undermined to an appropriate distance in all directions around the primary defect and laterally outward around the island pedicle utilizing iris scissors.  There was minimal undermining beneath the pedicle flap. A suspension suture was placed in the canthal tendon to prevent tension and prevent ectropion.
Nasalis-Muscle-Based Myocutaneous Island Pedicle Flap Text: Using a #15 blade, an incision was made around the donor flap to the level of the nasalis muscle. Wide lateral undermining was then performed in both the subcutaneous plane above the nasalis muscle, and in a submuscular plane just above periosteum. This allowed the formation of a free nasalis muscle axial pedicle (based on the angular artery) which was still attached to the actual cutaneous flap, increasing its mobility and vascular viability. Hemostasis was obtained with pinpoint electrocoagulation. The flap was mobilized into position and the pivotal anchor points positioned and stabilized with buried interrupted sutures. Subcutaneous and dermal tissues were closed in a multilayered fashion with sutures. Tissue redundancies were excised, and the epidermal edges were apposed without significant tension and sutured with sutures.
Paramedian Forehead Flap Text: A decision was made to reconstruct the defect utilizing an interpolation axial flap and a staged reconstruction.  A telfa template was made of the defect.  This telfa template was then used to outline the paramedian forehead pedicle flap.  The donor area for the pedicle flap was then injected with anesthesia.  The flap was excised through the skin and subcutaneous tissue down to the layer of the underlying musculature.  The pedicle flap was carefully excised within this deep plane to maintain its blood supply.  The edges of the donor site were undermined.   The donor site was closed in a primary fashion.  The pedicle was then rotated into position and sutured.  Once the tube was sutured into place, adequate blood supply was confirmed with blanching and refill.  The pedicle was then wrapped with xeroform gauze and dressed appropriately with a telfa and gauze bandage to ensure continued blood supply and protect the attached pedicle.
Epidermal Closure: running cuticular
Burow's Advancement Flap Text: The defect edges were debeveled with a #15 scalpel blade.  Given the location of the defect and the proximity to free margins a Burow's advancement flap was deemed most appropriate.  Using a sterile surgical marker, the appropriate advancement flap was drawn incorporating the defect and placing the expected incisions within the relaxed skin tension lines where possible.    The area thus outlined was incised deep to adipose tissue with a #15 scalpel blade.  The skin margins were undermined to an appropriate distance in all directions utilizing iris scissors.
Consent (Temporal Branch)/Introductory Paragraph: The rationale for Mohs was explained to the patient and consent was obtained. The risks, benefits and alternatives to therapy were discussed in detail. Specifically, the risks of damage to the temporal branch of the facial nerve, infection, scarring, bleeding, prolonged wound healing, incomplete removal, allergy to anesthesia, and recurrence were addressed. Prior to the procedure, the treatment site was clearly identified and confirmed by the patient. All components of Universal Protocol/PAUSE Rule completed.
O-T Plasty Text: The defect edges were debeveled with a #15 scalpel blade.  Given the location of the defect, shape of the defect and the proximity to free margins an O-T plasty was deemed most appropriate.  Using a sterile surgical marker, an appropriate O-T plasty was drawn incorporating the defect and placing the expected incisions within the relaxed skin tension lines where possible.    The area thus outlined was incised deep to adipose tissue with a #15 scalpel blade.  The skin margins were undermined to an appropriate distance in all directions utilizing iris scissors.
O-Z Plasty Text: The defect edges were debeveled with a #15 scalpel blade.  Given the location of the defect, shape of the defect and the proximity to free margins an O-Z plasty (double transposition flap) was deemed most appropriate.  Using a sterile surgical marker, the appropriate transposition flaps were drawn incorporating the defect and placing the expected incisions within the relaxed skin tension lines where possible.    The area thus outlined was incised deep to adipose tissue with a #15 scalpel blade.  The skin margins were undermined to an appropriate distance in all directions utilizing iris scissors.  Hemostasis was achieved with electrocautery.  The flaps were then transposed into place, one clockwise and the other counterclockwise, and anchored with interrupted buried subcutaneous sutures.
Perineural Invasion (For Histology - Be Specific If Possible): absent
Graft Donor Site Dermal Sutures (Optional): 5-0 Polysorb
Hatchet Flap Text: The defect edges were debeveled with a #15 scalpel blade.  Given the location of the defect, shape of the defect and the proximity to free margins a hatchet flap was deemed most appropriate.  Using a sterile surgical marker, an appropriate hatchet flap was drawn incorporating the defect and placing the expected incisions within the relaxed skin tension lines where possible.    The area thus outlined was incised deep to adipose tissue with a #15 scalpel blade.  The skin margins were undermined to an appropriate distance in all directions utilizing iris scissors.
Consent (Nose)/Introductory Paragraph: The rationale for Mohs was explained to the patient and consent was obtained. The risks, benefits and alternatives to therapy were discussed in detail. Specifically, the risks of nasal deformity, changes in the flow of air through the nose, infection, scarring, bleeding, prolonged wound healing, incomplete removal, allergy to anesthesia, nerve injury and recurrence were addressed. Prior to the procedure, the treatment site was clearly identified and confirmed by the patient. All components of Universal Protocol/PAUSE Rule completed.
Vermilion Border Text: The closure involved the vermilion border.
Graft Cartilage Fenestration Text: The cartilage was fenestrated with a 2mm punch biopsy to help facilitate graft survival and healing.
No Residual Tumor Seen Histology Text: There were no malignant cells seen in the sections examined.
Non-Graft Cartilage Fenestration Text: The cartilage was fenestrated with a 2mm punch biopsy to help facilitate healing.
Mohs Method Verbiage: An incision at a 45 degree angle following the standard Mohs approach was done and the specimen was harvested as a microscopic controlled layer.
Interpolation Flap Text: A decision was made to reconstruct the defect utilizing an interpolation axial flap and a staged reconstruction.  A telfa template was made of the defect.  This telfa template was then used to outline the interpolation flap.  The donor area for the pedicle flap was then injected with anesthesia.  The flap was excised through the skin and subcutaneous tissue down to the layer of the underlying musculature.  The interpolation flap was carefully excised within this deep plane to maintain its blood supply.  The edges of the donor site were undermined.   The donor site was closed in a primary fashion.  The pedicle was then rotated into position and sutured.  Once the tube was sutured into place, adequate blood supply was confirmed with blanching and refill.  The pedicle was then wrapped with xeroform gauze and dressed appropriately with a telfa and gauze bandage to ensure continued blood supply and protect the attached pedicle.
Unna Boot Text: An Unna boot was placed to help immobilize the limb and facilitate more rapid healing.
Complex Repair And Flap Additional Text (Will Appearing After The Standard Complex Repair Text): The complex repair was not sufficient to completely close the primary defect. The remaining additional defect was repaired with the flap mentioned below.
Undermining Location (Optional): in the superficial subcutaneous fat
Deep Sutures: 5-0 Maxon
Double O-Z Flap Text: The defect edges were debeveled with a #15 scalpel blade.  Given the location of the defect, shape of the defect and the proximity to free margins a Double O-Z flap was deemed most appropriate.  Using a sterile surgical marker, an appropriate transposition flap was drawn incorporating the defect and placing the expected incisions within the relaxed skin tension lines where possible. The area thus outlined was incised deep to adipose tissue with a #15 scalpel blade.  The skin margins were undermined to an appropriate distance in all directions utilizing iris scissors.
Brow Lift Text: A midfrontal incision was made medially to the defect to allow access to the tissues just superior to the left eyebrow. Following careful dissection inferiorly in a supraperiosteal plane to the level of the left eyebrow, several 3-0 monocryl sutures were used to resuspend the eyebrow orbicularis oculi muscular unit to the superior frontal bone periosteum. This resulted in an appropriate reapproximation of static eyebrow symmetry and correction of the left brow ptosis.
Epidermal Closure Graft Donor Site (Optional): running
Manual Repair Warning Statement: We plan on removing the manually selected variable below in favor of our much easier automatic structured text blocks found in the previous tab. We decided to do this to help make the flow better and give you the full power of structured data. Manual selection is never going to be ideal in our platform and I would encourage you to avoid using manual selection from this point on, especially since I will be sunsetting this feature. It is important that you do one of two things with the customized text below. First, you can save all of the text in a word file so you can have it for future reference. Second, transfer the text to the appropriate area in the Library tab. Lastly, if there is a flap or graft type which we do not have you need to let us know right away so I can add it in before the variable is hidden. No need to panic, we plan to give you roughly 6 months to make the change.
Dorsal Nasal Flap Text: The defect edges were debeveled with a #15 scalpel blade.  Given the location of the defect and the proximity to free margins a dorsal nasal flap was deemed most appropriate.  Using a sterile surgical marker, an appropriate dorsal nasal flap was drawn around the defect.    The area thus outlined was incised deep to adipose tissue with a #15 scalpel blade.  The skin margins were undermined to an appropriate distance in all directions utilizing iris scissors.
Tarsorrhaphy Text: A tarsorrhaphy was performed using Frost sutures.
Skin Substitute Text: The defect edges were debeveled with a #15 scalpel blade.  Given the location of the defect, shape of the defect and the proximity to free margins a skin substitute graft was deemed most appropriate.  The graft material was trimmed to fit the size of the defect. The graft was then placed in the primary defect and oriented appropriately.
Hemigard Postcare Instructions: The HEMIGARD strips are to remain completely dry for at least 5-7 days.
Previous Accession (Optional): outside path
Bi-Rhombic Flap Text: The defect edges were debeveled with a #15 scalpel blade.  Given the location of the defect and the proximity to free margins a bi-rhombic flap was deemed most appropriate.  Using a sterile surgical marker, an appropriate rhombic flap was drawn incorporating the defect. The area thus outlined was incised deep to adipose tissue with a #15 scalpel blade.  The skin margins were undermined to an appropriate distance in all directions utilizing iris scissors.
Bilobed Flap Text: The defect edges were debeveled with a #15 scalpel blade.  Given the location of the defect and the proximity to free margins a bilobe flap was deemed most appropriate.  Using a sterile surgical marker, an appropriate bilobe flap drawn around the defect.    The area thus outlined was incised deep to adipose tissue with a #15 scalpel blade.  The skin margins were undermined to an appropriate distance in all directions utilizing iris scissors.
Same Histology In Subsequent Stages Text: The pattern and morphology of the tumor is as described in the first stage.
Suture Removal: 14 days
Double Island Pedicle Flap Text: The defect edges were debeveled with a #15 scalpel blade.  Given the location of the defect, shape of the defect and the proximity to free margins a double island pedicle advancement flap was deemed most appropriate.  Using a sterile surgical marker, an appropriate advancement flap was drawn incorporating the defect, outlining the appropriate donor tissue and placing the expected incisions within the relaxed skin tension lines where possible.    The area thus outlined was incised deep to adipose tissue with a #15 scalpel blade.  The skin margins were undermined to an appropriate distance in all directions around the primary defect and laterally outward around the island pedicle utilizing iris scissors.  There was minimal undermining beneath the pedicle flap.
Hemostasis: Electrocautery
Transposition Flap Text: The defect edges were debeveled with a #15 scalpel blade.  Given the location of the defect and the proximity to free margins a transposition flap was deemed most appropriate.  Using a sterile surgical marker, an appropriate transposition flap was drawn incorporating the defect.    The area thus outlined was incised deep to adipose tissue with a #15 scalpel blade.  The skin margins were undermined to an appropriate distance in all directions utilizing iris scissors.
Area M Indication Text: Tumors in this location are included in Area M (cheek, forehead, scalp, neck, jawline and pretibial skin).  Mohs surgery is indicated for tumors in these anatomic locations.
Wound Check: 6 weeks
Donor Site Anesthesia Type: same as repair anesthesia
Composite Graft Text: The defect edges were debeveled with a #15 scalpel blade.  Given the location of the defect, shape of the defect, the proximity to free margins and the fact the defect was full thickness a composite graft was deemed most appropriate.  The defect was outline and then transferred to the donor site.  A full thickness graft was then excised from the donor site. The graft was then placed in the primary defect, oriented appropriately and then sutured into place.  The secondary defect was then repaired using a primary closure.
Double Z Plasty Text: The lesion was extirpated to the level of the fat with a #15 scalpel blade. Given the location of the defect, shape of the defect and the proximity to free margins a double Z-plasty was deemed most appropriate for repair. Using a sterile surgical marker, the appropriate transposition arms of the double Z-plasty were drawn incorporating the defect and placing the expected incisions within the relaxed skin tension lines where possible. The area thus outlined was incised deep to adipose tissue with a #15 scalpel blade. The skin margins were undermined to an appropriate distance in all directions utilizing iris scissors. The opposing transposition arms were then transposed and carried over into place in opposite direction and anchored with interrupted buried subcutaneous sutures.
Burow's Graft Text: The defect edges were debeveled with a #15 scalpel blade.  Given the location of the defect, shape of the defect, the proximity to free margins and the presence of a standing cone deformity a Burow's skin graft was deemed most appropriate. The standing cone was removed and this tissue was then trimmed to the shape of the primary defect. The adipose tissue was also removed until only dermis and epidermis were left.  The skin margins of the secondary defect were undermined to an appropriate distance in all directions utilizing iris scissors.  The secondary defect was closed with interrupted buried subcutaneous sutures.  The skin edges were then re-apposed with running  sutures.  The skin graft was then placed in the primary defect and oriented appropriately.
Mohs Rapid Report Verbiage: The area of clinically evident tumor was marked with skin marking ink and appropriately hatched.  The initial incision was made following the Mohs approach through the skin.  The specimen was taken to the lab, divided into the necessary number of pieces, chromacoded and processed according to the Mohs protocol.  This was repeated in successive stages until a tumor free defect was achieved.
Referring Physician (Optional): Chirag Matos DO
Consent Type: Consent 1 (Standard)
Cheiloplasty (Less Than 50%) Text: A decision was made to reconstruct the defect with a  cheiloplasty.  The defect was undermined extensively.  Additional obicularis oris muscle was excised with a 15 blade scalpel.  The defect was converted into a full thickness wedge, of less than 50% of the vertical height of the lip, to facilite a better cosmetic result.  Small vessels were then tied off with 5-0 monocyrl. The obicularis oris, superficial fascia, adipose and dermis were then reapproximated.  After the deeper layers were approximated the epidermis was reapproximated with particular care given to realign the vermilion border.
Initial Size Of Lesion: 0.8
Additional Anesthesia Volume In Cc: 6
Information: Selecting Yes will display possible errors in your note based on the variables you have selected. This validation is only offered as a suggestion for you. PLEASE NOTE THAT THE VALIDATION TEXT WILL BE REMOVED WHEN YOU FINALIZE YOUR NOTE. IF YOU WANT TO FAX A PRELIMINARY NOTE YOU WILL NEED TO TOGGLE THIS TO 'NO' IF YOU DO NOT WANT IT IN YOUR FAXED NOTE.
Cheiloplasty (Complex) Text: A decision was made to reconstruct the defect with a  cheiloplasty.  The defect was undermined extensively.  Additional obicularis oris muscle was excised with a 15 blade scalpel.  The defect was converted into a full thickness wedge to facilite a better cosmetic result.  Small vessels were then tied off with 5-0 monocyrl. The obicularis oris, superficial fascia, adipose and dermis were then reapproximated.  After the deeper layers were approximated the epidermis was reapproximated with particular care given to realign the vermilion border.
Depth Of Tumor Invasion (For Histology): dermis
Partial Purse String (Intermediate) Text: Given the location of the defect and the characteristics of the surrounding skin an intermediate purse string closure was deemed most appropriate.  Undermining was performed circumfirentially around the surgical defect.  A purse string suture was then placed and tightened. Wound tension only allowed a partial closure of the circular defect.
Undermining Type: Entire Wound
Peng Advancement Flap Text: The defect edges were debeveled with a #15 scalpel blade.  Given the location of the defect, shape of the defect and the proximity to free margins a Peng advancement flap was deemed most appropriate.  Using a sterile surgical marker, an appropriate advancement flap was drawn incorporating the defect and placing the expected incisions within the relaxed skin tension lines where possible. The area thus outlined was incised deep to adipose tissue with a #15 scalpel blade.  The skin margins were undermined to an appropriate distance in all directions utilizing iris scissors.
Advancement Flap (Double) Text: The defect edges were debeveled with a #15 scalpel blade.  Given the location of the defect and the proximity to free margins a double advancement flap was deemed most appropriate.  Using a sterile surgical marker, the appropriate advancement flaps were drawn incorporating the defect and placing the expected incisions within the relaxed skin tension lines where possible.    The area thus outlined was incised deep to adipose tissue with a #15 scalpel blade.  The skin margins were undermined to an appropriate distance in all directions utilizing iris scissors.
V-Y Flap Text: The defect edges were debeveled with a #15 scalpel blade.  Given the location of the defect, shape of the defect and the proximity to free margins a V-Y flap was deemed most appropriate.  Using a sterile surgical marker, an appropriate advancement flap was drawn incorporating the defect and placing the expected incisions within the relaxed skin tension lines where possible.    The area thus outlined was incised deep to adipose tissue with a #15 scalpel blade.  The skin margins were undermined to an appropriate distance in all directions utilizing iris scissors.
Advancement-Rotation Flap Text: The defect edges were debeveled with a #15 scalpel blade.  Given the location of the defect, shape of the defect and the proximity to free margins an advancement-rotation flap was deemed most appropriate.  Using a sterile surgical marker, an appropriate flap was drawn incorporating the defect and placing the expected incisions within the relaxed skin tension lines where possible. The area thus outlined was incised deep to adipose tissue with a #15 scalpel blade.  The skin margins were undermined to an appropriate distance in all directions utilizing iris scissors.
Location Indication Override (Is Already Calculated Based On Selected Body Location): Area M
Home Suture Removal Text: Patient was provided instructions on removing sutures and will remove their sutures at home.  If they have any questions or difficulties they will call the office.
Intermediate Repair And Flap Additional Text (Will Appearing After The Standard Complex Repair Text): The intermediate repair was not sufficient to completely close the primary defect. The remaining additional defect was repaired with the flap mentioned below.
Eye Protection Verbiage: Before proceeding with the stage, a plastic scleral shield was inserted. The globe was anesthetized with a few drops of 1% lidocaine with 1:100,000 epinephrine. Then, an appropriate sized scleral shield was chosen and coated with lacrilube ointment. The shield was gently inserted and left in place for the duration of each stage. After the stage was completed, the shield was gently removed.
Body Location Override (Optional - Billing Will Still Be Based On Selected Body Map Location If Applicable): right ear mid-helix
Retention Suture Text: Retention sutures were placed to support the closure and prevent dehiscence.
Consent (Near Eyelid Margin)/Introductory Paragraph: The rationale for Mohs was explained to the patient and consent was obtained. The risks, benefits and alternatives to therapy were discussed in detail. Specifically, the risks of ectropion or eyelid deformity, infection, scarring, bleeding, prolonged wound healing, incomplete removal, allergy to anesthesia, nerve injury and recurrence were addressed. Prior to the procedure, the treatment site was clearly identified and confirmed by the patient. All components of Universal Protocol/PAUSE Rule completed.
Ear Star Wedge Flap Text: The defect edges were debeveled with a #15 blade scalpel.  Given the location of the defect and the proximity to free margins (helical rim) an ear star wedge flap was deemed most appropriate.  Using a sterile surgical marker, the appropriate flap was drawn incorporating the defect and placing the expected incisions between the helical rim and antihelix where possible.  The area thus outlined was incised through and through with a #15 scalpel blade.
Double O-Z Plasty Text: The defect edges were debeveled with a #15 scalpel blade.  Given the location of the defect, shape of the defect and the proximity to free margins a Double O-Z plasty (double transposition flap) was deemed most appropriate.  Using a sterile surgical marker, the appropriate transposition flaps were drawn incorporating the defect and placing the expected incisions within the relaxed skin tension lines where possible. The area thus outlined was incised deep to adipose tissue with a #15 scalpel blade.  The skin margins were undermined to an appropriate distance in all directions utilizing iris scissors.  Hemostasis was achieved with electrocautery.  The flaps were then transposed into place, one clockwise and the other counterclockwise, and anchored with interrupted buried subcutaneous sutures.
Keystone Flap Text: The defect edges were debeveled with a #15 scalpel blade.  Given the location of the defect, shape of the defect a keystone flap was deemed most appropriate.  Using a sterile surgical marker, an appropriate keystone flap was drawn incorporating the defect, outlining the appropriate donor tissue and placing the expected incisions within the relaxed skin tension lines where possible. The area thus outlined was incised deep to adipose tissue with a #15 scalpel blade.  The skin margins were undermined to an appropriate distance in all directions around the primary defect and laterally outward around the flap utilizing iris scissors.
X Size Of Lesion In Cm (Optional): 0.6
Cartilage Graft Text: The defect edges were debeveled with a #15 scalpel blade.  Given the location of the defect, shape of the defect, the fact the defect involved a full thickness cartilage defect a cartilage graft was deemed most appropriate.  An appropriate donor site was identified, cleansed, and anesthetized. The cartilage graft was then harvested and transferred to the recipient site, oriented appropriately and then sutured into place.  The secondary defect was then repaired using a primary closure.
Debridement Text: The wound edges were debrided prior to proceeding with the closure to facilitate wound healing.
Mastoid Interpolation Flap Text: A decision was made to reconstruct the defect utilizing an interpolation axial flap and a staged reconstruction.  A telfa template was made of the defect.  This telfa template was then used to outline the mastoid interpolation flap.  The donor area for the pedicle flap was then injected with anesthesia.  The flap was excised through the skin and subcutaneous tissue down to the layer of the underlying musculature.  The pedicle flap was carefully excised within this deep plane to maintain its blood supply.  The edges of the donor site were undermined.   The donor site was closed in a primary fashion.  The pedicle was then rotated into position and sutured.  Once the tube was sutured into place, adequate blood supply was confirmed with blanching and refill.  The pedicle was then wrapped with xeroform gauze and dressed appropriately with a telfa and gauze bandage to ensure continued blood supply and protect the attached pedicle.

## 2023-09-12 ENCOUNTER — APPOINTMENT (RX ONLY)
Dept: URBAN - METROPOLITAN AREA CLINIC 36 | Facility: CLINIC | Age: 73
Setting detail: DERMATOLOGY
End: 2023-09-12

## 2023-09-12 DIAGNOSIS — Z48.02 ENCOUNTER FOR REMOVAL OF SUTURES: ICD-10-CM

## 2023-09-12 PROCEDURE — ? SUTURE REMOVAL (GLOBAL PERIOD)

## 2023-09-12 ASSESSMENT — LOCATION ZONE DERM: LOCATION ZONE: EAR

## 2023-09-12 ASSESSMENT — LOCATION DETAILED DESCRIPTION DERM: LOCATION DETAILED: RIGHT SUPERIOR CRUS OF ANTIHELIX

## 2023-09-12 ASSESSMENT — LOCATION SIMPLE DESCRIPTION DERM: LOCATION SIMPLE: RIGHT EAR

## 2023-09-12 NOTE — PROCEDURE: SUTURE REMOVAL (GLOBAL PERIOD)
Body Location Override (Optional - Billing Will Still Be Based On Selected Body Map Location If Applicable): right ear
Detail Level: Detailed
Add 06833 Cpt? (Important Note: In 2017 The Use Of 03528 Is Being Tracked By Cms To Determine Future Global Period Reimbursement For Global Periods): no

## 2023-09-26 ENCOUNTER — APPOINTMENT (RX ONLY)
Dept: URBAN - METROPOLITAN AREA CLINIC 36 | Facility: CLINIC | Age: 73
Setting detail: DERMATOLOGY
End: 2023-09-26

## 2023-09-26 DIAGNOSIS — Z48.817 ENCOUNTER FOR SURGICAL AFTERCARE FOLLOWING SURGERY ON THE SKIN AND SUBCUTANEOUS TISSUE: ICD-10-CM

## 2023-09-26 PROCEDURE — ? POST-OP WOUND CHECK

## 2023-09-26 ASSESSMENT — LOCATION ZONE DERM: LOCATION ZONE: EAR

## 2023-09-26 ASSESSMENT — LOCATION DETAILED DESCRIPTION DERM: LOCATION DETAILED: RIGHT CYMBA CONCHA

## 2023-09-26 ASSESSMENT — LOCATION SIMPLE DESCRIPTION DERM: LOCATION SIMPLE: RIGHT EAR

## 2023-09-26 NOTE — PROCEDURE: POST-OP WOUND CHECK
Body Location Override (Optional - Billing Will Still Be Based On Selected Body Map Location If Applicable): right ear
Detail Level: Detailed
Add 26484 Cpt? (Important Note: In 2017 The Use Of 18741 Is Being Tracked By Cms To Determine Future Global Period Reimbursement For Global Periods): no

## (undated) DEVICE — PACK MAJOR ORTHO - (2EA/CA)

## (undated) DEVICE — PENCIL ELECTSURG 10FT BTN SWH - (50/CA)

## (undated) DEVICE — GOWN WARMING STANDARD FLEX - (30/CA)

## (undated) DEVICE — K-WIRE 3MMX285MM STERILE

## (undated) DEVICE — SUTURE 0 VICRYL PLUS CT-1 - 36 INCH (36/BX)

## (undated) DEVICE — GLOVE BIOGEL PI ORTHO SZ 7.5 PF LF (40PR/BX)

## (undated) DEVICE — SET LEADWIRE 5 LEAD BEDSIDE DISPOSABLE ECG (1SET OF 5/EA)

## (undated) DEVICE — DRESSING LEUKOMED STERILE 11.75X4IN - (50/CA)

## (undated) DEVICE — SUCTION INSTRUMENT YANKAUER BULBOUS TIP W/O VENT (50EA/CA)

## (undated) DEVICE — DRAPE 36X28IN RAD CARM BND BG - (25/CA) O

## (undated) DEVICE — COVER LIGHT HANDLE ALC PLUS DISP (18EA/BX)

## (undated) DEVICE — SET EXTENSION WITH 2 PORTS (48EA/CA) ***PART #2C8610 IS A SUBSTITUTE*****

## (undated) DEVICE — SODIUM CHL IRRIGATION 0.9% 1000ML (12EA/CA)

## (undated) DEVICE — DRAPE SURGICAL U 77X120 - (10/CA)

## (undated) DEVICE — TUBING CLEARLINK DUO-VENT - C-FLO (48EA/CA)

## (undated) DEVICE — BOVIE BLADE COATED - (50/PK)

## (undated) DEVICE — SUTURE 2-0 VICRYL PLUS CT-1 36 (36PK/BX)"

## (undated) DEVICE — DRAPE U ORTHOPEDIC - (10/BX)

## (undated) DEVICE — BAG SPONGE COUNT 10.25 X 32 - BLUE (250/CA)

## (undated) DEVICE — STAPLER SKIN DISP - (6/BX 10BX/CA) VISISTAT

## (undated) DEVICE — GLOVE BIOGEL SZ 7 SURGICAL PF LTX - (50PR/BX 4BX/CA)

## (undated) DEVICE — CANISTER SUCTION 3000ML MECHANICAL FILTER AUTO SHUTOFF MEDI-VAC NONSTERILE LF DISP  (40EA/CA)

## (undated) DEVICE — GUIDE WIRE BALL TIP 3X1000 STERILE

## (undated) DEVICE — GLOVE BIOGEL PI INDICATOR SZ 7.5 SURGICAL PF LF -(50/BX 4BX/CA)

## (undated) DEVICE — REAMER SHAFT  MODIFIED TRINKLE  8X510MM

## (undated) DEVICE — DRAPE STRLE REG TOWEL 18X24 - (10/BX 4BX/CA)"

## (undated) DEVICE — LACTATED RINGERS INJ 1000 ML - (14EA/CA 60CA/PF)

## (undated) DEVICE — SLEEVE, VASO, THIGH, MED

## (undated) DEVICE — ELECTRODE DUAL RETURN W/ CORD - (50/PK)

## (undated) DEVICE — DRAPE SURG STERI-DRAPE 7X11OD - (40EA/CA)

## (undated) DEVICE — SUTURE GENERAL

## (undated) DEVICE — DRAPE C ARMOR (12EA/CA)

## (undated) DEVICE — SENSOR OXIMETER ADULT SPO2 RD SET (20EA/BX)

## (undated) DEVICE — LOCKING DRILL 4.2X360MM

## (undated) DEVICE — SUCTION INSTRUMENT YANKAUER OPEN TIP W/O VENT (50EA/CA)

## (undated) DEVICE — PIN

## (undated) DEVICE — DRESSING TRANSPARENT FILM TEGADERM 4 X 4.75" (50EA/BX)"

## (undated) DEVICE — GLOVE BIOGEL INDICATOR SZ 7.5 SURGICAL PF LTX - (50PR/BX 4BX/CA)

## (undated) DEVICE — WATER IRRIGATION STERILE 1000ML (12EA/CA)

## (undated) DEVICE — DRAPE LARGE 3 QUARTER - (20/CA)

## (undated) DEVICE — TOWEL STOP TIMEOUT SAFETY FLAG (40EA/CA)